# Patient Record
Sex: MALE | Race: WHITE | Employment: FULL TIME | URBAN - METROPOLITAN AREA
[De-identification: names, ages, dates, MRNs, and addresses within clinical notes are randomized per-mention and may not be internally consistent; named-entity substitution may affect disease eponyms.]

---

## 2017-04-25 NOTE — PROGRESS NOTES
"  SUBJECTIVE:                                                    Zackery Rogers is a 56 year old male who presents to clinic today for the following health issues:    HPI    Acute Illness   Acute illness concerns: Flu follow up  Onset: 4 weeks    Fever: no    Chills/Sweats: YES    Headache (location?): no    Sinus Pressure:YES- teeth pain    Conjunctivitis:  no    Ear Pain: no    Rhinorrhea: no    Congestion: YES    Sore Throat: no     Cough: YES-productive of clear sputum, with shortness of breath    Wheeze: no    Decreased Appetite: YES    Nausea: no    Vomiting: no    Diarrhea:  no    Dysuria/Freq.: no    Fatigue/Achiness: YES    Sick/Strep Exposure: YES- Pt had the flu     Therapies Tried and outcome: None    Joint pain  Phlegm in upper lungs  Cough  Sinus problems chronic  Muscle aches,   Working and so tired and aches in joints and muscles for 2 months  Not taking a statin  Night sweats  Swelling in legs  HDL always  [place]    Problem list and histories reviewed & adjusted, as indicated.  Additional history: as documented    Labs reviewed in EPIC    ROS:  Constitutional, HEENT, cardiovascular, pulmonary, gi and gu systems are negative, except as otherwise noted.    OBJECTIVE:                                                    /88 (BP Location: Left arm, Patient Position: Chair, Cuff Size: Adult Large)  Pulse 96  Temp 98.1  F (36.7  C) (Temporal)  Resp 16  Ht 5' 10.32\" (1.786 m)  Wt 229 lb (103.9 kg)  SpO2 97%  BMI 32.56 kg/m2  Body mass index is 32.56 kg/(m^2).  GENERAL: healthy, alert and no distress  EYES: Eyes grossly normal to inspection, PERRL and conjunctivae and sclerae normal  HENT: ear canals and TM's normal, nose and mouth without ulcers or lesions  NECK: no adenopathy, no asymmetry, masses, or scars and thyroid normal to palpation  RESP: lungs clear to auscultation - no rales, rhonchi or wheezes  CV: regular rate and rhythm, normal S1 S2, no S3 or S4, no murmur, click or rub, " no peripheral edema and peripheral pulses strong  MS: no gross musculoskeletal defects noted, no edema  SKIN: no suspicious lesions or rashes  NEURO: Normal strength and tone, mentation intact and speech normal  PSYCH: mentation appears normal, affect normal/bright    Diagnostic Test Results:  Results for orders placed or performed in visit on 04/26/17   Lipid panel reflex to direct LDL   Result Value Ref Range    Cholesterol 134 <200 mg/dL    Triglycerides 83 <150 mg/dL    HDL Cholesterol 29 (L) >39 mg/dL    LDL Cholesterol Calculated 88 <100 mg/dL    Non HDL Cholesterol 105 <130 mg/dL   Lyme Disease Cristina with reflex to WB Serum   Result Value Ref Range    Lyme Disease Antibodies Serum 0.06 0.00 - 0.89   CBC with platelets and differential   Result Value Ref Range    WBC 7.8 4.0 - 11.0 10e9/L    RBC Count 4.82 4.4 - 5.9 10e12/L    Hemoglobin 14.3 13.3 - 17.7 g/dL    Hematocrit 43.2 40.0 - 53.0 %    MCV 90 78 - 100 fl    MCH 29.7 26.5 - 33.0 pg    MCHC 33.1 31.5 - 36.5 g/dL    RDW 14.0 10.0 - 15.0 %    Platelet Count 159 150 - 450 10e9/L    Diff Method Automated Method     % Neutrophils 68.2 %    % Lymphocytes 16.3 %    % Monocytes 11.1 %    % Eosinophils 4.0 %    % Basophils 0.4 %    Absolute Neutrophil 5.3 1.6 - 8.3 10e9/L    Absolute Lymphocytes 1.3 0.8 - 5.3 10e9/L    Absolute Monocytes 0.9 0.0 - 1.3 10e9/L    Absolute Eosinophils 0.3 0.0 - 0.7 10e9/L    Absolute Basophils 0.0 0.0 - 0.2 10e9/L   Comprehensive metabolic panel (BMP + Alb, Alk Phos, ALT, AST, Total. Bili, TP)   Result Value Ref Range    Sodium 144 133 - 144 mmol/L    Potassium 4.1 3.4 - 5.3 mmol/L    Chloride 110 (H) 94 - 109 mmol/L    Carbon Dioxide 26 20 - 32 mmol/L    Anion Gap 8 3 - 14 mmol/L    Glucose 117 (H) 70 - 99 mg/dL    Urea Nitrogen 12 7 - 30 mg/dL    Creatinine 0.71 0.66 - 1.25 mg/dL    GFR Estimate >90  Non  GFR Calc   >60 mL/min/1.7m2    GFR Estimate If Black >90   GFR Calc   >60 mL/min/1.7m2     Calcium 8.9 8.5 - 10.1 mg/dL    Bilirubin Total 0.6 0.2 - 1.3 mg/dL    Albumin 3.2 (L) 3.4 - 5.0 g/dL    Protein Total 6.4 (L) 6.8 - 8.8 g/dL    Alkaline Phosphatase 114 40 - 150 U/L    ALT 22 0 - 70 U/L    AST 13 0 - 45 U/L   TSH with free T4 reflex   Result Value Ref Range    TSH 1.58 0.40 - 4.00 mU/L   CRP, inflammation   Result Value Ref Range    CRP Inflammation 68.2 (H) 0.0 - 8.0 mg/L        ASSESSMENT/PLAN:                                                      1. Acute bronchitis with symptoms > 10 days    - azithromycin (ZITHROMAX) 250 MG tablet; Two tablets first day, then one tablet daily for four days.  Dispense: 6 tablet; Refill: 0    3. Polymyalgia (H)/4. Polyarthralgia    - Lyme Disease Cristina with reflex to WB Serum  - CBC with platelets and differential  - Comprehensive metabolic panel (BMP + Alb, Alk Phos, ALT, AST, Total. Bili, TP)  - TSH with free T4 reflex  - CRP, inflammation    5. Fatigue, unspecified type    - Lyme Disease Cristina with reflex to WB Serum  - CBC with platelets and differential  - Comprehensive metabolic panel (BMP + Alb, Alk Phos, ALT, AST, Total. Bili, TP)  - TSH with free T4 reflex  - CRP, inflammation    6. Chest tightness  - XR Chest 2 Views; Future    7. Cough  - XR Chest 2 Views; Future    8. Encounter for screening for cardiovascular disorders  - Lipid panel reflex to direct LDL    1. Acute bronchitis with symptoms > 10 days  Cough has lasted over 4 weeks.  Treat for bacterial bronchitis with azithromycin.  Follow up in 10-14 days if symptoms persist or worsen.  - azithromycin (ZITHROMAX) 250 MG tablet; Two tablets first day, then one tablet daily for four days.  Dispense: 6 tablet; Refill: 0    2. DISH (diffuse idiopathic skeletal hyperostosis)  - ORTHO  REFERRAL    3. Polymyalgia (H)/4. Pain in joint, multiple sitesPatient having pain in multiple joints and muscles.    - Lyme Disease Cristina with reflex to WB Serum  - CBC with platelets and differential  - Comprehensive  metabolic panel (BMP + Alb, Alk Phos, ALT, AST, Total. Bili, TP)  - TSH with free T4 reflex  - CRP, inflammation  - RHEUMATOLOGY REFERRAL  - ORTHO  REFERRAL    5. Fatigue, unspecified type  Patient is more fatigued, is coming home after work and will fall asleep easily which is a significant change for him.  [place] out in the woods, r/o [place]ym[place].  Will also get blood work to check hemoglobin, kidney/liver function, thyroid and an inflammatory marker.  - Lyme Disease Cristina with reflex to WB Serum  - CBC with platelets and differential  - Comprehensive metabolic panel (BMP + Alb, Alk Phos, ALT, AST, Total. Bili, TP)  - TSH with free T4 reflex  - CRP, inflammation  - RHEUMATOLOGY REFERRAL    6. Chest tightness  - XR Chest 2 Views; Future    7. Cough  - XR Chest 2 Views; Future    8. Encounter for screening for cardiovascular disorders  - Lipid panel reflex to direct LDL    9. Elevated C-reactive protein (CRP)  - RHEUMATOLOGY REFERRAL    DINH [facility]

## 2017-04-26 ENCOUNTER — OFFICE VISIT (OUTPATIENT)
Dept: FAMILY MEDICINE | Facility: OTHER | Age: 57
End: 2017-04-26
Payer: COMMERCIAL

## 2017-04-26 ENCOUNTER — RADIANT APPOINTMENT (OUTPATIENT)
Dept: GENERAL RADIOLOGY | Facility: OTHER | Age: 57
End: 2017-04-26
Attending: STUDENT IN AN ORGANIZED HEALTH CARE EDUCATION/TRAINING PROGRAM
Payer: COMMERCIAL

## 2017-04-26 VITALS
HEIGHT: 70 IN | HEART RATE: 96 BPM | SYSTOLIC BLOOD PRESSURE: 120 MMHG | WEIGHT: 229 LBS | OXYGEN SATURATION: 97 % | RESPIRATION RATE: 16 BRPM | BODY MASS INDEX: 32.78 KG/M2 | TEMPERATURE: 98.1 F | DIASTOLIC BLOOD PRESSURE: 88 MMHG

## 2017-04-26 DIAGNOSIS — R79.82 ELEVATED C-REACTIVE PROTEIN (CRP): ICD-10-CM

## 2017-04-26 DIAGNOSIS — M35.3 POLYMYALGIA (H): ICD-10-CM

## 2017-04-26 DIAGNOSIS — R05.9 COUGH: ICD-10-CM

## 2017-04-26 DIAGNOSIS — R07.89 CHEST TIGHTNESS: ICD-10-CM

## 2017-04-26 DIAGNOSIS — J20.9 ACUTE BRONCHITIS WITH SYMPTOMS > 10 DAYS: Primary | ICD-10-CM

## 2017-04-26 DIAGNOSIS — M48.10 DISH (DIFFUSE IDIOPATHIC SKELETAL HYPEROSTOSIS): ICD-10-CM

## 2017-04-26 DIAGNOSIS — R53.83 FATIGUE, UNSPECIFIED TYPE: ICD-10-CM

## 2017-04-26 DIAGNOSIS — Z13.6 ENCOUNTER FOR SCREENING FOR CARDIOVASCULAR DISORDERS: ICD-10-CM

## 2017-04-26 DIAGNOSIS — M25.50 PAIN IN JOINT, MULTIPLE SITES: ICD-10-CM

## 2017-04-26 LAB
ALBUMIN SERPL-MCNC: 3.2 G/DL (ref 3.4–5)
ALP SERPL-CCNC: 114 U/L (ref 40–150)
ALT SERPL W P-5'-P-CCNC: 22 U/L (ref 0–70)
ANION GAP SERPL CALCULATED.3IONS-SCNC: 8 MMOL/L (ref 3–14)
AST SERPL W P-5'-P-CCNC: 13 U/L (ref 0–45)
BASOPHILS # BLD AUTO: 0 10E9/L (ref 0–0.2)
BASOPHILS NFR BLD AUTO: 0.4 %
BILIRUB SERPL-MCNC: 0.6 MG/DL (ref 0.2–1.3)
BUN SERPL-MCNC: 12 MG/DL (ref 7–30)
CALCIUM SERPL-MCNC: 8.9 MG/DL (ref 8.5–10.1)
CHLORIDE SERPL-SCNC: 110 MMOL/L (ref 94–109)
CHOLEST SERPL-MCNC: 134 MG/DL
CO2 SERPL-SCNC: 26 MMOL/L (ref 20–32)
CREAT SERPL-MCNC: 0.71 MG/DL (ref 0.66–1.25)
CRP SERPL-MCNC: 68.2 MG/L (ref 0–8)
DIFFERENTIAL METHOD BLD: NORMAL
EOSINOPHIL # BLD AUTO: 0.3 10E9/L (ref 0–0.7)
EOSINOPHIL NFR BLD AUTO: 4 %
ERYTHROCYTE [DISTWIDTH] IN BLOOD BY AUTOMATED COUNT: 14 % (ref 10–15)
GFR SERPL CREATININE-BSD FRML MDRD: ABNORMAL ML/MIN/1.7M2
GLUCOSE SERPL-MCNC: 117 MG/DL (ref 70–99)
HCT VFR BLD AUTO: 43.2 % (ref 40–53)
HDLC SERPL-MCNC: 29 MG/DL
HGB BLD-MCNC: 14.3 G/DL (ref 13.3–17.7)
LDLC SERPL CALC-MCNC: 88 MG/DL
LYMPHOCYTES # BLD AUTO: 1.3 10E9/L (ref 0.8–5.3)
LYMPHOCYTES NFR BLD AUTO: 16.3 %
MCH RBC QN AUTO: 29.7 PG (ref 26.5–33)
MCHC RBC AUTO-ENTMCNC: 33.1 G/DL (ref 31.5–36.5)
MCV RBC AUTO: 90 FL (ref 78–100)
MONOCYTES # BLD AUTO: 0.9 10E9/L (ref 0–1.3)
MONOCYTES NFR BLD AUTO: 11.1 %
NEUTROPHILS # BLD AUTO: 5.3 10E9/L (ref 1.6–8.3)
NEUTROPHILS NFR BLD AUTO: 68.2 %
NONHDLC SERPL-MCNC: 105 MG/DL
PLATELET # BLD AUTO: 159 10E9/L (ref 150–450)
POTASSIUM SERPL-SCNC: 4.1 MMOL/L (ref 3.4–5.3)
PROT SERPL-MCNC: 6.4 G/DL (ref 6.8–8.8)
RBC # BLD AUTO: 4.82 10E12/L (ref 4.4–5.9)
SODIUM SERPL-SCNC: 144 MMOL/L (ref 133–144)
TRIGL SERPL-MCNC: 83 MG/DL
TSH SERPL DL<=0.005 MIU/L-ACNC: 1.58 MU/L (ref 0.4–4)
WBC # BLD AUTO: 7.8 10E9/L (ref 4–11)

## 2017-04-26 PROCEDURE — 84443 ASSAY THYROID STIM HORMONE: CPT | Performed by: STUDENT IN AN ORGANIZED HEALTH CARE EDUCATION/TRAINING PROGRAM

## 2017-04-26 PROCEDURE — 71020 XR CHEST 2 VW: CPT

## 2017-04-26 PROCEDURE — 85025 COMPLETE CBC W/AUTO DIFF WBC: CPT | Performed by: STUDENT IN AN ORGANIZED HEALTH CARE EDUCATION/TRAINING PROGRAM

## 2017-04-26 PROCEDURE — 86618 LYME DISEASE ANTIBODY: CPT | Performed by: STUDENT IN AN ORGANIZED HEALTH CARE EDUCATION/TRAINING PROGRAM

## 2017-04-26 PROCEDURE — 99214 OFFICE O/P EST MOD 30 MIN: CPT | Performed by: STUDENT IN AN ORGANIZED HEALTH CARE EDUCATION/TRAINING PROGRAM

## 2017-04-26 PROCEDURE — 86140 C-REACTIVE PROTEIN: CPT | Performed by: STUDENT IN AN ORGANIZED HEALTH CARE EDUCATION/TRAINING PROGRAM

## 2017-04-26 PROCEDURE — 80053 COMPREHEN METABOLIC PANEL: CPT | Performed by: STUDENT IN AN ORGANIZED HEALTH CARE EDUCATION/TRAINING PROGRAM

## 2017-04-26 PROCEDURE — 80061 LIPID PANEL: CPT | Performed by: STUDENT IN AN ORGANIZED HEALTH CARE EDUCATION/TRAINING PROGRAM

## 2017-04-26 PROCEDURE — 36415 COLL VENOUS BLD VENIPUNCTURE: CPT | Performed by: STUDENT IN AN ORGANIZED HEALTH CARE EDUCATION/TRAINING PROGRAM

## 2017-04-26 RX ORDER — LISINOPRIL 40 MG/1
40 TABLET ORAL
COMMUNITY
Start: 2017-04-03 | End: 2018-09-04 | Stop reason: ALTCHOICE

## 2017-04-26 RX ORDER — MINOCYCLINE HYDROCHLORIDE 100 MG/1
100 TABLET ORAL
Status: ON HOLD | COMMUNITY
Start: 2016-10-07 | End: 2017-06-05

## 2017-04-26 ASSESSMENT — PAIN SCALES - GENERAL: PAINLEVEL: NO PAIN (0)

## 2017-04-26 NOTE — MR AVS SNAPSHOT
"              After Visit Summary   4/26/2017    Zackery Rogers    MRN: [id number]           Patient Information     Date Of Birth          1960        Visit Information        Provider Department      4/26/2017 8:40 AM Kalina Poe APRN CNP [facility]        Today's Diagnoses     Fatigue, unspecified type    -  1    Screen for colon cancer        Need for hepatitis C screening test        Polymyalgia (H)        Encounter for screening for cardiovascular disorders        Chest tightness        Cough           Follow-ups after your visit        Who to contact     If you have questions or need follow up information about today's clinic visit or your schedule please contact [facility] directly at [phone].  Normal or non-critical lab and imaging results will be communicated to you by [employer], letter or phone within 4 business days after the clinic has received the results. If you do not hear from us within 7 days, please contact the clinic through MyChart or phone. If you have a critical or abnormal lab result, we will notify you by phone as soon as possible.  Submit refill requests through [employer] or call your pharmacy and they will forward the refill request to us. Please allow 3 business days for your refill to be completed.          Additional Information About Your Visit        MyChart Information     [employer] lets you send messages to your doctor, view your test results, renew your prescriptions, schedule appointments and more. To sign up, go to [email or web address] . Click on \"Log in\" on the left side of the screen, which will take you to the Welcome page. Then click on \"Sign up Now\" on the right side of the page.     You will be asked to enter the access code listed below, as well as some personal information. Please follow the directions to create your username and password.     Your access code is: [email or web address]  Expires: 7/25/2017  9:44 AM     Your " "access code will  in 90 days. If you need help or a new code, please call your [place] clinic or [phone].        Care EveryWhere ID     This is your Care EveryWhere ID. This could be used by other organizations to access your [place] medical records  [id number]        Your Vitals Were     Pulse Temperature Respirations Height Pulse Oximetry BMI (Body Mass Index)    96 98.1  F (36.7  C) (Temporal) 16 5' 10.32\" (1.786 m) 97% 32.56 kg/m2       Blood Pressure from Last 3 Encounters:   17 120/88   16 134/78    Weight from Last 3 Encounters:   17 229 lb (103.9 kg)   16 236 lb (107 kg)              We Performed the Following     CBC with platelets and differential     Comprehensive metabolic panel (BMP + Alb, Alk Phos, ALT, AST, Total. Bili, TP)     CRP, inflammation     Lipid panel reflex to direct LDL     Lyme Disease Cristina with reflex to WB Serum     TSH with free T4 reflex        Primary Care Provider Office Phone # Fax #    Kalina Carmencita Poe, [facility] [phone]       [facility] [address]  [facility] [address]        Thank you!     Thank you for choosing [facility]  for your care. Our goal is always to provide you with excellent care. Hearing back from our patients is one way we can continue to improve our services. Please take a few minutes to complete the written survey that you may receive in the mail after your visit with us. Thank you!             Your Updated Medication List - Protect others around you: Learn how to safely use, store and throw away your medicines at www.disposemymeds.org.          This list is accurate as of: 17  9:44 AM.  Always use your most recent med list.                   Brand Name Dispense Instructions for use    lisinopril 40 MG tablet    PRINIVIL/ZESTRIL     Take 40 mg by mouth       minocycline 100 MG tablet    DYNACIN     Take 100 mg by mouth         "

## 2017-04-26 NOTE — NURSING NOTE
"Chief Complaint   Patient presents with     Flu     Follow up     Panel Management     MyChart, Colon, hep C, lipid       Initial /88 (BP Location: Left arm, Patient Position: Chair, Cuff Size: Adult Large)  Pulse 96  Temp 98.1  F (36.7  C) (Temporal)  Resp 16  Ht 5' 10.32\" (1.786 m)  Wt 229 lb (103.9 kg)  SpO2 97%  BMI 32.56 kg/m2 Estimated body mass index is 32.56 kg/(m^2) as calculated from the following:    Height as of this encounter: 5' 10.32\" (1.786 m).    Weight as of this encounter: 229 lb (103.9 kg).  Medication Reconciliation: complete   Nelida Hamilton CMA      "

## 2017-04-27 ENCOUNTER — TELEPHONE (OUTPATIENT)
Dept: FAMILY MEDICINE | Facility: OTHER | Age: 57
End: 2017-04-27

## 2017-04-27 LAB — B BURGDOR IGG+IGM SER QL: 0.06 (ref 0–0.89)

## 2017-04-29 RX ORDER — AZITHROMYCIN 250 MG/1
TABLET, FILM COATED ORAL
Qty: 6 TABLET | Refills: 0 | Status: SHIPPED | OUTPATIENT
Start: 2017-04-29 | End: 2017-05-10

## 2017-05-09 ENCOUNTER — TELEPHONE (OUTPATIENT)
Dept: FAMILY MEDICINE | Facility: OTHER | Age: 57
End: 2017-05-09

## 2017-05-09 NOTE — TELEPHONE ENCOUNTER
Reason for call:  Symptom  Reason for call:  Patient reporting a symptom    Symptom or request: achy body, cold, hot sweats, upset stomach, no traveling    Duration (how long have symptoms been present): 2 months    Have you been treated for this before? Yes    Additional comments: should he be seen again for this or what are his options    Phone Number patient can be reached at:  Home number on file [phone] (home)    Best Time:  any    Can we leave a detailed message on this number:  YES    Call taken on 5/9/2017 at 8:03 AM by Saranya Lowe

## 2017-05-09 NOTE — TELEPHONE ENCOUNTER
"Per LOV notes, patient should follow up in clinic if symptoms persist or worsen.  He states he continues to have symptoms, but they are \"different every day\".   Nothing is new and his sinuses are actually clear.   He continues to cough.   Recheck was scheduled for tomorrow morning - he declines an appointment today as he is already at work.     Kennedi Tavarez, RN, BSN    "

## 2017-05-09 NOTE — PROGRESS NOTES
"  SUBJECTIVE:                                                    Zackery Rogers is a 56 year old male who presents to clinic today for the following health issues:      HPI    Acute Illness   Acute illness concerns: Cough, body aches  Onset: 2 months    Fever: no    Chills/Sweats: YES, comes home from work and has to curl up in a blanket until he starts to sweat.  At night is consistently having night sweats.    Headache (location?): no    Sinus Pressure:no    Conjunctivitis:  no    Ear Pain: no    Rhinorrhea: no    Congestion: YES    Sore Throat: no     Cough: YES-productive of clear sputum, with shortness of breath    Wheeze: no    Decreased Appetite: no    Nausea: no    Vomiting: no    Diarrhea:  no    Dysuria/Freq.: no    Fatigue/Achiness: YES    Sick/Strep Exposure: no     Therapies Tried and outcome: azithromycin - does not seem to help, pt states another medication that is in the same family as minocycline, possibly doxycyline, does not remember.      Concern - Ankle swelling     Onset: \"Half way through since cough\"    Description:   Ankles swelling, has tingling feeling and feels pressure. Feels good in the morning, but builds up throughout the day    Intensity: None    Progression of Symptoms:  worsening    Accompanying Signs & Symptoms:  Tingling, pressure, tingling has kind of gone away, more of a pressure type feeling, worse as the day goes on  Neck aches       Previous history of similar problem:   None    Precipitating factors:   Worsened by: N/A    Alleviating factors:  Improved by: N/A       Therapies Tried and outcome: 25    Feeling like needs to wrap up after gets home from work and then will get really hot    No bowel or bladder changes  Shortness of breath - tiny bit with coughing spells but never at rest  No skin lesions  No swelling of lymph nodes - not that he has noticed  Loss of appetite at the beginning but getting better      Problem list and histories reviewed & adjusted, as " "indicated.  Additional history: as documented    Labs reviewed in EPIC    ROS:  Constitutional, HEENT, cardiovascular, pulmonary, gi and gu systems are negative, except as otherwise noted.    OBJECTIVE:                                                    /78 (BP Location: Left arm, Patient Position: Chair, Cuff Size: Adult Large)  Pulse 93  Temp 98.2  F (36.8  C) (Temporal)  Resp 16  Ht 5' 10.32\" (1.786 m)  Wt 233 lb 9.6 oz (106 kg)  SpO2 95%  BMI 33.22 kg/m2  Body mass index is 33.22 kg/(m^2).  GENERAL: healthy, alert and no distress  EYES: Eyes grossly normal to inspection, PERRL and conjunctivae and sclerae normal  HENT: ear canals and TM's normal, nose and mouth without ulcers or lesions  NECK: no adenopathy, no asymmetry, masses, or scars and thyroid normal to palpation  RESP: lungs clear to auscultation - no rales, rhonchi or wheezes  CV: regular rate and rhythm, normal S1 S2, no S3 or S4, no murmur, click or rub, +1 pitting edema in BLEs and peripheral pulses strong  ABDOMEN: palpated a mass in LUQ that is tender to palpation, approximately 10 x 7 cm, otherwise soft, nontender, no hepatomegaly, no masses and bowel sounds normal  MS: no gross musculoskeletal defects noted, no edema  SKIN: no suspicious lesions or rashes  NEURO: Normal strength and tone, mentation intact and speech normal  PSYCH: mentation appears normal, affect normal/bright  LYMPH: possible right inguinal adenopathy, thickening/mass palpated, no cervical, supraclavicular, axillary, adenopathy    Diagnostic Test Results:  Results for orders placed or performed in visit on 05/10/17 (from the past 24 hour(s))   ESR: Erythrocyte sedimentation rate   Result Value Ref Range    Sed Rate 25 (H) 0 - 20 mm/h        ASSESSMENT/PLAN:                                                      1. Swelling of both ankles  - ESR: Erythrocyte sedimentation rate  - Rheumatoid factor    2. Abdominal mass, left upper quadrant  - CT Abdomen Pelvis w " Contrast; Future    3. Night sweats  - *UA reflex to Microscopic and Culture ([place] [facility] (except [place] and [place])  - CT Abdomen Pelvis w Contrast; Future    4. Chills  - *UA reflex to Microscopic and Culture ([place] [facility] (except [place] and [place])  - CT Abdomen Pelvis w Contrast; Future    5. Lymphadenopathy, inguinal  Right groin mass consistent with lymphadenopathy. CT pending.  - CT Abdomen Pelvis w Contrast; Future    6. Need for hepatitis C screening test  - Hepatitis C Screen Reflex to HCV RNA Quant and Genotype    Greater than 50% of 30 minute visit were spent on counseling or coordination of care regarding lower extremity swelling, abdominal mass, night sweats, chills, lymphadenopathy.     [facility]

## 2017-05-10 ENCOUNTER — OFFICE VISIT (OUTPATIENT)
Dept: FAMILY MEDICINE | Facility: OTHER | Age: 57
End: 2017-05-10
Payer: COMMERCIAL

## 2017-05-10 VITALS
RESPIRATION RATE: 16 BRPM | HEART RATE: 93 BPM | SYSTOLIC BLOOD PRESSURE: 132 MMHG | TEMPERATURE: 98.2 F | BODY MASS INDEX: 33.44 KG/M2 | OXYGEN SATURATION: 95 % | HEIGHT: 70 IN | WEIGHT: 233.6 LBS | DIASTOLIC BLOOD PRESSURE: 78 MMHG

## 2017-05-10 DIAGNOSIS — M25.472 SWELLING OF BOTH ANKLES: Primary | ICD-10-CM

## 2017-05-10 DIAGNOSIS — Z11.59 NEED FOR HEPATITIS C SCREENING TEST: ICD-10-CM

## 2017-05-10 DIAGNOSIS — R59.0 LYMPHADENOPATHY, INGUINAL: ICD-10-CM

## 2017-05-10 DIAGNOSIS — R68.83 CHILLS: ICD-10-CM

## 2017-05-10 DIAGNOSIS — M25.471 SWELLING OF BOTH ANKLES: Primary | ICD-10-CM

## 2017-05-10 DIAGNOSIS — R61 NIGHT SWEATS: ICD-10-CM

## 2017-05-10 DIAGNOSIS — R19.02 ABDOMINAL MASS, LEFT UPPER QUADRANT: ICD-10-CM

## 2017-05-10 LAB
ALBUMIN UR-MCNC: NEGATIVE MG/DL
APPEARANCE UR: CLEAR
BILIRUB UR QL STRIP: NEGATIVE
COLOR UR AUTO: YELLOW
ERYTHROCYTE [SEDIMENTATION RATE] IN BLOOD BY WESTERGREN METHOD: 25 MM/H (ref 0–20)
GLUCOSE UR STRIP-MCNC: NEGATIVE MG/DL
HGB UR QL STRIP: NEGATIVE
KETONES UR STRIP-MCNC: NEGATIVE MG/DL
LEUKOCYTE ESTERASE UR QL STRIP: NEGATIVE
NITRATE UR QL: NEGATIVE
PH UR STRIP: 5.5 PH (ref 5–7)
SP GR UR STRIP: 1.02 (ref 1–1.03)
URN SPEC COLLECT METH UR: NORMAL
UROBILINOGEN UR STRIP-ACNC: 1 EU/DL (ref 0.2–1)

## 2017-05-10 PROCEDURE — 36415 COLL VENOUS BLD VENIPUNCTURE: CPT | Performed by: STUDENT IN AN ORGANIZED HEALTH CARE EDUCATION/TRAINING PROGRAM

## 2017-05-10 PROCEDURE — 85652 RBC SED RATE AUTOMATED: CPT | Performed by: STUDENT IN AN ORGANIZED HEALTH CARE EDUCATION/TRAINING PROGRAM

## 2017-05-10 PROCEDURE — 99214 OFFICE O/P EST MOD 30 MIN: CPT | Performed by: STUDENT IN AN ORGANIZED HEALTH CARE EDUCATION/TRAINING PROGRAM

## 2017-05-10 PROCEDURE — 86431 RHEUMATOID FACTOR QUANT: CPT | Performed by: STUDENT IN AN ORGANIZED HEALTH CARE EDUCATION/TRAINING PROGRAM

## 2017-05-10 PROCEDURE — 81003 URINALYSIS AUTO W/O SCOPE: CPT | Performed by: STUDENT IN AN ORGANIZED HEALTH CARE EDUCATION/TRAINING PROGRAM

## 2017-05-10 PROCEDURE — 86803 HEPATITIS C AB TEST: CPT | Performed by: STUDENT IN AN ORGANIZED HEALTH CARE EDUCATION/TRAINING PROGRAM

## 2017-05-10 ASSESSMENT — PAIN SCALES - GENERAL: PAINLEVEL: NO PAIN (0)

## 2017-05-10 NOTE — MR AVS SNAPSHOT
After Visit Summary   5/10/2017    Zackery Rogers    MRN: [id number]           Patient Information     Date Of Birth          1960        Visit Information        Provider Department      5/10/2017 8:20 AM Kalina Poe APRN [facility]        Today's Diagnoses     Abdominal mass, left upper quadrant    -  1    Screen for colon cancer        Need for hepatitis C screening test        Night sweats        Chills        Lymphadenopathy, inguinal        Swelling of both ankles          Care Instructions    CT scan abdomen and pelvis on 5/11/17 at 8 am.    Blood work and urine test.    Kalina Poe NP-C  [phone]        Follow-ups after your visit        Your next 10 appointments already scheduled     May 11, 2017  8:00 AM CDT   CT ABDOMEN PELVIS W CONTRAST with PHCT1   [facility] CT Scan ([facility])    [address] [place] [id number] [phone]           Please bring any scans or X-rays taken at other hospitals, if similar tests were done. Also bring a list of your medicines, including vitamins, minerals and over-the-counter drugs. It is safest to leave personal items at home.  Be sure to tell your doctor:   If you have any allergies.   If there s any chance you are pregnant.   If you are breastfeeding.   If you have any special needs.  You may have contrast for this exam. To prepare:   Do not eat or drink for 2 hours before your exam. If you need to take medicine, you may take it with small sips of water. (We may ask you to take liquid medicine as well.)   The day before your exam, drink extra fluids at least six 8-ounce glasses (unless your doctor tells you to restrict your fluids).  Patients over 70 or patients with diabetes or kidney problems:   If you haven t had a blood test (creatinine test) within the last 30 days, go to your clinic or Diagnostic Imaging Department for this test.  If you have diabetes:   If  your kidney function is normal, continue taking your metformin (Avandamet, Glucophage, Glucovance, Metaglip) on the day of your exam.   If your kidney function is abnormal, wait 48 hours before restarting this medicine.  You will have oral contrast for this exam:   You will drink the contrast at home. Get this from your clinic or Diagnostic Imaging Department. Please follow the directions given.  Please wear loose clothing, such as a sweat suit or jogging clothes. Avoid snaps, zippers and other metal. We may ask you to undress and put on a hospital gown.  If you have any questions, please call the Imaging Department where you will have your exam.              Future tests that were ordered for you today     Open Future Orders        Priority Expected Expires Ordered    CT Abdomen Pelvis w Contrast Routine  5/10/2018 5/10/2017            Who to contact     If you have questions or need follow up information about today's clinic visit or your schedule please contact [facility] directly at [phone].  Normal or non-critical lab and imaging results will be communicated to you by [employer], letter or phone within 4 business days after the clinic has received the results. If you do not hear from us within 7 days, please contact the clinic through [employer] or phone. If you have a critical or abnormal lab result, we will notify you by phone as soon as possible.  Submit refill requests through [employer] or call your pharmacy and they will forward the refill request to us. Please allow 3 business days for your refill to be completed.          Additional Information About Your Visit        [employer] Information     [employer] gives you secure access to your electronic health record. If you see a primary care provider, you can also send messages to your care team and make appointments. If you have questions, please call your primary care clinic.  If you do not have a primary care provider, please call [phone] and  "they will assist you.        Care EveryWhere ID     This is your Care EveryWhere ID. This could be used by other organizations to access your [facility] medical records  [id number]        Your Vitals Were     Pulse Temperature Respirations Height Pulse Oximetry BMI (Body Mass Index)    93 98.2  F (36.8  C) (Temporal) 16 5' 10.32\" (1.786 m) 95% 33.22 kg/m2       Blood Pressure from Last 3 Encounters:   05/10/17 132/78   04/26/17 120/88   01/13/16 134/78    Weight from Last 3 Encounters:   05/10/17 233 lb 9.6 oz (106 kg)   04/26/17 229 lb (103.9 kg)   01/13/16 236 lb (107 kg)              We Performed the Following     *UA reflex to Microscopic and Culture ([place] [facility] (except [place] and [place])     ESR: Erythrocyte sedimentation rate     Hepatitis C Screen Reflex to HCV RNA Quant and Genotype     Rheumatoid factor        Primary Care Provider Office Phone # Fax #    DINH Sar[facility] [phone]       [facility] [address]  [facility] [address]        Thank you!     Thank you for choosing [facility]  for your care. Our goal is always to provide you with excellent care. Hearing back from our patients is one way we can continue to improve our services. Please take a few minutes to complete the written survey that you may receive in the mail after your visit with us. Thank you!             Your Updated Medication List - Protect others around you: Learn how to safely use, store and throw away your medicines at www.disposemymeds.org.          This list is accurate as of: 5/10/17  9:07 AM.  Always use your most recent med list.                   Brand Name Dispense Instructions for use    lisinopril 40 MG tablet    PRINIVIL/ZESTRIL     Take 40 mg by mouth       minocycline 100 MG tablet    DYNACIN     Take 100 mg by mouth         "

## 2017-05-10 NOTE — PATIENT INSTRUCTIONS
CT scan abdomen and pelvis on 5/11/17 at 8 am.    Blood work and urine test.    Kalina Poe, NP-C  [phone]

## 2017-05-10 NOTE — NURSING NOTE
"Chief Complaint   Patient presents with     Cough     Panel Management     Hep C, colon       Initial /78 (BP Location: Left arm, Patient Position: Chair, Cuff Size: Adult Large)  Pulse 93  Temp 98.2  F (36.8  C) (Temporal)  Resp 16  Ht 5' 10.32\" (1.786 m)  Wt 233 lb 9.6 oz (106 kg)  SpO2 95%  BMI 33.22 kg/m2 Estimated body mass index is 33.22 kg/(m^2) as calculated from the following:    Height as of this encounter: 5' 10.32\" (1.786 m).    Weight as of this encounter: 233 lb 9.6 oz (106 kg).  Medication Reconciliation: complete   Nelida Hamilton CMA      "

## 2017-05-11 ENCOUNTER — HOSPITAL ENCOUNTER (OUTPATIENT)
Dept: CT IMAGING | Facility: CLINIC | Age: 57
Discharge: HOME | End: 2017-05-11
Attending: STUDENT IN AN ORGANIZED HEALTH CARE EDUCATION/TRAINING PROGRAM | Admitting: STUDENT IN AN ORGANIZED HEALTH CARE EDUCATION/TRAINING PROGRAM
Payer: COMMERCIAL

## 2017-05-11 DIAGNOSIS — R19.02 ABDOMINAL MASS, LEFT UPPER QUADRANT: ICD-10-CM

## 2017-05-11 DIAGNOSIS — R68.83 CHILLS: ICD-10-CM

## 2017-05-11 DIAGNOSIS — R59.0 LYMPHADENOPATHY, INGUINAL: ICD-10-CM

## 2017-05-11 DIAGNOSIS — R61 NIGHT SWEATS: ICD-10-CM

## 2017-05-11 LAB
HCV AB SERPL QL IA: NORMAL
RHEUMATOID FACT SER NEPH-ACNC: <20 IU/ML (ref 0–20)

## 2017-05-11 PROCEDURE — 25000125 ZZHC RX 250: Performed by: STUDENT IN AN ORGANIZED HEALTH CARE EDUCATION/TRAINING PROGRAM

## 2017-05-11 PROCEDURE — 74177 CT ABD & PELVIS W/CONTRAST: CPT

## 2017-05-11 PROCEDURE — 25500064 ZZH RX 255 OP 636: Performed by: STUDENT IN AN ORGANIZED HEALTH CARE EDUCATION/TRAINING PROGRAM

## 2017-05-11 RX ORDER — IOPAMIDOL 755 MG/ML
500 INJECTION, SOLUTION INTRAVASCULAR ONCE
Status: COMPLETED | OUTPATIENT
Start: 2017-05-11 | End: 2017-05-11

## 2017-05-11 RX ADMIN — IOPAMIDOL 100 ML: 755 INJECTION, SOLUTION INTRAVENOUS at 08:08

## 2017-05-11 RX ADMIN — SODIUM CHLORIDE 60 ML: 9 INJECTION, SOLUTION INTRAVENOUS at 08:08

## 2017-05-12 ENCOUNTER — TELEPHONE (OUTPATIENT)
Dept: INTERVENTIONAL RADIOLOGY/VASCULAR | Facility: CLINIC | Age: 57
End: 2017-05-12

## 2017-05-12 ENCOUNTER — TELEPHONE (OUTPATIENT)
Dept: GASTROENTEROLOGY | Facility: CLINIC | Age: 57
End: 2017-05-12

## 2017-05-12 NOTE — TELEPHONE ENCOUNTER
Contacted by interventional radiology re pt that they were asked to see to consider biopsy of palpable pancreatic lesion.  Review of CT shows large cystic lesion in region of pancreatic body highly suggestive of walled-off necrosis rather than neoplasm. 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 Review of [employer] shows CT in 3/2105 showing pancreatitis and no lesion in this region.    Called and discussed with referring clinician, Kalina Poe from [facility]. Stated pt stable and having vague symptoms (sweats etc). Was incidental finding on abdominal exam.    Please initiate referral to next available EUS physician for outpt clinic visit.    Will be critically important to get records uploaded in [employer] (currently cannot load without active visit) AND any outside abdominal CT or MRI from the last 5 years loaded in the system prior to the clinic visit.    Unclear if pt aware of possible clinic visit, however referring clinic contacted.    NARENDRA Ruelas MD  Associate Professor of Medicine  Division of Gastroenterology, Hepatology and Nutrition  [facility]

## 2017-05-12 NOTE — TELEPHONE ENCOUNTER
Gustavo was referred to Interventional Radiology for possible biopsy of pancreatic cyst.  The patient has a h/o pancreatitis in 2015.  I reviewed the films with Dr. Lewis from IR and he said the patient should be referred to GI.  I spoke with Dr. Ruelas and he would get the patient in to see someone in GI.  I left the pt a voice mail that his appt for consult was canceled, because there would be no biopsy.  He should also expect a phone call from GI dept to set up an appt   Dr. Ruelas was going to touch base with the patient's PCP.    Leigh Mckeon MS< APRN, CNS

## 2017-05-15 ENCOUNTER — CARE COORDINATION (OUTPATIENT)
Dept: GASTROENTEROLOGY | Facility: CLINIC | Age: 57
End: 2017-05-15

## 2017-05-15 ENCOUNTER — TELEPHONE (OUTPATIENT)
Dept: FAMILY MEDICINE | Facility: OTHER | Age: 57
End: 2017-05-15

## 2017-05-15 ENCOUNTER — TELEPHONE (OUTPATIENT)
Dept: GASTROENTEROLOGY | Facility: CLINIC | Age: 57
End: 2017-05-15

## 2017-05-15 DIAGNOSIS — Z87.19 HISTORY OF PANCREATITIS: ICD-10-CM

## 2017-05-15 DIAGNOSIS — R59.0 RETROPERITONEAL LYMPHADENOPATHY: ICD-10-CM

## 2017-05-15 DIAGNOSIS — K86.89 PANCREATIC MASS: Primary | ICD-10-CM

## 2017-05-15 DIAGNOSIS — K85.91 NECROTIZING PANCREATITIS: Primary | ICD-10-CM

## 2017-05-15 NOTE — TELEPHONE ENCOUNTER
"Spoke to pt, pt is upset that he drove to the [facility] and appointment was cancelled.     Message from IR on 5/12 \"Gustavo was referred to Interventional Radiology for possible biopsy of pancreatic cyst. The patient has a h/o pancreatitis in 2015. I reviewed the films with Dr. Lewis from IR and he said the patient should be referred to GI. I spoke with Dr. Ruelas and he would get the patient in to see someone in GI.  I left the pt a voice mail that his appt for consult was canceled, because there would be no biopsy. He should also expect a phone call from GI dept to set up an appt   Dr. Ruelas was going to touch base with the patient's PCP.\"     Pt would like to talk with EM; states he never received a voicemail or a phone call. Routing to EM for review. Please advise.   "

## 2017-05-15 NOTE — TELEPHONE ENCOUNTER
I have a page out to Dr. Ruelas to discuss further reasoning on GI consult.  Talked to Dr. Shaw Ruelas and he is getting the 2015 CT scan images from [facility] to review.  He feels the mass may be walled off necrotic tissue.  He stated his staff will be calling patient in the next 48 hours to set up an appointment with him.  Called and discussed this with patient.      Called and talked to patient. Received call from Dr. Ruelas with GI and he stated he would call patient to explain that appointment would be cancelled and this did not happen.      Kalina Poe, MAIK-C

## 2017-05-15 NOTE — PROGRESS NOTES
Message received to schedule Zackery in clinic Thursday May 18th and then block procedure time for Friday May 19th.     Called Zackery, he is scheduled in clinic with Dr. Navarro for May 18th at 8:30am.   He is aware we will schedule him for PAC and possible procedure Friday.     Orders placed and sent to scheduling.     Annmarie LAO RN Coordinator  Dr. Oscar, Dr. Hinton & Dr. Lim  Pancreas~Biliary  [phone] #[phone]

## 2017-05-15 NOTE — TELEPHONE ENCOUNTER
Reason for Call:  Other appointment    Detailed comments: Gustavo and wife got the [facility] for his appointment this morning about his mass on pancrease and when they got there they told him his appointment was canceled.  They are very worried about this and don't want to waste any more time.  Please call ASAP    Phone Number Patient can be reached at: Cell number on file:    Telephone Information:   Mobile [phone]       Best Time: ASAP    Can we leave a detailed message on this number? YES    Call taken on 5/15/2017 at 7:47 AM by Rose Santana

## 2017-05-17 ENCOUNTER — TELEPHONE (OUTPATIENT)
Dept: GASTROENTEROLOGY | Facility: CLINIC | Age: 57
End: 2017-05-17

## 2017-05-18 ENCOUNTER — OFFICE VISIT (OUTPATIENT)
Dept: GASTROENTEROLOGY | Facility: CLINIC | Age: 57
End: 2017-05-18

## 2017-05-18 ENCOUNTER — ALLIED HEALTH/NURSE VISIT (OUTPATIENT)
Dept: SURGERY | Facility: CLINIC | Age: 57
End: 2017-05-18

## 2017-05-18 ENCOUNTER — ANESTHESIA EVENT (OUTPATIENT)
Dept: SURGERY | Facility: CLINIC | Age: 57
End: 2017-05-18
Payer: COMMERCIAL

## 2017-05-18 ENCOUNTER — OFFICE VISIT (OUTPATIENT)
Dept: SURGERY | Facility: CLINIC | Age: 57
End: 2017-05-18

## 2017-05-18 VITALS
OXYGEN SATURATION: 96 % | RESPIRATION RATE: 18 BRPM | HEART RATE: 86 BPM | WEIGHT: 225.12 LBS | SYSTOLIC BLOOD PRESSURE: 123 MMHG | BODY MASS INDEX: 32.23 KG/M2 | DIASTOLIC BLOOD PRESSURE: 88 MMHG | TEMPERATURE: 98.8 F | HEIGHT: 70 IN

## 2017-05-18 VITALS
WEIGHT: 225.8 LBS | OXYGEN SATURATION: 96 % | HEIGHT: 70 IN | SYSTOLIC BLOOD PRESSURE: 123 MMHG | BODY MASS INDEX: 32.33 KG/M2 | DIASTOLIC BLOOD PRESSURE: 88 MMHG | HEART RATE: 86 BPM | TEMPERATURE: 98.2 F

## 2017-05-18 DIAGNOSIS — Z87.19 HISTORY OF PANCREATITIS: ICD-10-CM

## 2017-05-18 DIAGNOSIS — K85.90 PANCREATITIS: ICD-10-CM

## 2017-05-18 DIAGNOSIS — K85.01 IDIOPATHIC ACUTE PANCREATITIS WITH UNINFECTED NECROSIS: Primary | ICD-10-CM

## 2017-05-18 DIAGNOSIS — Z01.818 PREOP EXAMINATION: Primary | ICD-10-CM

## 2017-05-18 DIAGNOSIS — K86.89 PANCREATIC MASS: ICD-10-CM

## 2017-05-18 DIAGNOSIS — R59.0 RETROPERITONEAL LYMPHADENOPATHY: ICD-10-CM

## 2017-05-18 LAB
ALBUMIN SERPL-MCNC: 3.1 G/DL (ref 3.4–5)
ALP SERPL-CCNC: 94 U/L (ref 40–150)
ALT SERPL W P-5'-P-CCNC: 23 U/L (ref 0–70)
AMYLASE SERPL-CCNC: 23 U/L (ref 30–110)
ANION GAP SERPL CALCULATED.3IONS-SCNC: 8 MMOL/L (ref 3–14)
AST SERPL W P-5'-P-CCNC: 17 U/L (ref 0–45)
BASOPHILS # BLD AUTO: 0 10E9/L (ref 0–0.2)
BASOPHILS NFR BLD AUTO: 0.4 %
BILIRUB SERPL-MCNC: 0.7 MG/DL (ref 0.2–1.3)
BUN SERPL-MCNC: 16 MG/DL (ref 7–30)
CALCIUM SERPL-MCNC: 9 MG/DL (ref 8.5–10.1)
CHLORIDE SERPL-SCNC: 106 MMOL/L (ref 94–109)
CO2 SERPL-SCNC: 28 MMOL/L (ref 20–32)
CREAT SERPL-MCNC: 1.34 MG/DL (ref 0.66–1.25)
DIFFERENTIAL METHOD BLD: NORMAL
EOSINOPHIL # BLD AUTO: 0.2 10E9/L (ref 0–0.7)
EOSINOPHIL NFR BLD AUTO: 2 %
ERYTHROCYTE [DISTWIDTH] IN BLOOD BY AUTOMATED COUNT: 13.9 % (ref 10–15)
GFR SERPL CREATININE-BSD FRML MDRD: 55 ML/MIN/1.7M2
GLUCOSE SERPL-MCNC: 107 MG/DL (ref 70–99)
HCT VFR BLD AUTO: 42.6 % (ref 40–53)
HGB BLD-MCNC: 13.6 G/DL (ref 13.3–17.7)
IMM GRANULOCYTES # BLD: 0 10E9/L (ref 0–0.4)
IMM GRANULOCYTES NFR BLD: 0.4 %
INR PPP: 1.21 (ref 0.86–1.14)
LIPASE SERPL-CCNC: 111 U/L (ref 73–393)
LYMPHOCYTES # BLD AUTO: 1.2 10E9/L (ref 0.8–5.3)
LYMPHOCYTES NFR BLD AUTO: 11.2 %
MCH RBC QN AUTO: 28.8 PG (ref 26.5–33)
MCHC RBC AUTO-ENTMCNC: 31.9 G/DL (ref 31.5–36.5)
MCV RBC AUTO: 90 FL (ref 78–100)
MONOCYTES # BLD AUTO: 1.1 10E9/L (ref 0–1.3)
MONOCYTES NFR BLD AUTO: 10.5 %
NEUTROPHILS # BLD AUTO: 7.9 10E9/L (ref 1.6–8.3)
NEUTROPHILS NFR BLD AUTO: 75.5 %
NRBC # BLD AUTO: 0 10*3/UL
NRBC BLD AUTO-RTO: 0 /100
PLATELET # BLD AUTO: 300 10E9/L (ref 150–450)
POTASSIUM SERPL-SCNC: 4.5 MMOL/L (ref 3.4–5.3)
PROT SERPL-MCNC: 6.9 G/DL (ref 6.8–8.8)
RBC # BLD AUTO: 4.73 10E12/L (ref 4.4–5.9)
SODIUM SERPL-SCNC: 142 MMOL/L (ref 133–144)
WBC # BLD AUTO: 10.4 10E9/L (ref 4–11)

## 2017-05-18 ASSESSMENT — LIFESTYLE VARIABLES: TOBACCO_USE: 0

## 2017-05-18 ASSESSMENT — PAIN SCALES - GENERAL: PAINLEVEL: MILD PAIN (2)

## 2017-05-18 NOTE — H&P
Pre-Operative H & P     CC:  Preoperative exam to assess for increased cardiopulmonary risk while undergoing surgery and anesthesia.    Date of Encounter: 5/18/2017  Primary Care Physician:  Kalina Poe  Zackery Rogers is a 56 year old male who presents for pre-operative H & P in preparation for an EUS and ERCP with Dr. Navarro on 5/19/17 at [facility].       Zackery Rogers is a 56 year old male hypertension, DISH, polymyalgia, rosacea, and obesity that has recently been diagnosed with a pancreatic mass.  The patient went to his primary care provider for 2 separate visits (4/26/17 and 5/10/2017) with multiple generalized complaints including joint pain, fatigue, BLE edema, abdominal discomfort, poor appetite, etc.  Multiple evaluative tests were done for his complaints and a pancreatic mass was found with an abdominal CT scan.  He was subsequently referred here to the GI service for evaluation.  Labs and imaging were reviewed by the service and it is thought that the mass may be necrotic.  He met with Dr. Navarro today for consultation and the above listed procedure has been recommended for further evaluation.      History is obtained from the patient and his wife.     Past Medical History  Past Medical History:   Diagnosis Date     DISH (diffuse idiopathic skeletal hyperostosis)      History of acute pancreatitis 2015     Hypertension      Obesity      Pancreatic mass      Rosacea        Past Surgical History  Past Surgical History:   Procedure Laterality Date     NO HISTORY OF SURGERY         Hx of Blood transfusions/reactions: none    Hx of abnormal bleeding or anti-platelet use: none    Steroid use in the last year: none    Personal or FH with difficulty with Anesthesia:  none    Prior to Admission Medications  Current Outpatient Prescriptions   Medication Sig Dispense Refill     lisinopril (PRINIVIL/ZESTRIL) 40 MG tablet Take 40  mg by mouth       minocycline (DYNACIN) 100 MG tablet Take 100 mg by mouth         Allergies  No Known Allergies    Social History  Social History     Social History     Marital status:      Spouse name: Kelsey Rogers     Number of children: 2     Years of education: N/A     Occupational History     auto body repair      Social History Main Topics     Smoking status: Never Smoker     Smokeless tobacco: Not on file     Alcohol use Yes      Comment: OCC     Drug use: No     Sexual activity: Yes     Partners: Female     Other Topics Concern     Not on file     Social History Narrative       Family History  Family History   Problem Relation Age of Onset     Alzheimer Disease Mother      CEREBROVASCULAR DISEASE Father      Family History Negative Brother      Family History Negative Sister      Family History Negative Brother      Family History Negative Brother                  ROS/MED HX    The complete review of systems is negative other than noted in the HPI or here.   ENT/Pulmonary:     (+)KAREL risk factors hypertension, other ENT- recurrent sinus congestion, , . .   (-) tobacco use   Neurologic:  - neg neurologic ROS     Cardiovascular: Comment: Mild BLE edema since abdominal symptoms started a couple months ago.  Edema gradually worsens throughout day. primary care provider aware and evaluated.  (+) hypertension-range: unknown, ---. : . . . :. . No previous cardiac testing      (-) VALDEZ   METS/Exercise Tolerance:  4 - Raking leaves, gardening   Hematologic:  - neg hematologic  ROS       Musculoskeletal:   (+) , , other musculoskeletal- DISH, multiple joint pain      GI/Hepatic:     (+) Other GI/Hepatic pancreatic mass      Renal/Genitourinary:  - ROS Renal section negative       Endo:     (+) Obesity, .      Psychiatric:  - neg psychiatric ROS       Infectious Disease:  - neg infectious disease ROS       Malignancy:      - no malignancy   Other: Comment: rosacea   (+) no H/O Chronic Pain,    "                  Temp: 98.8  F (37.1  C) Temp src: Oral BP: 123/88 Pulse: 86   Resp: 18 SpO2: 96 %         225 lbs 1.92 oz  5' 10\"   Body mass index is 32.3 kg/(m^2).       Physical Exam  Constitutional: Awake, alert, cooperative, no apparent distress, and appears stated age. obese  Eyes: Pupils equal, round and reactive to light, extra ocular muscles intact, sclera clear, conjunctiva normal.  HENT: Normocephalic, oral pharynx with moist mucus membranes.  Dentition - multiple chipped teeth. No goiter appreciated.  Small right anterior palpable neck mass, appears lipoma-like.  Respiratory: Clear to auscultation bilaterally, no crackles or wheezing.  Cardiovascular: Regular rate and rhythm, normal S1 and S2, and no murmur noted.  Carotids +2, no bruits. Trace non-pitting BLE edema. Palpable pulses to radial  DP and PT arteries.   GI: Normal bowel sounds, soft, non-distended, non-tender, no masses palpated, no hepatosplenomegaly.    Lymph/Hematologic: No cervical lymphadenopathy and no supraclavicular lymphadenopathy.  Genitourinary:  deferred  Skin: Warm and dry.  No rashes at anticipated surgical site.   Musculoskeletal: Full ROM of neck. There is no redness, warmth, or swelling of the exposed joints. Gross motor strength is normal.    Neurologic: Awake, alert, oriented to name, place and time. Cranial nerves II-XII are grossly intact. Gait is normal.   Neuropsychiatric: Calm, cooperative. Normal affect.     Labs: (personally reviewed)    Component      Latest Ref Rng & Units 5/18/2017   WBC      4.0 - 11.0 10e9/L 10.4   RBC Count      4.4 - 5.9 10e12/L 4.73   Hemoglobin      13.3 - 17.7 g/dL 13.6   Hematocrit      40.0 - 53.0 % 42.6   MCV      78 - 100 fl 90   MCH      26.5 - 33.0 pg 28.8   MCHC      31.5 - 36.5 g/dL 31.9   RDW      10.0 - 15.0 % 13.9   Platelet Count      150 - 450 10e9/L 300   Diff Method       Automated Method   % Neutrophils      % 75.5   % Lymphocytes      % 11.2   % Monocytes      % 10.5   % " Eosinophils      % 2.0   % Basophils      % 0.4   % Immature Granulocytes      % 0.4   Nucleated RBCs      0 /100 0   Absolute Neutrophil      1.6 - 8.3 10e9/L 7.9   Absolute Lymphocytes      0.8 - 5.3 10e9/L 1.2   Absolute Monocytes      0.0 - 1.3 10e9/L 1.1   Absolute Eosinophils      0.0 - 0.7 10e9/L 0.2   Absolute Basophils      0.0 - 0.2 10e9/L 0.0   Abs Immature Granulocytes      0 - 0.4 10e9/L 0.0   Absolute Nucleated RBC       0.0   Sodium      133 - 144 mmol/L 142   Potassium      3.4 - 5.3 mmol/L 4.5   Chloride      94 - 109 mmol/L 106   Carbon Dioxide      20 - 32 mmol/L 28   Anion Gap      3 - 14 mmol/L 8   Glucose      70 - 99 mg/dL 107 (H)   Urea Nitrogen      7 - 30 mg/dL 16   Creatinine      0.66 - 1.25 mg/dL 1.34 (H)   GFR Estimate      >60 mL/min/1.7m2 55 (L)   GFR Estimate If Black      >60 mL/min/1.7m2 66   Calcium      8.5 - 10.1 mg/dL 9.0   Bilirubin Total      0.2 - 1.3 mg/dL 0.7   Albumin      3.4 - 5.0 g/dL 3.1 (L)   Protein Total      6.8 - 8.8 g/dL 6.9   Alkaline Phosphatase      40 - 150 U/L 94   ALT      0 - 70 U/L 23   AST      0 - 45 U/L 17   Lipase      73 - 393 U/L 111   Amylase      30 - 110 U/L 23 (L)   INR      0.86 - 1.14 1.21 (H)     EKG: 3/30/2015  NSR      Outside records reviewed from: Care Everywhere      ASSESSMENT and PLAN  Zackery Rogers is a 56 year old male scheduled for an EUS and ERCP on 5/19/2017 by Dr. Navarro in evaluation of a pancreatic mass that is thought to possibly be necrotic.  PAC referral for risk assessment and optimization for anesthesia with comorbid conditions of: hypertension, DISH, polymyalgia, rosacea, and obesity.      Pre-operative considerations:  1.  Cardiac:  Functional status- METS 4.  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  He doesn't note any exertional dyspnea with activity.  Low risk surgery with 0.4% risk of major adverse cardiac event.  No further  cardiac evaluation needed per 2014 ACC/AHA guidelines for non-cardiac surgery.  2.  Pulm:  Airway feasible.  KAREL risk: intermediate.  He is obese with a BMI >30.    3.  GI:  Risk of PONV score = 1.  If > 2, anti-emetic intervention recommended.  4. Musculoskeletal:  Patient has DISH and multiple joint pain; consider cautious positioning.      VTE risk:  0.5%    Patient is optimized and is acceptable candidate for the proposed procedure.  No further diagnostic evaluation is needed.     Patient discussed with Dr. Hale.               Ness Davenport DNP, RN, APRN  Preoperative Assessment Center  [facility]  [facility]  Phone: [phone]  Fax: [phone]

## 2017-05-18 NOTE — ANESTHESIA PREPROCEDURE EVALUATION
Anesthesia Evaluation     . Pt has not had prior anesthetic            ROS/MED HX    ENT/Pulmonary:     (+)KAREL risk factors hypertension, other ENT- recurrent sinus congestion, , . .   (-) tobacco use   Neurologic:  - neg neurologic ROS     Cardiovascular: Comment: Mild BLE edema since abdominal symptoms started a couple months ago.  Edema gradually worsens throughout day. primary care provider aware and evaluated.    (+) hypertension-range: unknown, ---. : . . . :. . Previous cardiac testing date:results:date: results:ECG reviewed date:3/30/2015 results:NSR date: results:         (-) VALDEZ   METS/Exercise Tolerance:  4 - Raking leaves, gardening   Hematologic:  - neg hematologic  ROS       Musculoskeletal:   (+) , , other musculoskeletal- DISH, multiple joint pain      GI/Hepatic:     (+) Other GI/Hepatic pancreatic mass      Renal/Genitourinary:  - ROS Renal section negative       Endo:     (+) Obesity, .      Psychiatric:  - neg psychiatric ROS       Infectious Disease:  - neg infectious disease ROS       Malignancy:      - no malignancy   Other: Comment: rosacea   (+) no H/O Chronic Pain,                   Physical Exam  Normal systems: cardiovascular and pulmonary    Airway   Mallampati: II  TM distance: >3 FB  Neck ROM: full    Dental   (+) chipped  Comment: Multiple chipped teeth    Cardiovascular   Rhythm and rate: regular and normal      Pulmonary    breath sounds clear to auscultation               PAC Discussion and Assessment    ASA Classification: 2  Case is suitable for: [place]  Anesthetic techniques and relevant risks discussed: GA  Invasive monitoring and risk discussed: No  Types:   Possibility and Risk of blood transfusion discussed: No  NPO instructions given:   Additional anesthetic preparation and risks discussed:   Needs early admission to pre-op area:   Other:     PAC Resident/NP Anesthesia Assessment:  Zackery Rogers is a 56 year old male scheduled for an EUS and ERCP on 5/19/2017 by   Melanie in evaluation of a pancreatic mass that is thought to possibly be necrotic.  PAC referral for risk assessment and optimization for anesthesia with comorbid conditions of: hypertension, DISH, polymyalgia, rosacea, and obesity.      Pre-operative considerations:  1.  Cardiac:  Functional status- METS 4.  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  He doesn't note any exertional dyspnea with activity.  Low risk surgery with 0.4% risk of major adverse cardiac event.  No further cardiac evaluation needed per 2014 ACC/AHA guidelines for non-cardiac surgery.  2.  Pulm:  Airway feasible.  KAREL risk: intermediate.  He is obese with a BMI >30.    3.  GI:  Risk of PONV score = 1.  If > 2, anti-emetic intervention recommended.  4. Musculoskeletal:  Patient has DISH and multiple joint pain; consider cautious positioning.      VTE risk:  0.5%    Patient is optimized and is acceptable candidate for the proposed procedure.  No further diagnostic evaluation is needed.     Patient discussed with Dr. Hale.     For further details of assessment, testing, and physical exam please see H and P completed on same date.          Ness Davenport DNP, RN, APRN      Reviewed and Signed by PAC Mid-Level Provider/Resident  Mid-Level Provider/Resident: Ness Davenport DNP, RN, APRN  Date: 5/18/2017  Time: 1428    Attending Anesthesiologist Anesthesia Assessment:  56 year old for EUS, ERCP. Patient/case discussed with AILYN. No need to see patient. Patient is appropriate for the planned procedure without further work-up or medical management.      Reviewed and Signed by PAC Anesthesiologist  Anesthesiologist: alonzo  Date: 5/18/2017  Time:   Pass/Fail: Pass  Disposition:     PAC Pharmacist Assessment:        Pharmacist:   Date:   Time:      Anesthesia Plan      History & Physical Review  History and physical reviewed and following examination; no interval  change.    ASA Status:  2 .    NPO Status:  > 8 hours    Plan for General and ETT with Intravenous induction. Maintenance will be Balanced.    PONV prophylaxis:  Ondansetron (or other 5HT-3) and Other (See comment)       Postoperative Care  Postoperative pain management:  IV analgesics and Multi-modal analgesia.      Consents  Anesthetic plan, risks, benefits and alternatives discussed with:  Patient..                          .

## 2017-05-18 NOTE — PROGRESS NOTES
REQUESTING PHYSICIAN:  DINH Mendez, CNP.      REASON FOR CONSULTATION:  Necrotizing pancreatitis.      CHIEF COMPLAINT:  Loss of weight and failure to thrive, night sweats.      HISTORY OF PRESENT ILLNESS:  This is a very pleasant 56-year-old gentleman with past medical history significant for hypertension, colitis and a previous episode of idiopathic acute pancreatitis in 2015, who is being evaluated for a new peripancreatic collection.  The patient reports that he was admitted for nearly 2 weeks at [facility] from 03/30/2015.  We do not have the records or the details of the events during hospitalization, but the patient states he was not in ICU and did not have any end-organ damage.  He recovered after 2 weeks and has been doing okay until the past 2 months.  For the past 2 months he has been having abdominal discomfort and fullness, which is worse after eating, and he has been having intermittent night sweats, most recently yesterday, with no fevers or chills.  He reports a 10-pound weight and a progressively poor appetite.  He denies nausea, vomiting, denies diarrhea or constipation.  Denies dysphagia and odynophagia.  Denies passage of dark-colored urine or jai-colored stools.  He attributed this to the flu and, therefore, he went to his primary care doctor for this flu-like symptoms and he underwent a CT scan on 03/11, which showed a predominantly cystic mass within the pancreatic body concerning for neoplasm, along with hypodense lesions in the liver, which most likely represents hemangioma.  He was sent for sampling; however, when we reviewed the CT scan, given his past history, this appeared more like walled off necrotic collection, which could have been sequelae of his prior pancreatitis attack.  There seemed to be a little bit of blood within collection.  The patient reports that he had been taking ibuprofen injudiciously for the past 2 months for this abdominal discomfort.      PAST  MEDICAL HISTORY:  Hypertension, rosacea, colitis, fracture.      PAST SURGICAL HISTORY:  None.      MEDICATIONS:  See Epic.      ALLERGIES:  No known drug allergies.      SOCIAL HISTORY:  No history of smoking.  History of drinking in the past, quit 2-5 years back.  Never a heavy drinker according to wife, but has been arrested for driving under the influence as a teenager.  No history of IV drug abuse.      FAMILY HISTORY:  No history of pancreatitis in the family or pancreatic cancer in the family.      REVIEW OF SYSTEMS:  A complete 10-point review of systems was performed and noted to be negative except as above.      PHYSICAL EXAMINATION:   GENERAL:  He is not in acute distress.   HEAD AND NECK:  No pallor, no icterus.   ABDOMEN:  Soft, obese.   CENTRAL NERVOUS SYSTEM:  Alert, awake, oriented to time, place and person.   EXTREMITIES:  No pedal edema.        IMAGING:  CT scan dated 05/11/2017 shows heterogeneously hypoechoic oval mass measuring 13.1 x 9.7 x 9.3 cm within the pancreatic body.      ASSESSMENT AND PLAN:  This is a 56-year-old gentleman with history of hypertension, rosacea, colitis, episode of idiopathic pancreatitis leading to 2 weeks of hospitalization and now with new walled off peripancreatic necrotic collection, which is symptomatic and may be infected.  Our recommendations are as follows:    1.  The patient has been scheduled to undergo an endoscopic transluminal drainage.  We went over this procedure in detail.  We discussed the need for general anesthesia and this first procedure to be performed under fluoroscopy.  We also discussed that this is performed under fluoroscopy  using an endoscopic ultrasound guidance with Doppler to exclude intervening vessels.  We will perform cystogastrostomy and place lumen apposing Axios stent between the stomach and the necrotic collection. We went over the complications including the risks of anesthesia, bleeding, perforation and pancreatitis, infection.   The patient was told that he would get broad spectrum IV antibiotic during the procedure and also for 7 days to protect him from infection. We went over post procedure events including possible stent occlusion common in 1-2 weeks resulting in infection-fevers, chills and night sweats which will prompt an immediate endoscopic necrosectomy.  We went over the natural course of the disease including need for 2 or 3 more sessions of endoscopic necrosectomy.  Patient understands the risks and is willing to undergo this procedure.     2.  He has a preop appointment at the [facility] later this afternoon for clearance for scheduled OR procedure under general anesthesia.   3.  We will check comprehensive metabolic panel, CBC and INR.   4.  We will schedule a colonoscopy once all this is resolved as requested by the patient.      A total of 45 minutes were spent with more than 50% of the time in counseling and formulating a management plan.

## 2017-05-18 NOTE — MR AVS SNAPSHOT
After Visit Summary   2017    Zackery Rogers    MRN: [id number]           Patient Information     Date Of Birth          1960        Visit Information        Provider Department      2017 1:30 PM Rn, [facility] Preoperative Assessment Center        Care Instructions    Preparing for Your Surgery      Name:  Zackery Rogers   MRN:  [id number]   :  1960   Today's Date:  2017     Arriving for surgery:  Surgery date:  17  Surgery time:  11:30 am  Arrival time:  9:30 am  Please come to:       [facility] [address]  [address] [place]  [address]    -   parking is available in front of the hospital from 5:15 am to 8:00 pm    -  Stop at the Information Desk in the lobby    -   Inform the information person that you are here for surgery. An escort to 3c will be provided. If you would not like an escort, please proceed to 3C on the 3rd floor. [phone]     What can I eat or drink?  -  You may have solid food or milk products until 8 hours prior to your surgery. Stop food, milk and cream at midnight tonight, 17.  -  You may have water, apple juice or 7up/Sprite until 2 hours prior to your surgery.  Stop water, apple juice and 7 up at 9:30 am  17.  Which medicines can I take?         Do not take any aspirin or advil/ibuprofen today or tomorrow.  Do not take lisinopril day of surgery    -  Please take these medications the day of surgery:  17       minocycline      How do I prepare myself?  -  Take two showers: one the night before surgery; and one the morning of surgery.         Use Scrubcare or Hibiclens to wash from neck down.  You may use your own shampoo and conditioner. No other hair products.   -  Do NOT use lotion, powder, deodorant, or antiperspirant the day of your surgery.  -  Do NOT wear any makeup, fingernail polish or jewelry.  --Do not bring your own medications to the hospital, except for  inhalers and eye drops.  -  Bring your ID and insurance card.    Questions or Concerns:  If you have questions or concerns, please call the  Preoperative Assessment Center, Monday-Friday 7AM-7PM:  [phone]            AFTER YOUR SURGERY  Breathing exercises   Breathing exercises help you recover faster. Take deep breaths and let the air out slowly. This will:     Help you wake up after surgery.    Help prevent complications like pneumonia.  Preventing complications will help you go home sooner.   We may give you a breathing device (incentive spirometer) to encourage you to breathe deeply.   Nausea and vomiting   You may feel sick to your stomach after surgery; if so, let your nurse know.    Pain control:  After surgery, you may have pain. Our goal is to help you manage your pain. Pain medicine will help you feel comfortable enough to do activities that will help you heal.  These activities may include breathing exercises, walking and physical therapy.   To help your health care team treat your pain we will ask: 1) If you have pain  2) where it is located 3) describe your pain in your words  Methods of pain control include medications given by mouth, vein or by nerve block for some surgeries.  We may give you a pain control pump that will:  1) Deliver the medicine through a tube placed in your vein  2) Control the amount of medicine you receive  3) Allow you to push a button to deliver a dose of pain medicine  Sequential Compression Device (SCD) or Pneumo Boots:  You may need to wear SCD S on your legs or feet. These are wraps connected to a machine that pumps in air and releases it. The repeated pumping helps prevent blood clots from forming.                   Follow-ups after your visit        Your next 10 appointments already scheduled     May 18, 2017  3:00 PM CDT   (Arrive by 2:45 PM)   PAC Anesthesia Consult with  Pac Anesthesiologist   [facility] Preoperative Assessment Center ([facility] Clinics and Surgery  [facility])    [address]  4th Floor  [facility] [id number] [phone]            May 19, 2017   Procedure with Guru Diana Navarro MD   [facility], [place], Same Day Surgery (--)    [address]  [place] [address] [phone]            Aug 24, 2017  8:00 AM CDT   (Arrive by 7:45 AM)   Return Visit with Guru Diana Navarro MD   [facility] Pancreas and Biliary ([facility])    [address]  4th Floor  [facility] [id number] [phone]              Who to contact     Please call your clinic at [phone] to:    Ask questions about your health    Make or cancel appointments    Discuss your medicines    Learn about your test results    Speak to your doctor   If you have compliments or concerns about an experience at your clinic, or if you wish to file a complaint, please contact [facility] Patient Relations at [phone] or email us at [email or web address]         Additional Information About Your Visit        Gioia SystemsharNetSpend Information     V-Keyt gives you secure access to your electronic health record. If you see a primary care provider, you can also send messages to your care team and make appointments. If you have questions, please call your primary care clinic.  If you do not have a primary care provider, please call [phone] and they will assist you.      Duable Chinese is an electronic gateway that provides easy, online access to your medical records. With Duable Chinese, you can request a clinic appointment, read your test results, renew a prescription or communicate with your care team.     To access your existing account, please contact your [facility] or call [phone] for assistance.        Care EveryWhere ID     This is your Care EveryWhere ID. This could be used by other organizations to access your [place] medical records  [id number]         Blood Pressure  from Last 3 Encounters:   05/18/17 123/88   05/18/17 123/88   05/10/17 132/78    Weight from Last 3 Encounters:   05/18/17 102.1 kg (225 lb 1.9 oz)   05/18/17 102.4 kg (225 lb 12.8 oz)   05/10/17 106 kg (233 lb 9.6 oz)              Today, you had the following     No orders found for display       Primary Care Provider Office Phone # Fax #    Kalina DINH Mo [phone]       [facility] [address]  [facility] [address]        Thank you!     Thank you for choosing [facility] PREOPERATIVE ASSESSMENT CENTER  for your care. Our goal is always to provide you with excellent care. Hearing back from our patients is one way we can continue to improve our services. Please take a few minutes to complete the written survey that you may receive in the mail after your visit with us. Thank you!             Your Updated Medication List - Protect others around you: Learn how to safely use, store and throw away your medicines at www.disposemymeds.org.          This list is accurate as of: 5/18/17  2:15 PM.  Always use your most recent med list.                   Brand Name Dispense Instructions for use    lisinopril 40 MG tablet    PRINIVIL/ZESTRIL     Take 40 mg by mouth       minocycline 100 MG tablet    DYNACIN     Take 100 mg by mouth

## 2017-05-18 NOTE — LETTER
5/18/2017       RE: Zackery Rogers  [address] [facility] [address]     Dear Colleague,    Thank you for referring your patient, Zackery Rogers, to the [facility] PANCREAS AND BILIARY at [facility]. Please see a copy of my visit note below.    REQUESTING PHYSICIAN:  DINH Mendez, CNP.      REASON FOR CONSULTATION:  Necrotizing pancreatitis.      CHIEF COMPLAINT:  Loss of weight and failure to thrive, night sweats.      HISTORY OF PRESENT ILLNESS:  This is a very pleasant 56-year-old gentleman with past medical history significant for hypertension, colitis and a previous episode of idiopathic acute pancreatitis in 2015, who is being evaluated for a new peripancreatic collection.  The patient reports that he was admitted for nearly 2 weeks at [facility] from 03/30/2015.  We do not have the records or the details of the events during hospitalization, but the patient states he was not in ICU and did not have any end-organ damage.  He recovered after 2 weeks and has been doing okay until the past 2 months.  For the past 2 months he has been having abdominal discomfort and fullness, which is worse after eating, and he has been having intermittent night sweats, most recently yesterday, with no fevers or chills.  He reports a 10-pound weight and a progressively poor appetite.  He denies nausea, vomiting, denies diarrhea or constipation.  Denies dysphagia and odynophagia.  Denies passage of dark-colored urine or jai-colored stools.  He attributed this to the flu and, therefore, he went to his primary care doctor for this flu-like symptoms and he underwent a CT scan on 03/11, which showed a predominantly cystic mass within the pancreatic body concerning for neoplasm, along with hypodense lesions in the liver, which most likely represents hemangioma.  He was sent for sampling; however, when we reviewed the CT scan, given his past history, this appeared more like  walled off necrotic collection, which could have been sequelae of his prior pancreatitis attack.  There seemed to be a little bit of blood within collection.  The patient reports that he had been taking ibuprofen injudiciously for the past 2 months for this abdominal discomfort.      PAST MEDICAL HISTORY:  Hypertension, rosacea, colitis, fracture.      PAST SURGICAL HISTORY:  None.      MEDICATIONS:  See Epic.      ALLERGIES:  No known drug allergies.      SOCIAL HISTORY:  No history of smoking.  History of drinking in the past, quit 2-5 years back.  Never a heavy drinker according to wife, but has been arrested for driving under the influence as a teenager.  No history of IV drug abuse.      FAMILY HISTORY:  No history of pancreatitis in the family or pancreatic cancer in the family.      REVIEW OF SYSTEMS:  A complete 10-point review of systems was performed and noted to be negative except as above.      PHYSICAL EXAMINATION:   GENERAL:  He is not in acute distress.   HEAD AND NECK:  No pallor, no icterus.   ABDOMEN:  Soft, obese.   CENTRAL NERVOUS SYSTEM:  Alert, awake, oriented to time, place and person.   EXTREMITIES:  No pedal edema.        IMAGING:  CT scan dated 05/11/2017 shows heterogeneously hypoechoic oval mass measuring 13.1 x 9.7 x 9.3 cm within the pancreatic body.      ASSESSMENT AND PLAN:  This is a 56-year-old gentleman with history of hypertension, rosacea, colitis, episode of idiopathic pancreatitis leading to 2 weeks of hospitalization and now with new walled off peripancreatic necrotic collection, which is symptomatic and may be infected.  Our recommendations are as follows:    1.  The patient has been scheduled to undergo an endoscopic transluminal drainage.  We went over this procedure in detail.  We discussed the need for general anesthesia and this first procedure to be performed under fluoroscopy.  We also discussed that this is performed under fluoroscopy  using an endoscopic ultrasound  guidance with Doppler to exclude intervening vessels.  We will perform cystogastrostomy and place lumen apposing Axios stent between the stomach and the necrotic collection. We went over the complications including the risks of anesthesia, bleeding, perforation and pancreatitis, infection.  The patient was told that he would get broad spectrum IV antibiotic during the procedure and also for 7 days to protect him from infection. We went over post procedure events including possible stent occlusion common in 1-2 weeks resulting in infection-fevers, chills and night sweats which will prompt an immediate endoscopic necrosectomy.  We went over the natural course of the disease including need for 2 or 3 more sessions of endoscopic necrosectomy.  Patient understands the risks and is willing to undergo this procedure.     2.  He has a preop appointment at the PAC Clinic later this afternoon for clearance for scheduled OR procedure under general anesthesia.   3.  We will check comprehensive metabolic panel, CBC and INR.   4.  We will schedule a colonoscopy once all this is resolved as requested by the patient.      A total of 45 minutes were spent with more than 50% of the time in counseling and formulating a management plan.       Guru Melanie MD

## 2017-05-18 NOTE — PATIENT INSTRUCTIONS
1. Labs- first floor    2. PAC appointment for pre-op today    3. Procedure tomorrow with Dr. Navarro      Follow up: 3 months with Dr. Navarro    Please call with any questions or concerns regarding your clinic visit today.    It is a pleasure being involved in your health care.    Contacts post-consultation depending on your need:    Schedule Clinic Appointments       [phone]       M-F 7:30 - 5 pm    Annmarie LAO RN Coordinator              [phone] #4   M-F 8:00 - 4:30pm    Procedure Scheduling                   [phone]    My Chart is available 24 hours a day and is a secure way to access your records and communicate with your care team.  I strongly recommend signing up if you haven't already done so, if you are comfortable with computers.  If you would like to inquire about this or are having problems with My Chart access, you may call [phone] or go online at [email or web address].  Please allow at least 24 hours for a response and extra time on weekends and Holidays.

## 2017-05-18 NOTE — MR AVS SNAPSHOT
After Visit Summary   5/18/2017    Zackery Rogers    MRN: [id number]           Patient Information     Date Of Birth          1960        Visit Information        Provider Department      5/18/2017 8:30 AM Guru Diana Navarro MD [facility] Pancreas and Biliary        Today's Diagnoses     Pancreatitis    -  1      Care Instructions    1. Labs- first floor    2. PAC appointment for pre-op today    3. Procedure tomorrow with Dr. Navarro      Follow up: 3 months with Dr. Navarro    Please call with any questions or concerns regarding your clinic visit today.    It is a pleasure being involved in your health care.    Contacts post-consultation depending on your need:    Schedule Clinic Appointments       [phone]       M-F 7:30 - 5 pm    Annmarie LAO RN Coordinator              [phone] #4   M-F 8:00 - 4:30pm    Procedure Scheduling                   [phone]    My Chart is available 24 hours a day and is a secure way to access your records and communicate with your care team.  I strongly recommend signing up if you haven't already done so, if you are comfortable with computers.  If you would like to inquire about this or are having problems with My Chart access, you may call [phone] or go online at [email or web address].  Please allow at least 24 hours for a response and extra time on weekends and Holidays.            Follow-ups after your visit        Your next 10 appointments already scheduled     May 18, 2017  1:30 PM CDT   (Arrive by 1:15 PM)   PAC RN ASSESSMENT with Kristen Pac Rn   [facility] Preoperative Assessment [place] ([facility])    [address] [place] [address]  4th Floor  [facility] [address]   [phone]            May 18, 2017  2:00 PM CDT   (Arrive by 1:45 PM)   PAC EVALUATION with Kristen Pac Fritz 8   [facility] Preoperative Assessment [place] ([facility])    [address] [place] [address]  [facility] [id number] [phone]            May 18, 2017  3:00 PM CDT   (Arrive by 2:45 PM)   PAC Anesthesia Consult with  Pac Anesthesiologist   [facility] Preoperative Assessment Center ([facility])    [address]  4th Floor  [facility] [id number] [phone]            May 19, 2017   Procedure with Guru Diana Navarro MD   [facility], [place], Same Day Surgery (--)    [address] [facility] [address] [phone]              Future tests that were ordered for you today     Open Future Orders        Priority Expected Expires Ordered    Amylase Routine 5/18/2017 5/18/2018 5/18/2017    CBC with platelets differential Routine 5/18/2017 5/18/2018 5/18/2017    INR Routine 5/18/2017 5/18/2018 5/18/2017    Comprehensive metabolic panel Routine 5/18/2017 5/18/2018 5/18/2017    Lipase Routine 5/18/2017 5/18/2018 5/18/2017            Who to contact     Please call your clinic at [phone] to:    Ask questions about your health    Make or cancel appointments    Discuss your medicines    Learn about your test results    Speak to your doctor   If you have compliments or concerns about an experience at your clinic, or if you wish to file a complaint, please contact [facility] Patient Relations at [phone] or email us at [email or web address]         Additional Information About Your Visit        RetroSense The[employer]Bloompop Information     EntreMed gives you secure access to your electronic health record. If you see a primary care provider, you can also send messages to your care team and make appointments. If you have questions, please call your primary care clinic.  If you do not have a primary care provider, please call [phone] and they will assist you.      EntreMed is an electronic gateway that provides easy, online access to your medical records. With EntreMed, you can request a clinic appointment, read your test results,  "renew a prescription or communicate with your care team.     To access your existing account, please contact your [facility] or call [phone] for assistance.        Care EveryWhere ID     This is your Care EveryWhere ID. This could be used by other organizations to access your [place] medical records  [id number]        Your Vitals Were     Pulse Temperature Height Pulse Oximetry BMI (Body Mass Index)       86 98.2  F (36.8  C) 1.778 m (5' 10\") 96% 32.4 kg/m2        Blood Pressure from Last 3 Encounters:   05/18/17 123/88   05/10/17 132/78   04/26/17 120/88    Weight from Last 3 Encounters:   05/18/17 102.4 kg (225 lb 12.8 oz)   05/10/17 106 kg (233 lb 9.6 oz)   04/26/17 103.9 kg (229 lb)               Primary Care Provider Office Phone # Fax #    Kalina DINH Mo [facility] [phone]       [facility] [address]  [facility] [address]        Thank you!     Thank you for choosing [facility] PANCREAS AND BILIARY  for your care. Our goal is always to provide you with excellent care. Hearing back from our patients is one way we can continue to improve our services. Please take a few minutes to complete the written survey that you may receive in the mail after your visit with us. Thank you!             Your Updated Medication List - Protect others around you: Learn how to safely use, store and throw away your medicines at www.disposemymeds.org.          This list is accurate as of: 5/18/17  9:16 AM.  Always use your most recent med list.                   Brand Name Dispense Instructions for use    lisinopril 40 MG tablet    PRINIVIL/ZESTRIL     Take 40 mg by mouth       minocycline 100 MG tablet    DYNACIN     Take 100 mg by mouth         "

## 2017-05-18 NOTE — PATIENT INSTRUCTIONS
Preparing for Your Surgery      Name:  Zackery Rogers   MRN:  [id number]   :  1960   Today's Date:  2017     Arriving for surgery:  Surgery date:  17  Surgery time:  11:30 am  Arrival time:  9:30 am  Please come to:       [facility] [address]  [address] [place]  [address]    -   parking is available in front of the hospital from 5:15 am to 8:00 pm    -  Stop at the Information Desk in the lobby    -   Inform the information person that you are here for surgery. An escort to 3c will be provided. If you would not like an escort, please proceed to 3C on the 3rd floor. [phone]     What can I eat or drink?  -  You may have solid food or milk products until 8 hours prior to your surgery. Stop food, milk and cream at midnight tonight, 17.  -  You may have water, apple juice or 7up/Sprite until 6 hours prior to your surgery.  Stop water, apple juice and 7 up at 5:30 am  17.  Which medicines can I take?         Do not take any aspirin or advil/ibuprofen today or tomorrow.  Do not take lisinopril day of surgery    -  Please take these medications the day of surgery:  17       minocycline      How do I prepare myself?  -  Take two showers: one the night before surgery; and one the morning of surgery.         Use Scrubcare or Hibiclens to wash from neck down.  You may use your own shampoo and conditioner. No other hair products.   -  Do NOT use lotion, powder, deodorant, or antiperspirant the day of your surgery.  -  Do NOT wear any makeup, fingernail polish or jewelry.  --Do not bring your own medications to the hospital, except for inhalers and eye drops.  -  Bring your ID and insurance card.    Questions or Concerns:  If you have questions or concerns, please call the  Preoperative Assessment Center, Monday-Friday 7AM-7PM:  [phone]            AFTER YOUR SURGERY  Breathing exercises   Breathing exercises help you recover faster.  Take deep breaths and let the air out slowly. This will:     Help you wake up after surgery.    Help prevent complications like pneumonia.  Preventing complications will help you go home sooner.   We may give you a breathing device (incentive spirometer) to encourage you to breathe deeply.   Nausea and vomiting   You may feel sick to your stomach after surgery; if so, let your nurse know.    Pain control:  After surgery, you may have pain. Our goal is to help you manage your pain. Pain medicine will help you feel comfortable enough to do activities that will help you heal.  These activities may include breathing exercises, walking and physical therapy.   To help your health care team treat your pain we will ask: 1) If you have pain  2) where it is located 3) describe your pain in your words  Methods of pain control include medications given by mouth, vein or by nerve block for some surgeries.  We may give you a pain control pump that will:  1) Deliver the medicine through a tube placed in your vein  2) Control the amount of medicine you receive  3) Allow you to push a button to deliver a dose of pain medicine  Sequential Compression Device (SCD) or Pneumo Boots:  You may need to wear SCD S on your legs or feet. These are wraps connected to a machine that pumps in air and releases it. The repeated pumping helps prevent blood clots from forming.

## 2017-05-19 ENCOUNTER — ANESTHESIA (OUTPATIENT)
Dept: SURGERY | Facility: CLINIC | Age: 57
End: 2017-05-19
Payer: COMMERCIAL

## 2017-05-19 ENCOUNTER — APPOINTMENT (OUTPATIENT)
Dept: GENERAL RADIOLOGY | Facility: CLINIC | Age: 57
End: 2017-05-19
Attending: INTERNAL MEDICINE
Payer: COMMERCIAL

## 2017-05-19 ENCOUNTER — HOSPITAL ENCOUNTER (OUTPATIENT)
Facility: CLINIC | Age: 57
Discharge: HOME | End: 2017-05-19
Attending: INTERNAL MEDICINE | Admitting: INTERNAL MEDICINE
Payer: COMMERCIAL

## 2017-05-19 ENCOUNTER — SURGERY (OUTPATIENT)
Age: 57
End: 2017-05-19

## 2017-05-19 VITALS
SYSTOLIC BLOOD PRESSURE: 130 MMHG | BODY MASS INDEX: 31.39 KG/M2 | WEIGHT: 224.21 LBS | DIASTOLIC BLOOD PRESSURE: 85 MMHG | TEMPERATURE: 98.4 F | RESPIRATION RATE: 16 BRPM | OXYGEN SATURATION: 97 % | HEIGHT: 71 IN

## 2017-05-19 DIAGNOSIS — K85.91 PANCREATITIS, NECROTIZING: Primary | ICD-10-CM

## 2017-05-19 LAB
ALBUMIN SERPL-MCNC: 2.7 G/DL (ref 3.4–5)
ALP SERPL-CCNC: 83 U/L (ref 40–150)
ALT SERPL W P-5'-P-CCNC: 16 U/L (ref 0–70)
AMYLASE SERPL-CCNC: 21 U/L (ref 30–110)
ANION GAP SERPL CALCULATED.3IONS-SCNC: 8 MMOL/L (ref 3–14)
AST SERPL W P-5'-P-CCNC: 11 U/L (ref 0–45)
BILIRUB SERPL-MCNC: 0.8 MG/DL (ref 0.2–1.3)
BUN SERPL-MCNC: 12 MG/DL (ref 7–30)
CALCIUM SERPL-MCNC: 8.4 MG/DL (ref 8.5–10.1)
CHLORIDE SERPL-SCNC: 104 MMOL/L (ref 94–109)
CO2 SERPL-SCNC: 28 MMOL/L (ref 20–32)
CREAT SERPL-MCNC: 0.93 MG/DL (ref 0.66–1.25)
ERYTHROCYTE [DISTWIDTH] IN BLOOD BY AUTOMATED COUNT: 14.2 % (ref 10–15)
GFR SERPL CREATININE-BSD FRML MDRD: 83 ML/MIN/1.7M2
GLUCOSE SERPL-MCNC: 101 MG/DL (ref 70–99)
HCT VFR BLD AUTO: 39.3 % (ref 40–53)
HGB BLD-MCNC: 12.7 G/DL (ref 13.3–17.7)
INR PPP: 1.23 (ref 0.86–1.14)
LIPASE SERPL-CCNC: 94 U/L (ref 73–393)
MCH RBC QN AUTO: 28.2 PG (ref 26.5–33)
MCHC RBC AUTO-ENTMCNC: 32.3 G/DL (ref 31.5–36.5)
MCV RBC AUTO: 87 FL (ref 78–100)
PLATELET # BLD AUTO: 247 10E9/L (ref 150–450)
POTASSIUM SERPL-SCNC: 3.8 MMOL/L (ref 3.4–5.3)
PROT SERPL-MCNC: 6.2 G/DL (ref 6.8–8.8)
RBC # BLD AUTO: 4.5 10E12/L (ref 4.4–5.9)
SODIUM SERPL-SCNC: 139 MMOL/L (ref 133–144)
UPPER EUS: NORMAL
WBC # BLD AUTO: 7.4 10E9/L (ref 4–11)

## 2017-05-19 PROCEDURE — 36000053 ZZH SURGERY LEVEL 2 EA 15 ADDTL MIN - UMMC: Performed by: INTERNAL MEDICINE

## 2017-05-19 PROCEDURE — 25000128 H RX IP 250 OP 636: Performed by: NURSE ANESTHETIST, CERTIFIED REGISTERED

## 2017-05-19 PROCEDURE — 36415 COLL VENOUS BLD VENIPUNCTURE: CPT | Performed by: INTERNAL MEDICINE

## 2017-05-19 PROCEDURE — 40000170 ZZH STATISTIC PRE-PROCEDURE ASSESSMENT II: Performed by: INTERNAL MEDICINE

## 2017-05-19 PROCEDURE — 85027 COMPLETE CBC AUTOMATED: CPT | Performed by: INTERNAL MEDICINE

## 2017-05-19 PROCEDURE — 40000279 XR SURGERY CARM FLUORO GREATER THAN 5 MIN W STILLS: Mod: TC

## 2017-05-19 PROCEDURE — 25000125 ZZHC RX 250: Performed by: INTERNAL MEDICINE

## 2017-05-19 PROCEDURE — 37000009 ZZH ANESTHESIA TECHNICAL FEE, EACH ADDTL 15 MIN: Performed by: INTERNAL MEDICINE

## 2017-05-19 PROCEDURE — 25000566 ZZH SEVOFLURANE, EA 15 MIN: Performed by: INTERNAL MEDICINE

## 2017-05-19 PROCEDURE — 71000014 ZZH RECOVERY PHASE 1 LEVEL 2 FIRST HR: Performed by: INTERNAL MEDICINE

## 2017-05-19 PROCEDURE — 83690 ASSAY OF LIPASE: CPT | Performed by: INTERNAL MEDICINE

## 2017-05-19 PROCEDURE — C9399 UNCLASSIFIED DRUGS OR BIOLOG: HCPCS | Performed by: NURSE ANESTHETIST, CERTIFIED REGISTERED

## 2017-05-19 PROCEDURE — 71000027 ZZH RECOVERY PHASE 2 EACH 15 MINS: Performed by: INTERNAL MEDICINE

## 2017-05-19 PROCEDURE — C1725 CATH, TRANSLUMIN NON-LASER: HCPCS | Performed by: INTERNAL MEDICINE

## 2017-05-19 PROCEDURE — C1769 GUIDE WIRE: HCPCS | Performed by: INTERNAL MEDICINE

## 2017-05-19 PROCEDURE — 25000125 ZZHC RX 250: Performed by: NURSE ANESTHETIST, CERTIFIED REGISTERED

## 2017-05-19 PROCEDURE — 82150 ASSAY OF AMYLASE: CPT | Performed by: INTERNAL MEDICINE

## 2017-05-19 PROCEDURE — 27210794 ZZH OR GENERAL SUPPLY STERILE: Performed by: INTERNAL MEDICINE

## 2017-05-19 PROCEDURE — C1876 STENT, NON-COA/NON-COV W/DEL: HCPCS | Performed by: INTERNAL MEDICINE

## 2017-05-19 PROCEDURE — 80053 COMPREHEN METABOLIC PANEL: CPT | Performed by: INTERNAL MEDICINE

## 2017-05-19 PROCEDURE — 37000008 ZZH ANESTHESIA TECHNICAL FEE, 1ST 30 MIN: Performed by: INTERNAL MEDICINE

## 2017-05-19 PROCEDURE — 85610 PROTHROMBIN TIME: CPT | Performed by: INTERNAL MEDICINE

## 2017-05-19 PROCEDURE — 36000051 ZZH SURGERY LEVEL 2 1ST 30 MIN - UMMC: Performed by: INTERNAL MEDICINE

## 2017-05-19 DEVICE — STENT AXIOS W/DELIVERY SYSTEM 15X10MM AXS-15-10
Type: IMPLANTABLE DEVICE | Status: NON-FUNCTIONAL
Removed: 2017-06-05

## 2017-05-19 DEVICE — STENT SOLUS BILIARY 10FRX03CM DBL PIGTAIL W/INTRO G25670
Type: IMPLANTABLE DEVICE | Status: NON-FUNCTIONAL
Removed: 2017-06-05

## 2017-05-19 RX ORDER — NALOXONE HYDROCHLORIDE 0.4 MG/ML
.1-.4 INJECTION, SOLUTION INTRAMUSCULAR; INTRAVENOUS; SUBCUTANEOUS
Status: DISCONTINUED | OUTPATIENT
Start: 2017-05-19 | End: 2017-05-19 | Stop reason: HOSPADM

## 2017-05-19 RX ORDER — SODIUM CHLORIDE, SODIUM LACTATE, POTASSIUM CHLORIDE, CALCIUM CHLORIDE 600; 310; 30; 20 MG/100ML; MG/100ML; MG/100ML; MG/100ML
INJECTION, SOLUTION INTRAVENOUS CONTINUOUS PRN
Status: DISCONTINUED | OUTPATIENT
Start: 2017-05-19 | End: 2017-05-19

## 2017-05-19 RX ORDER — ONDANSETRON 4 MG/1
4 TABLET, ORALLY DISINTEGRATING ORAL EVERY 30 MIN PRN
Status: DISCONTINUED | OUTPATIENT
Start: 2017-05-19 | End: 2017-05-19 | Stop reason: HOSPADM

## 2017-05-19 RX ORDER — INDOMETHACIN 50 MG/1
100 SUPPOSITORY RECTAL
Status: DISCONTINUED | OUTPATIENT
Start: 2017-05-19 | End: 2017-05-19 | Stop reason: HOSPADM

## 2017-05-19 RX ORDER — LIDOCAINE 40 MG/G
CREAM TOPICAL
Status: DISCONTINUED | OUTPATIENT
Start: 2017-05-19 | End: 2017-05-19 | Stop reason: HOSPADM

## 2017-05-19 RX ORDER — LEVOFLOXACIN 5 MG/ML
INJECTION, SOLUTION INTRAVENOUS PRN
Status: DISCONTINUED | OUTPATIENT
Start: 2017-05-19 | End: 2017-05-19

## 2017-05-19 RX ORDER — ONDANSETRON 2 MG/ML
4 INJECTION INTRAMUSCULAR; INTRAVENOUS EVERY 30 MIN PRN
Status: DISCONTINUED | OUTPATIENT
Start: 2017-05-19 | End: 2017-05-19 | Stop reason: HOSPADM

## 2017-05-19 RX ORDER — SODIUM CHLORIDE, SODIUM LACTATE, POTASSIUM CHLORIDE, CALCIUM CHLORIDE 600; 310; 30; 20 MG/100ML; MG/100ML; MG/100ML; MG/100ML
INJECTION, SOLUTION INTRAVENOUS CONTINUOUS
Status: DISCONTINUED | OUTPATIENT
Start: 2017-05-19 | End: 2017-05-19 | Stop reason: HOSPADM

## 2017-05-19 RX ORDER — FLUMAZENIL 0.1 MG/ML
0.2 INJECTION, SOLUTION INTRAVENOUS
Status: DISCONTINUED | OUTPATIENT
Start: 2017-05-19 | End: 2017-05-19 | Stop reason: HOSPADM

## 2017-05-19 RX ORDER — ONDANSETRON 2 MG/ML
INJECTION INTRAMUSCULAR; INTRAVENOUS PRN
Status: DISCONTINUED | OUTPATIENT
Start: 2017-05-19 | End: 2017-05-19

## 2017-05-19 RX ORDER — FENTANYL CITRATE 50 UG/ML
25-50 INJECTION, SOLUTION INTRAMUSCULAR; INTRAVENOUS
Status: DISCONTINUED | OUTPATIENT
Start: 2017-05-19 | End: 2017-05-19 | Stop reason: HOSPADM

## 2017-05-19 RX ORDER — LIDOCAINE HYDROCHLORIDE 20 MG/ML
INJECTION, SOLUTION INFILTRATION; PERINEURAL PRN
Status: DISCONTINUED | OUTPATIENT
Start: 2017-05-19 | End: 2017-05-19

## 2017-05-19 RX ORDER — HYDROMORPHONE HYDROCHLORIDE 1 MG/ML
.3-.5 INJECTION, SOLUTION INTRAMUSCULAR; INTRAVENOUS; SUBCUTANEOUS EVERY 10 MIN PRN
Status: DISCONTINUED | OUTPATIENT
Start: 2017-05-19 | End: 2017-05-19 | Stop reason: HOSPADM

## 2017-05-19 RX ORDER — LEVOFLOXACIN 500 MG/1
500 TABLET, FILM COATED ORAL DAILY
Qty: 7 TABLET | Refills: 0 | Status: SHIPPED | OUTPATIENT
Start: 2017-05-19 | End: 2018-09-04

## 2017-05-19 RX ORDER — FENTANYL CITRATE 50 UG/ML
INJECTION, SOLUTION INTRAMUSCULAR; INTRAVENOUS PRN
Status: DISCONTINUED | OUTPATIENT
Start: 2017-05-19 | End: 2017-05-19

## 2017-05-19 RX ORDER — MEPERIDINE HYDROCHLORIDE 25 MG/ML
12.5 INJECTION INTRAMUSCULAR; INTRAVENOUS; SUBCUTANEOUS
Status: DISCONTINUED | OUTPATIENT
Start: 2017-05-19 | End: 2017-05-19 | Stop reason: HOSPADM

## 2017-05-19 RX ORDER — PROPOFOL 10 MG/ML
INJECTION, EMULSION INTRAVENOUS PRN
Status: DISCONTINUED | OUTPATIENT
Start: 2017-05-19 | End: 2017-05-19

## 2017-05-19 RX ADMIN — LIDOCAINE HYDROCHLORIDE 80 MG: 20 INJECTION, SOLUTION INFILTRATION; PERINEURAL at 10:41

## 2017-05-19 RX ADMIN — SUGAMMADEX 200 MG: 100 INJECTION, SOLUTION INTRAVENOUS at 11:29

## 2017-05-19 RX ADMIN — PROPOFOL 150 MG: 10 INJECTION, EMULSION INTRAVENOUS at 10:41

## 2017-05-19 RX ADMIN — FENTANYL CITRATE 100 MCG: 50 INJECTION, SOLUTION INTRAMUSCULAR; INTRAVENOUS at 10:41

## 2017-05-19 RX ADMIN — SODIUM CHLORIDE, POTASSIUM CHLORIDE, SODIUM LACTATE AND CALCIUM CHLORIDE: 600; 310; 30; 20 INJECTION, SOLUTION INTRAVENOUS at 10:32

## 2017-05-19 RX ADMIN — FENTANYL CITRATE 50 MCG: 50 INJECTION, SOLUTION INTRAMUSCULAR; INTRAVENOUS at 11:35

## 2017-05-19 RX ADMIN — LEVOFLOXACIN 500 MG: 5 INJECTION, SOLUTION INTRAVENOUS at 10:57

## 2017-05-19 RX ADMIN — ROCURONIUM BROMIDE 10 MG: 10 INJECTION INTRAVENOUS at 10:46

## 2017-05-19 RX ADMIN — PROPOFOL 50 MG: 10 INJECTION, EMULSION INTRAVENOUS at 10:46

## 2017-05-19 RX ADMIN — MIDAZOLAM HYDROCHLORIDE 2 MG: 1 INJECTION, SOLUTION INTRAMUSCULAR; INTRAVENOUS at 10:32

## 2017-05-19 RX ADMIN — ONDANSETRON 4 MG: 2 INJECTION INTRAMUSCULAR; INTRAVENOUS at 11:30

## 2017-05-19 RX ADMIN — SIMETHICONE 2 ML: 63.3; 3.7 SOLUTION/ DROPS ORAL at 11:17

## 2017-05-19 RX ADMIN — FENTANYL CITRATE 50 MCG: 50 INJECTION, SOLUTION INTRAMUSCULAR; INTRAVENOUS at 10:55

## 2017-05-19 RX ADMIN — ROCURONIUM BROMIDE 40 MG: 10 INJECTION INTRAVENOUS at 10:41

## 2017-05-19 NOTE — ANESTHESIA POSTPROCEDURE EVALUATION
Patient: Zackery Rogers    Procedure(s):  Upper Endoscopic Ultrasound with with cyst gastrostomy, hot axios stent - Wound Class: II-Clean Contaminated    Diagnosis:Necrotizing Pancreatits   Diagnosis Additional Information: No value filed.    Anesthesia Type:  General, ETT    Note:  Anesthesia Post Evaluation    Patient location during evaluation: PACU  Patient participation: Able to fully participate in evaluation  Level of consciousness: awake and alert  Pain management: adequate  Airway patency: patent  Cardiovascular status: acceptable  Respiratory status: acceptable  Hydration status: acceptable  PONV: none     Anesthetic complications: None          Last vitals:  Vitals:    05/19/17 0934   BP: (!) 132/91   Resp: 16   Temp: 36.8  C (98.2  F)   SpO2: 96%         Electronically Signed By: Danyelle Mcguire MD  May 19, 2017  12:08 PM

## 2017-05-19 NOTE — BRIEF OP NOTE
[facility], [place]  Gastroenterology Brief Operative Note    Pre-operative diagnosis: Necrotzing pancreatitis   Post-operative diagnosis Same   Procedure:    Surgeon: Dr. Guru Melanie MD   Assistants(s):    Anesthesia: General endotracheal anesthesia   Estimated blood loss: None    Total IV fluids: (See anesthesia record)   Blood transfusion: No transfusion was given during surgery   Total urine output: (See anesthesia record)   Drains: None           Findings: EUS guided cystgastrostomy was performed  A 15 mm by 10 mm Axios stent was placed and 10 Fr by 3 cm double pigtailed stent.   Complications: None.   Condition: Stable   Comments:      Recommendations:         See dictated procedure report for full details (found in chart review under 'Procedures')    - Levaquin 500 mg once daily for 7 days  - CT scan in 10 days  - Further intervention depending on symptoms and response to today's therapy     Guru Melanie MD  [phone]

## 2017-05-19 NOTE — IP AVS SNAPSHOT
Post Anesthesia Care Unit [address] [address]    Phone:  [phone]                                       After Visit Summary   5/19/2017    Zackery Rogers    MRN: [id number]           After Visit Summary Signature Page     I have received my discharge instructions, and my questions have been answered. I have discussed any challenges I see with this plan with the nurse or doctor.    ..........................................................................................................................................  Patient/Patient Representative Signature      ..........................................................................................................................................  Patient Representative Print Name and Relationship to Patient    ..................................................               ................................................  Date                                            Time    ..........................................................................................................................................  Reviewed by Signature/Title    ...................................................              ..............................................  Date                                                            Time

## 2017-05-19 NOTE — ANESTHESIA CARE TRANSFER NOTE
Patient: Zackery Rogers    Procedure(s):  Upper Endoscopic Ultrasound with with cyst gastrostomy, hot axios stent - Wound Class: II-Clean Contaminated    Diagnosis: Necrotizing Pancreatits   Diagnosis Additional Information: No value filed.    Anesthesia Type:   General, ETT     Note:  Airway :Face Mask  Patient transferred to:PACU  Comments: VSS. Ventilating well.      Vitals: (Last set prior to Anesthesia Care Transfer)    CRNA VITALS  5/19/2017 1118 - 5/19/2017 1206      5/19/2017             Pulse: 106    SpO2: 97 %    Resp Rate (observed): 15                Electronically Signed By: DINH Puentes CRNA  May 19, 2017  12:06 PM

## 2017-05-19 NOTE — IP AVS SNAPSHOT
MRN:[id number]                      After Visit Summary   5/19/2017    Zackery Rogers    MRN: [id number]           Thank you!     Thank you for choosing [facility] for your care. Our goal is always to provide you with excellent care. Hearing back from our patients is one way we can continue to improve our services. Please take a few minutes to complete the written survey that you may receive in the mail after you visit with us. Thank you!        Patient Information     Date Of Birth          1960        About your hospital stay     You were admitted on:  May 19, 2017 You last received care in the:  Post Anesthesia Care Unit [facility]    You were discharged on:  May 19, 2017       Who to Call     For medical emergencies, please call 911.  For non-urgent questions about your medical care, please call your primary care provider or clinic, [phone]  For questions related to your surgery, please call your surgery clinic        Attending Provider     Provider Specialty    Guru Diana Navarro MD Gastroenterology       Primary Care Provider Office Phone # Fax #    Kalina DINH Mo [facility] [phone]       [address]  [facility] [address]        After Care Instructions     Discharge Instructions       Resume pre procedure diet            Discharge Instructions       Restart home medications.                  Your next 10 appointments already scheduled     Aug 24, 2017  8:00 AM CDT   (Arrive by 7:45 AM)   Return Visit with Guru Diana Navarro MD   [facility] ([email or web address])    [address] [facility]  [address] [facility] [address] [phone]              Further instructions from your care team       [facility]  Same-Day Surgery   Adult Discharge Orders & Instructions     For 24 hours after surgery    1. Get  plenty of rest.  A responsible adult must stay with you for at least 24 hours after you leave the hospital.   2. Do not drive or use heavy equipment.  If you have weakness or tingling, don't drive or use heavy equipment until this feeling goes away.  3. Do not drink alcohol.  4. Avoid strenuous or risky activities.  Ask for help when climbing stairs.   5. You may feel lightheaded.  IF so, sit for a few minutes before standing.  Have someone help you get up.   6. If you have nausea (feel sick to your stomach): Drink only clear liquids such as apple juice, ginger ale, broth or 7-Up.  Rest may also help.  Be sure to drink enough fluids.  Move to a regular diet as you feel able.  7. You may have a slight fever. Call the doctor if your fever is over 100 F (37.7 C) (taken under the tongue) or lasts longer than 24 hours.  8. You may have a dry mouth, a sore throat, muscle aches or trouble sleeping.  These should go away after 24 hours.  9. Do not make important or legal decisions.   Call your doctor for any of the followin.  Signs of infection (fever, growing tenderness at the surgery site, a large amount of drainage or bleeding, severe pain, foul-smelling drainage, redness, swelling).    2. It has been over 8 to 10 hours since surgery and you are still not able to urinate (pass water).    3.  Headache for over 24 hours.      To contact a doctor, call Dr. Guru Navarro @ [phone] or:    x   [phone] and ask for the resident on call for   Gastroenterology (answered 24 hours a day)  x   Emergency Department:    [facility]: [phone]      Dr. Guru Navarro's instructions:    Levaquin 500 mg once daily for 7 days  - CT scan in 10 days  - Further intervention depending on symptoms and response to today's therapy                Pending Results     No orders found from 2017 to 2017.            Admission Information     Date & Time Provider Department Dept. Phone    2017  "Guru Diana Navarro MD Post Anesthesia Care Unit [facility] [phone]      Your Vitals Were     Blood Pressure Temperature Respirations Height Weight Pulse Oximetry    146/94 98  F (36.7  C) 16 1.803 m (5' 11\") 101.7 kg (224 lb 3.3 oz) 97%    BMI (Body Mass Index)                   31.27 kg/m2           [employer] Information     [employer] gives you secure access to your electronic health record. If you see a primary care provider, you can also send messages to your care team and make appointments. If you have questions, please call your primary care clinic.  If you do not have a primary care provider, please call [phone] and they will assist you.        Care EveryWhere ID     This is your Care EveryWhere ID. This could be used by other organizations to access your [facility] medical records  [id number]           Review of your medicines      UNREVIEWED medicines. Ask your doctor about these medicines        Dose / Directions    lisinopril 40 MG tablet   Commonly known as:  PRINIVIL/ZESTRIL        Dose:  40 mg   Take 40 mg by mouth   Refills:  0       minocycline 100 MG tablet   Commonly known as:  DYNACIN        Dose:  100 mg   Take 100 mg by mouth   Refills:  0         START taking        Dose / Directions    levofloxacin 500 MG tablet   Commonly known as:  LEVAQUIN   Used for:  Pancreatitis, necrotizing        Dose:  500 mg   Take 1 tablet (500 mg) by mouth daily   Quantity:  7 tablet   Refills:  0            Where to get your medicines      These medications were sent to [facility] [address] [address]     Phone:  [phone]     levofloxacin 500 MG tablet                Protect others around you: Learn how to safely use, store and throw away your medicines at www.disposemymeds.org.             Medication List: This is a list of all your medications and when to take them. Check marks below indicate your daily " home schedule. Keep this list as a reference.      Medications           Morning Afternoon Evening Bedtime As Needed    levofloxacin 500 MG tablet   Commonly known as:  LEVAQUIN   Take 1 tablet (500 mg) by mouth daily                                lisinopril 40 MG tablet   Commonly known as:  PRINIVIL/ZESTRIL   Take 40 mg by mouth                                minocycline 100 MG tablet   Commonly known as:  DYNACIN   Take 100 mg by mouth

## 2017-05-22 ENCOUNTER — CARE COORDINATION (OUTPATIENT)
Dept: GASTROENTEROLOGY | Facility: CLINIC | Age: 57
End: 2017-05-22

## 2017-05-22 DIAGNOSIS — K85.91 NECROTIZING PANCREATITIS: Primary | ICD-10-CM

## 2017-05-22 NOTE — PROGRESS NOTES
Post EUS (5/19/2017) with Dr. Navarro: Follow-up    Post procedure recommendations: Please get an CT scan IV contrast to evaluate residual necrosis on 5/29 and repeat necrosectomy with me on 6/5     Patient states he is feeling a little groggy but no pain. Denies N/V/Fever and abdominal pain. Advised he will need to get CT scan done on May 30th since the 29th is a holiday. He plans to get done in [place] as it is closer to him. Advised we will look or images and have them reviewed. He is amenable to repeat procedure as well June 5th. Will expect a call from scheduling with date and time.     Orders placed: CT and repeat necrosectomy.     Contact information verified for future questions/concerns.    Annmarie LAO RN Coordinator  Dr. Oscar, Dr. Hinton & Dr. Lim  Pancreas~Biliary  [phone]

## 2017-05-23 ENCOUNTER — TELEPHONE (OUTPATIENT)
Dept: GASTROENTEROLOGY | Facility: CLINIC | Age: 57
End: 2017-05-23

## 2017-05-23 NOTE — TELEPHONE ENCOUNTER
Gustavo informed that he is scheduled on 6/5 at 7:45 AM with an arrival time of 5:45 AM.  I reminded Gustavo to arrange for a  and someone to monitor him for 24 hours post procedure.     SR 05/23/2017  336p

## 2017-05-24 ENCOUNTER — CARE COORDINATION (OUTPATIENT)
Dept: GASTROENTEROLOGY | Facility: CLINIC | Age: 57
End: 2017-05-24

## 2017-05-24 DIAGNOSIS — K85.91 NECROTIZING PANCREATITIS: Primary | ICD-10-CM

## 2017-05-24 NOTE — PROGRESS NOTES
Post EUS (5/19/2017) with Dr. Navarro : Follow-up    Post procedure recommendations: Based on CT will recommend endoscopic necrosectomy in 2 weeks - Patient and wife warned off symptoms suggestive of stent occlusion including fevers, chills and night sweats which will warrant immediate admission here at [facility]     Left message for Gustavo to call with questions/concerns and to expect a call for his repeat ERCP.     Orders placed: ERCP for further necrosectomy.    Contact information verified for future questions/concerns.    Annmarie LAO RN Coordinator  Dr. Oscar, Dr. Hinton & Dr. Lim  Pancreas~Biliary  [phone]

## 2017-05-26 DIAGNOSIS — K85.91 NECROTIZING PANCREATITIS: Primary | ICD-10-CM

## 2017-05-30 ENCOUNTER — HOSPITAL ENCOUNTER (OUTPATIENT)
Dept: CT IMAGING | Facility: CLINIC | Age: 57
Discharge: HOME | End: 2017-05-30
Attending: STUDENT IN AN ORGANIZED HEALTH CARE EDUCATION/TRAINING PROGRAM | Admitting: STUDENT IN AN ORGANIZED HEALTH CARE EDUCATION/TRAINING PROGRAM
Payer: COMMERCIAL

## 2017-05-30 DIAGNOSIS — K85.91 NECROTIZING PANCREATITIS: ICD-10-CM

## 2017-05-30 PROCEDURE — 25000125 ZZHC RX 250: Performed by: RADIOLOGY

## 2017-05-30 PROCEDURE — 74178 CT ABD&PLV WO CNTR FLWD CNTR: CPT

## 2017-05-30 PROCEDURE — 25000128 H RX IP 250 OP 636: Performed by: RADIOLOGY

## 2017-05-30 RX ORDER — IOPAMIDOL 755 MG/ML
100 INJECTION, SOLUTION INTRAVASCULAR ONCE
Status: COMPLETED | OUTPATIENT
Start: 2017-05-30 | End: 2017-05-30

## 2017-05-30 RX ADMIN — SODIUM CHLORIDE 60 ML: 9 INJECTION, SOLUTION INTRAVENOUS at 07:43

## 2017-05-30 RX ADMIN — IOPAMIDOL 100 ML: 755 INJECTION, SOLUTION INTRAVENOUS at 07:44

## 2017-06-02 ENCOUNTER — ANESTHESIA EVENT (OUTPATIENT)
Dept: SURGERY | Facility: CLINIC | Age: 57
End: 2017-06-02
Payer: COMMERCIAL

## 2017-06-05 ENCOUNTER — ANESTHESIA (OUTPATIENT)
Dept: SURGERY | Facility: CLINIC | Age: 57
End: 2017-06-05
Payer: COMMERCIAL

## 2017-06-05 ENCOUNTER — APPOINTMENT (OUTPATIENT)
Dept: GENERAL RADIOLOGY | Facility: CLINIC | Age: 57
End: 2017-06-05
Attending: INTERNAL MEDICINE
Payer: COMMERCIAL

## 2017-06-05 ENCOUNTER — HOSPITAL ENCOUNTER (OUTPATIENT)
Facility: CLINIC | Age: 57
Discharge: HOME | End: 2017-06-05
Attending: INTERNAL MEDICINE | Admitting: INTERNAL MEDICINE
Payer: COMMERCIAL

## 2017-06-05 VITALS
RESPIRATION RATE: 16 BRPM | SYSTOLIC BLOOD PRESSURE: 129 MMHG | TEMPERATURE: 97.8 F | OXYGEN SATURATION: 96 % | DIASTOLIC BLOOD PRESSURE: 91 MMHG | HEIGHT: 70 IN | BODY MASS INDEX: 30.33 KG/M2 | WEIGHT: 211.86 LBS | HEART RATE: 97 BPM

## 2017-06-05 DIAGNOSIS — K85.91 NECROTIZING PANCREATITIS: Primary | ICD-10-CM

## 2017-06-05 LAB
AMYLASE SERPL-CCNC: 14 U/L (ref 30–110)
GRAM STN SPEC: ABNORMAL
INR PPP: 1.33 (ref 0.86–1.14)
LIPASE SERPL-CCNC: 82 U/L (ref 73–393)
MICRO REPORT STATUS: ABNORMAL
SPECIMEN SOURCE: ABNORMAL
UPPER GI ENDOSCOPY: NORMAL

## 2017-06-05 PROCEDURE — C1725 CATH, TRANSLUMIN NON-LASER: HCPCS | Performed by: INTERNAL MEDICINE

## 2017-06-05 PROCEDURE — 25000128 H RX IP 250 OP 636: Performed by: ANESTHESIOLOGY

## 2017-06-05 PROCEDURE — C1876 STENT, NON-COA/NON-COV W/DEL: HCPCS | Performed by: INTERNAL MEDICINE

## 2017-06-05 PROCEDURE — 25000125 ZZHC RX 250: Performed by: NURSE ANESTHETIST, CERTIFIED REGISTERED

## 2017-06-05 PROCEDURE — 87075 CULTR BACTERIA EXCEPT BLOOD: CPT | Performed by: INTERNAL MEDICINE

## 2017-06-05 PROCEDURE — 87070 CULTURE OTHR SPECIMN AEROBIC: CPT | Performed by: INTERNAL MEDICINE

## 2017-06-05 PROCEDURE — C9399 UNCLASSIFIED DRUGS OR BIOLOG: HCPCS | Performed by: NURSE ANESTHETIST, CERTIFIED REGISTERED

## 2017-06-05 PROCEDURE — 36000061 ZZH SURGERY LEVEL 3 W FLUORO 1ST 30 MIN - UMMC: Performed by: INTERNAL MEDICINE

## 2017-06-05 PROCEDURE — 85610 PROTHROMBIN TIME: CPT | Performed by: INTERNAL MEDICINE

## 2017-06-05 PROCEDURE — 87076 CULTURE ANAEROBE IDENT EACH: CPT | Performed by: INTERNAL MEDICINE

## 2017-06-05 PROCEDURE — 25000125 ZZHC RX 250: Performed by: INTERNAL MEDICINE

## 2017-06-05 PROCEDURE — 27210794 ZZH OR GENERAL SUPPLY STERILE: Performed by: INTERNAL MEDICINE

## 2017-06-05 PROCEDURE — 71000014 ZZH RECOVERY PHASE 1 LEVEL 2 FIRST HR: Performed by: INTERNAL MEDICINE

## 2017-06-05 PROCEDURE — 40000170 ZZH STATISTIC PRE-PROCEDURE ASSESSMENT II: Performed by: INTERNAL MEDICINE

## 2017-06-05 PROCEDURE — 87106 FUNGI IDENTIFICATION YEAST: CPT | Performed by: INTERNAL MEDICINE

## 2017-06-05 PROCEDURE — 82150 ASSAY OF AMYLASE: CPT | Performed by: INTERNAL MEDICINE

## 2017-06-05 PROCEDURE — 25000128 H RX IP 250 OP 636: Performed by: NURSE ANESTHETIST, CERTIFIED REGISTERED

## 2017-06-05 PROCEDURE — 71000027 ZZH RECOVERY PHASE 2 EACH 15 MINS: Performed by: INTERNAL MEDICINE

## 2017-06-05 PROCEDURE — 37000008 ZZH ANESTHESIA TECHNICAL FEE, 1ST 30 MIN: Performed by: INTERNAL MEDICINE

## 2017-06-05 PROCEDURE — 87186 SC STD MICRODIL/AGAR DIL: CPT | Performed by: INTERNAL MEDICINE

## 2017-06-05 PROCEDURE — P9041 ALBUMIN (HUMAN),5%, 50ML: HCPCS | Performed by: NURSE ANESTHETIST, CERTIFIED REGISTERED

## 2017-06-05 PROCEDURE — C1769 GUIDE WIRE: HCPCS | Performed by: INTERNAL MEDICINE

## 2017-06-05 PROCEDURE — 87185 SC STD ENZYME DETCJ PER NZM: CPT | Performed by: INTERNAL MEDICINE

## 2017-06-05 PROCEDURE — 36000059 ZZH SURGERY LEVEL 3 EA 15 ADDTL MIN UMMC: Performed by: INTERNAL MEDICINE

## 2017-06-05 PROCEDURE — 87102 FUNGUS ISOLATION CULTURE: CPT | Performed by: INTERNAL MEDICINE

## 2017-06-05 PROCEDURE — 40000277 XR SURGERY CARM FLUORO LESS THAN 5 MIN W STILLS: Mod: TC

## 2017-06-05 PROCEDURE — 25500064 ZZH RX 255 OP 636: Performed by: INTERNAL MEDICINE

## 2017-06-05 PROCEDURE — 36415 COLL VENOUS BLD VENIPUNCTURE: CPT | Performed by: INTERNAL MEDICINE

## 2017-06-05 PROCEDURE — 25000566 ZZH SEVOFLURANE, EA 15 MIN: Performed by: INTERNAL MEDICINE

## 2017-06-05 PROCEDURE — 87077 CULTURE AEROBIC IDENTIFY: CPT | Performed by: INTERNAL MEDICINE

## 2017-06-05 PROCEDURE — 37000009 ZZH ANESTHESIA TECHNICAL FEE, EACH ADDTL 15 MIN: Performed by: INTERNAL MEDICINE

## 2017-06-05 PROCEDURE — 83690 ASSAY OF LIPASE: CPT | Performed by: INTERNAL MEDICINE

## 2017-06-05 PROCEDURE — S0028 INJECTION, FAMOTIDINE, 20 MG: HCPCS | Performed by: INTERNAL MEDICINE

## 2017-06-05 PROCEDURE — 87205 SMEAR GRAM STAIN: CPT | Performed by: INTERNAL MEDICINE

## 2017-06-05 DEVICE — STENT SOLUS BILIARY 10FRX03CM DBL PIGTAIL W/INTRO G25670: Type: IMPLANTABLE DEVICE | Status: FUNCTIONAL

## 2017-06-05 RX ORDER — FLUMAZENIL 0.1 MG/ML
0.2 INJECTION, SOLUTION INTRAVENOUS
Status: DISCONTINUED | OUTPATIENT
Start: 2017-06-05 | End: 2017-06-05 | Stop reason: HOSPADM

## 2017-06-05 RX ORDER — ONDANSETRON 2 MG/ML
INJECTION INTRAMUSCULAR; INTRAVENOUS PRN
Status: DISCONTINUED | OUTPATIENT
Start: 2017-06-05 | End: 2017-06-05

## 2017-06-05 RX ORDER — FENTANYL CITRATE 50 UG/ML
25-50 INJECTION, SOLUTION INTRAMUSCULAR; INTRAVENOUS EVERY 5 MIN PRN
Status: DISCONTINUED | OUTPATIENT
Start: 2017-06-05 | End: 2017-06-05 | Stop reason: HOSPADM

## 2017-06-05 RX ORDER — ESMOLOL HYDROCHLORIDE 10 MG/ML
INJECTION INTRAVENOUS PRN
Status: DISCONTINUED | OUTPATIENT
Start: 2017-06-05 | End: 2017-06-05

## 2017-06-05 RX ORDER — ALBUMIN, HUMAN INJ 5% 5 %
SOLUTION INTRAVENOUS CONTINUOUS PRN
Status: DISCONTINUED | OUTPATIENT
Start: 2017-06-05 | End: 2017-06-05

## 2017-06-05 RX ORDER — NALOXONE HYDROCHLORIDE 0.4 MG/ML
.1-.4 INJECTION, SOLUTION INTRAMUSCULAR; INTRAVENOUS; SUBCUTANEOUS
Status: DISCONTINUED | OUTPATIENT
Start: 2017-06-05 | End: 2017-06-05 | Stop reason: HOSPADM

## 2017-06-05 RX ORDER — LEVOFLOXACIN 500 MG/1
500 TABLET, FILM COATED ORAL DAILY
Qty: 7 TABLET | Refills: 0 | Status: SHIPPED | OUTPATIENT
Start: 2017-06-05 | End: 2018-09-04

## 2017-06-05 RX ORDER — IOPAMIDOL 510 MG/ML
INJECTION, SOLUTION INTRAVASCULAR PRN
Status: DISCONTINUED | OUTPATIENT
Start: 2017-06-05 | End: 2017-06-05 | Stop reason: HOSPADM

## 2017-06-05 RX ORDER — SODIUM CHLORIDE, SODIUM LACTATE, POTASSIUM CHLORIDE, CALCIUM CHLORIDE 600; 310; 30; 20 MG/100ML; MG/100ML; MG/100ML; MG/100ML
INJECTION, SOLUTION INTRAVENOUS CONTINUOUS
Status: DISCONTINUED | OUTPATIENT
Start: 2017-06-05 | End: 2017-06-05 | Stop reason: HOSPADM

## 2017-06-05 RX ORDER — METOPROLOL TARTRATE 1 MG/ML
INJECTION, SOLUTION INTRAVENOUS PRN
Status: DISCONTINUED | OUTPATIENT
Start: 2017-06-05 | End: 2017-06-05

## 2017-06-05 RX ORDER — ONDANSETRON 2 MG/ML
4 INJECTION INTRAMUSCULAR; INTRAVENOUS ONCE
Status: COMPLETED | OUTPATIENT
Start: 2017-06-05 | End: 2017-06-05

## 2017-06-05 RX ORDER — INDOMETHACIN 50 MG/1
100 SUPPOSITORY RECTAL
Status: DISCONTINUED | OUTPATIENT
Start: 2017-06-05 | End: 2017-06-05 | Stop reason: HOSPADM

## 2017-06-05 RX ORDER — ONDANSETRON 2 MG/ML
4 INJECTION INTRAMUSCULAR; INTRAVENOUS EVERY 30 MIN PRN
Status: DISCONTINUED | OUTPATIENT
Start: 2017-06-05 | End: 2017-06-05 | Stop reason: HOSPADM

## 2017-06-05 RX ORDER — LIDOCAINE 40 MG/G
CREAM TOPICAL
Status: DISCONTINUED | OUTPATIENT
Start: 2017-06-05 | End: 2017-06-05 | Stop reason: HOSPADM

## 2017-06-05 RX ORDER — PROPOFOL 10 MG/ML
INJECTION, EMULSION INTRAVENOUS PRN
Status: DISCONTINUED | OUTPATIENT
Start: 2017-06-05 | End: 2017-06-05

## 2017-06-05 RX ORDER — LIDOCAINE HYDROCHLORIDE 20 MG/ML
INJECTION, SOLUTION INFILTRATION; PERINEURAL PRN
Status: DISCONTINUED | OUTPATIENT
Start: 2017-06-05 | End: 2017-06-05

## 2017-06-05 RX ORDER — SODIUM CHLORIDE, SODIUM LACTATE, POTASSIUM CHLORIDE, CALCIUM CHLORIDE 600; 310; 30; 20 MG/100ML; MG/100ML; MG/100ML; MG/100ML
INJECTION, SOLUTION INTRAVENOUS CONTINUOUS PRN
Status: DISCONTINUED | OUTPATIENT
Start: 2017-06-05 | End: 2017-06-05

## 2017-06-05 RX ORDER — FENTANYL CITRATE 50 UG/ML
INJECTION, SOLUTION INTRAMUSCULAR; INTRAVENOUS PRN
Status: DISCONTINUED | OUTPATIENT
Start: 2017-06-05 | End: 2017-06-05

## 2017-06-05 RX ORDER — DEXAMETHASONE SODIUM PHOSPHATE 4 MG/ML
INJECTION, SOLUTION INTRA-ARTICULAR; INTRALESIONAL; INTRAMUSCULAR; INTRAVENOUS; SOFT TISSUE PRN
Status: DISCONTINUED | OUTPATIENT
Start: 2017-06-05 | End: 2017-06-05

## 2017-06-05 RX ORDER — ONDANSETRON 4 MG/1
4 TABLET, ORALLY DISINTEGRATING ORAL EVERY 30 MIN PRN
Status: DISCONTINUED | OUTPATIENT
Start: 2017-06-05 | End: 2017-06-05 | Stop reason: HOSPADM

## 2017-06-05 RX ORDER — HYDROMORPHONE HYDROCHLORIDE 1 MG/ML
.3-.5 INJECTION, SOLUTION INTRAMUSCULAR; INTRAVENOUS; SUBCUTANEOUS EVERY 5 MIN PRN
Status: DISCONTINUED | OUTPATIENT
Start: 2017-06-05 | End: 2017-06-05 | Stop reason: HOSPADM

## 2017-06-05 RX ORDER — LEVOFLOXACIN 5 MG/ML
INJECTION, SOLUTION INTRAVENOUS PRN
Status: DISCONTINUED | OUTPATIENT
Start: 2017-06-05 | End: 2017-06-05

## 2017-06-05 RX ADMIN — SODIUM CHLORIDE, POTASSIUM CHLORIDE, SODIUM LACTATE AND CALCIUM CHLORIDE: 600; 310; 30; 20 INJECTION, SOLUTION INTRAVENOUS at 06:40

## 2017-06-05 RX ADMIN — PHENYLEPHRINE HYDROCHLORIDE 100 MCG: 10 INJECTION, SOLUTION INTRAMUSCULAR; INTRAVENOUS; SUBCUTANEOUS at 08:29

## 2017-06-05 RX ADMIN — PHENYLEPHRINE HYDROCHLORIDE 100 MCG: 10 INJECTION, SOLUTION INTRAMUSCULAR; INTRAVENOUS; SUBCUTANEOUS at 08:35

## 2017-06-05 RX ADMIN — ONDANSETRON 4 MG: 2 INJECTION INTRAMUSCULAR; INTRAVENOUS at 08:21

## 2017-06-05 RX ADMIN — DEXAMETHASONE SODIUM PHOSPHATE 4 MG: 4 INJECTION, SOLUTION INTRA-ARTICULAR; INTRALESIONAL; INTRAMUSCULAR; INTRAVENOUS; SOFT TISSUE at 08:15

## 2017-06-05 RX ADMIN — PHENYLEPHRINE HYDROCHLORIDE 100 MCG: 10 INJECTION, SOLUTION INTRAMUSCULAR; INTRAVENOUS; SUBCUTANEOUS at 08:09

## 2017-06-05 RX ADMIN — ESMOLOL HYDROCHLORIDE 20 MG: 10 INJECTION, SOLUTION INTRAVENOUS at 08:47

## 2017-06-05 RX ADMIN — PHENYLEPHRINE HYDROCHLORIDE 100 MCG: 10 INJECTION, SOLUTION INTRAMUSCULAR; INTRAVENOUS; SUBCUTANEOUS at 08:50

## 2017-06-05 RX ADMIN — LIDOCAINE HYDROCHLORIDE 100 MG: 20 INJECTION, SOLUTION INFILTRATION; PERINEURAL at 07:55

## 2017-06-05 RX ADMIN — FENTANYL CITRATE 50 MCG: 50 INJECTION, SOLUTION INTRAMUSCULAR; INTRAVENOUS at 09:50

## 2017-06-05 RX ADMIN — METOPROLOL TARTRATE 1 MG: 1 INJECTION, SOLUTION INTRAVENOUS at 08:33

## 2017-06-05 RX ADMIN — FENTANYL CITRATE 100 MCG: 50 INJECTION, SOLUTION INTRAMUSCULAR; INTRAVENOUS at 07:55

## 2017-06-05 RX ADMIN — METOPROLOL TARTRATE 1 MG: 1 INJECTION, SOLUTION INTRAVENOUS at 08:30

## 2017-06-05 RX ADMIN — ONDANSETRON 4 MG: 2 INJECTION INTRAMUSCULAR; INTRAVENOUS at 07:14

## 2017-06-05 RX ADMIN — SUGAMMADEX 200 MG: 100 INJECTION, SOLUTION INTRAVENOUS at 09:37

## 2017-06-05 RX ADMIN — FAMOTIDINE 20 MG: 20 INJECTION, SOLUTION INTRAVENOUS at 07:17

## 2017-06-05 RX ADMIN — ESMOLOL HYDROCHLORIDE 10 MG: 10 INJECTION, SOLUTION INTRAVENOUS at 08:26

## 2017-06-05 RX ADMIN — PHENYLEPHRINE HYDROCHLORIDE 100 MCG: 10 INJECTION, SOLUTION INTRAMUSCULAR; INTRAVENOUS; SUBCUTANEOUS at 08:37

## 2017-06-05 RX ADMIN — ROCURONIUM BROMIDE 40 MG: 10 INJECTION INTRAVENOUS at 08:00

## 2017-06-05 RX ADMIN — FENTANYL CITRATE 50 MCG: 50 INJECTION, SOLUTION INTRAMUSCULAR; INTRAVENOUS at 08:25

## 2017-06-05 RX ADMIN — ALBUMIN (HUMAN): 12.5 SOLUTION INTRAVENOUS at 08:45

## 2017-06-05 RX ADMIN — PHENYLEPHRINE HYDROCHLORIDE 100 MCG: 10 INJECTION, SOLUTION INTRAMUSCULAR; INTRAVENOUS; SUBCUTANEOUS at 08:32

## 2017-06-05 RX ADMIN — ESMOLOL HYDROCHLORIDE 10 MG: 10 INJECTION, SOLUTION INTRAVENOUS at 08:29

## 2017-06-05 RX ADMIN — FENTANYL CITRATE 50 MCG: 50 INJECTION, SOLUTION INTRAMUSCULAR; INTRAVENOUS at 09:37

## 2017-06-05 RX ADMIN — PHENYLEPHRINE HYDROCHLORIDE 100 MCG: 10 INJECTION, SOLUTION INTRAMUSCULAR; INTRAVENOUS; SUBCUTANEOUS at 08:41

## 2017-06-05 RX ADMIN — PHENYLEPHRINE HYDROCHLORIDE 100 MCG: 10 INJECTION, SOLUTION INTRAMUSCULAR; INTRAVENOUS; SUBCUTANEOUS at 08:56

## 2017-06-05 RX ADMIN — SODIUM CHLORIDE, POTASSIUM CHLORIDE, SODIUM LACTATE AND CALCIUM CHLORIDE: 600; 310; 30; 20 INJECTION, SOLUTION INTRAVENOUS at 08:00

## 2017-06-05 RX ADMIN — PROPOFOL 150 MG: 10 INJECTION, EMULSION INTRAVENOUS at 07:55

## 2017-06-05 RX ADMIN — PHENYLEPHRINE HYDROCHLORIDE 100 MCG: 10 INJECTION, SOLUTION INTRAMUSCULAR; INTRAVENOUS; SUBCUTANEOUS at 08:43

## 2017-06-05 RX ADMIN — ESMOLOL HYDROCHLORIDE 10 MG: 10 INJECTION, SOLUTION INTRAVENOUS at 08:27

## 2017-06-05 RX ADMIN — METOPROLOL TARTRATE 1 MG: 1 INJECTION, SOLUTION INTRAVENOUS at 08:58

## 2017-06-05 RX ADMIN — LEVOFLOXACIN 500 MG: 5 INJECTION, SOLUTION INTRAVENOUS at 08:01

## 2017-06-05 RX ADMIN — MIDAZOLAM HYDROCHLORIDE 2 MG: 1 INJECTION, SOLUTION INTRAMUSCULAR; INTRAVENOUS at 07:45

## 2017-06-05 RX ADMIN — PHENYLEPHRINE HYDROCHLORIDE 100 MCG: 10 INJECTION, SOLUTION INTRAMUSCULAR; INTRAVENOUS; SUBCUTANEOUS at 09:00

## 2017-06-05 RX ADMIN — SUCCINYLCHOLINE CHLORIDE 100 MG: 20 INJECTION, SOLUTION INTRAMUSCULAR; INTRAVENOUS at 07:55

## 2017-06-05 ASSESSMENT — LIFESTYLE VARIABLES: TOBACCO_USE: 0

## 2017-06-05 NOTE — BRIEF OP NOTE
[facility] Brief Operative Note    Pre-operative diagnosis: Necrotizing Pancreatitis    Post-operative diagnosis * No post-op diagnosis entered *   Procedure: Procedure(s):  Endoscopic Retrograde Chlangiopancreatogram, Necrosectomy  - Wound Class: II-Clean Contaminated   Surgeon: Guru Melanie MD       Estimated blood loss: None    Specimens: None   Findings:  Axios stent had migrated outwards  Copious amounts of purlent material and endoscopic necrosectomy was performed  Two 10 Fr by 3 cm double pigtailed Solus stents were placed in the necrotic cavity    Recommendations  CT scan in 4 weeks with clinic visit with me  Levaquin 500 mg once daily for 1 week  No further endoscopic interventions anticipated

## 2017-06-05 NOTE — IP AVS SNAPSHOT
MRN:[id number]                      After Visit Summary   6/5/2017    Zackery Rogers    MRN: [id number]           Thank you!     Thank you for choosing [facility] for your care. Our goal is always to provide you with excellent care. Hearing back from our patients is one way we can continue to improve our services. Please take a few minutes to complete the written survey that you may receive in the mail after you visit with us. Thank you!        Patient Information     Date Of Birth          1960        About your hospital stay     You were admitted on:  June 5, 2017 You last received care in the:  Same Day Surgery [facility]    You were discharged on:  June 5, 2017       Who to Call     For medical emergencies, please call 911.  For non-urgent questions about your medical care, please call your primary care provider or clinic, [phone]  For questions related to your surgery, please call your surgery clinic        Attending Provider     Provider Specialty    Guru Diana Navarro MD Gastroenterology       Primary Care Provider Office Phone # Fax #    Kalina DINH Mo [facility] [phone]      After Care Instructions     Discharge Instructions       Resume pre procedure diet            Discharge Instructions       Restart home medications.                  Your next 10 appointments already scheduled     Aug 24, 2017  8:00 AM CDT   (Arrive by 7:45 AM)   Return Visit with Guru Diana Navarro MD   [facility]creas [facility] Biliary ([facility])    [address] [place] [address] [phone]              Further instructions from your care team       [facility]  Same-Day Surgery   Adult Discharge Orders & Instructions     For 24 hours after surgery    1. Get plenty of rest.  A responsible adult must stay with you for at least 24 hours after you leave  the hospital.   2. Do not drive or use heavy equipment.  If you have weakness or tingling, don't drive or use heavy equipment until this feeling goes away.  3. Do not drink alcohol.  4. Avoid strenuous or risky activities.  Ask for help when climbing stairs.   5. You may feel lightheaded.  IF so, sit for a few minutes before standing.  Have someone help you get up.   6. If you have nausea (feel sick to your stomach): Drink only clear liquids such as apple juice, ginger ale, broth or 7-Up.  Rest may also help.  Be sure to drink enough fluids.  Move to a regular diet as you feel able.  7. You may have a slight fever. Call the doctor if your fever is over 100 F (37.7 C) (taken under the tongue) or lasts longer than 24 hours.  8. You may have a dry mouth, a sore throat, muscle aches or trouble sleeping.  These should go away after 24 hours.  9. Do not make important or legal decisions.   Call your doctor for any of the followin.  Signs of infection (fever, growing tenderness at the surgery site, a large amount of drainage or bleeding, severe pain, foul-smelling drainage, redness, swelling).    2. It has been over 8 to 10 hours since surgery and you are still not able to urinate (pass water).    3.  Headache for over 24 hours.      To contact a doctor, call ____Dr. Guru Navarro @ [phone]____________________________________ or:        [phone] and ask for the resident on call for   _____gastroenterology__________ (answered 24 hours a day)      Emergency Department:    [facility]: [phone]       (TTY for hearing impaired: [phone])        Pending Results     Date and Time Order Name Status Description    2017 0839 Gram stain In process     2017 0839 Fungus Culture, non-blood In process     2017 0839 Fluid Culture Aerobic Bacterial In process     2017 0839 Anaerobic bacterial culture In process     2017 0806 XR Surgery VINH Fluoro L/T 5 Min w Stills In process         "     Admission Information     Date & Time Provider Department Dept. Phone    6/5/2017 Melanie Chiragtip Bacon MD Same Day Surgery [facility] [phone]      Your Vitals Were     Blood Pressure Pulse Temperature Respirations Height Weight    130/95 97 97.5  F (36.4  C) (Oral) 16 1.778 m (5' 10\") 96.1 kg (211 lb 13.8 oz)    Pulse Oximetry BMI (Body Mass Index)                95% 30.4 kg/m2          MyChart Information     [employer] gives you secure access to your electronic health record. If you see a primary care provider, you can also send messages to your care team and make appointments. If you have questions, please call your primary care clinic.  If you do not have a primary care provider, please call [phone] and they will assist you.        Care EveryWhere ID     This is your Care EveryWhere ID. This could be used by other organizations to access your [facility] medical records  [id number]           Review of your medicines      CONTINUE these medicines which may have CHANGED, or have new prescriptions. If we are uncertain of the size of tablets/capsules you have at home, strength may be listed as something that might have changed.        Dose / Directions    * levofloxacin 500 MG tablet   Commonly known as:  LEVAQUIN   This may have changed:  Another medication with the same name was added. Make sure you understand how and when to take each.   Used for:  Pancreatitis, necrotizing        Dose:  500 mg   Take 1 tablet (500 mg) by mouth daily   Quantity:  7 tablet   Refills:  0       * levofloxacin 500 MG tablet   Commonly known as:  LEVAQUIN   This may have changed:  You were already taking a medication with the same name, and this prescription was added. Make sure you understand how and when to take each.   Used for:  Necrotizing pancreatitis        Dose:  500 mg   Take 1 tablet (500 mg) by mouth daily   Quantity:  7 tablet   Refills:  0       * Notice:  This list has 2 medication(s) that " are the same as other medications prescribed for you. Read the directions carefully, and ask your doctor or other care provider to review them with you.      CONTINUE these medicines which have NOT CHANGED        Dose / Directions    lisinopril 40 MG tablet   Commonly known as:  PRINIVIL/ZESTRIL        Dose:  40 mg   Take 40 mg by mouth   Refills:  0         STOP taking     minocycline 100 MG tablet   Commonly known as:  DYNACIN                Where to get your medicines      These medications were sent to [facility] - [place], [place] - [address] [facility]  [address] [facility] [address]     Phone:  [phone]     levofloxacin 500 MG tablet                Protect others around you: Learn how to safely use, store and throw away your medicines at www.disposemymeds.org.             Medication List: This is a list of all your medications and when to take them. Check marks below indicate your daily home schedule. Keep this list as a reference.      Medications           Morning Afternoon Evening Bedtime As Needed    * levofloxacin 500 MG tablet   Commonly known as:  LEVAQUIN   Take 1 tablet (500 mg) by mouth daily                                * levofloxacin 500 MG tablet   Commonly known as:  LEVAQUIN   Take 1 tablet (500 mg) by mouth daily                                lisinopril 40 MG tablet   Commonly known as:  PRINIVIL/ZESTRIL   Take 40 mg by mouth                                * Notice:  This list has 2 medication(s) that are the same as other medications prescribed for you. Read the directions carefully, and ask your doctor or other care provider to review them with you.              More Information        Discharge Instructions for ERCP (Endoscopic Retrograde Cholangiopancreatography)  You had a procedure known as an ERCP. Your healthcare provider performed the ERCP to look at your bile or pancreatic ducts, and to locate and treat blockages in the ducts. This procedure is used  to diagnose diseases of the pancreas, bile ducts, and pancreatic duct, liver, and gallbladder. Here s what you need to do following your ERCP.  Home care    Don t take aspirin or any other blood-thinning medicines (anticoagulants) until your provider says it s OK.    Your provider may prescribe an antibiotic, depending on what was done during the ERCP.    You may have a sore throat for 1 to 2 days after the procedure. Use lozenges or gargle with salt water for your sore throat. If you're not better in a few days, call your provider.    Rest, drink fluids, and eat light meals. If you feel bloated or have too much gas, use a heating pad on your belly to help reduce the discomfort. This should help you feel better. But if it doesn't, call your provider.    Don t drink alcohol for 2 days after the procedure.  Follow-up care  Make a follow-up appointment as directed by our staff.     When to seek medical care  Call your provider right away if you have any of the following:    Trouble swallowing or throat pain that gets worse     Chest pain or severe belly or abdominal pain    Fever above 100 F (37.7 C) or chills    Upset stomach (nausea) and vomiting    Black or tarry stools     8871-1193 [employer]. [address] [place] [address], [place] [address]. All rights reserved. This information is not intended as a substitute for professional medical care. Always follow your healthcare professional's instructions.

## 2017-06-05 NOTE — ANESTHESIA PREPROCEDURE EVALUATION
Anesthesia Evaluation     .             ROS/MED HX    ENT/Pulmonary:       Neurologic:       Cardiovascular:     (+) hypertension----. : . . . :. .       METS/Exercise Tolerance:     Hematologic:         Musculoskeletal:         GI/Hepatic:         Renal/Genitourinary:         Endo:     (+) Obesity, .      Psychiatric:         Infectious Disease:         Malignancy:         Other:                                    Anesthesia Plan      History & Physical Review  History and physical reviewed and following examination; no interval change.    ASA Status:  3 .    NPO Status:  > 8 hours    Plan for General and ETT with Intravenous induction. Maintenance will be Balanced.    PONV prophylaxis:  Ondansetron (or other 5HT-3) and Dexamethasone or Solumedrol  Additional equipment: 2nd IV      Postoperative Care  Postoperative pain management:  IV analgesics.      Consents  Anesthetic plan, risks, benefits and alternatives discussed with:  Patient..            Anesthesia Attending Preoperative Note    HPI: Zackery Rogers is a 56 year old male who  has a past medical history of DISH (diffuse idiopathic skeletal hyperostosis); History of acute pancreatitis (2015); Hypertension; Obesity; Pancreatic mass; and Rosacea.  has a past surgical history that includes no history of surgery and Endoscopic ultrasound upper gastrointestinal tract (GI) (N/A, 5/19/2017). with a Necrotizing Pancreatitis  who is scheduled for Procedure(s):  Endoscopic Retrograde Chlangiopancreatogram, Necrosectomy  - Wound Class: II-Clean Contaminated.      PMHx/PSHx/ROS:  Past Medical History:   Diagnosis Date     DISH (diffuse idiopathic skeletal hyperostosis)      History of acute pancreatitis 2015     Hypertension      Obesity      Pancreatic mass      Rosacea        Past Surgical History:   Procedure Laterality Date     ENDOSCOPIC ULTRASOUND UPPER GASTROINTESTINAL TRACT (GI) N/A 5/19/2017    Procedure: ENDOSCOPIC ULTRASOUND, ESOPHAGOSCOPY / UPPER  GASTROINTESTINAL TRACT (GI);  Upper Endoscopic Ultrasound with with cyst gastrostomy, hot axios stent;  Surgeon: Guru Diana Navarro MD;  Location: [facility] OR     NO HISTORY OF SURGERY         Soc Hx:   Social History   Substance Use Topics     Smoking status: Never Smoker     Smokeless tobacco: Not on file     Alcohol use Yes      Comment: OCC         Allergies: No Known Allergies    Meds:   Prescriptions Prior to Admission   Medication Sig Dispense Refill Last Dose     levofloxacin (LEVAQUIN) 500 MG tablet Take 1 tablet (500 mg) by mouth daily 7 tablet 0 6/3/2017     lisinopril (PRINIVIL/ZESTRIL) 40 MG tablet Take 40 mg by mouth   6/3/2017     minocycline (DYNACIN) 100 MG tablet Take 100 mg by mouth   6/3/2017       No current outpatient prescriptions on file.         Labs:  BMP:  Recent Labs   Lab Test  05/19/17   1005   NA  139   POTASSIUM  3.8   CHLORIDE  104   CO2  28   BUN  12   CR  0.93   GLC  101*   NANCY  8.4*     CBC:   Recent Labs   Lab Test  05/19/17   1005   WBC  7.4   RBC  4.50   HGB  12.7*   HCT  39.3*   MCV  87   MCH  28.2   MCHC  32.3   RDW  14.2   PLT  247     Coags:  Recent Labs   Lab Test  05/19/17   1005   INR  1.23*     Vital Signs:  T 99.1  /92  R 18  SpO2 99%    Anesthetic Assessment and Plan:    56 year old male who  has a past medical history of DISH (diffuse idiopathic skeletal hyperostosis); History of acute pancreatitis (2015); Hypertension; Obesity; Pancreatic mass; and Rosacea. to OR today for Procedure(s):  Endoscopic Retrograde Chlangiopancreatogram, Necrosectomy  - Wound Class: II-Clean Contaminated    NPO status adequate. Patient reports feeling nausea and abdominal discomfort likely related to the recently placed stent. He is also tachycardic.  IV fluids started, Pepcid and Zofran ordered preoperatively.    ASA 2    Plan for GA, standard monitors, PONV prophylaxis. Antibiotics per surgery.    Prior to anesthetic I have interviewed and examined the patient,  reviewed the past medical history, laboratory and other results, and discussed the anesthetic plan with the anesthesia and surgery teams.  Anesthetic risks discussed with the patient, he verbalizes understanding and wishes to proceed.  Consent in chart.      Deepa Cain MD  Anesthesiology Attending  06/05/17                 .

## 2017-06-05 NOTE — IP AVS SNAPSHOT
Same Day Surgery [address] [address]    Phone:  [phone]                                       After Visit Summary   6/5/2017    Zackery Rogers    MRN: [id number]           After Visit Summary Signature Page     I have received my discharge instructions, and my questions have been answered. I have discussed any challenges I see with this plan with the nurse or doctor.    ..........................................................................................................................................  Patient/Patient Representative Signature      ..........................................................................................................................................  Patient Representative Print Name and Relationship to Patient    ..................................................               ................................................  Date                                            Time    ..........................................................................................................................................  Reviewed by Signature/Title    ...................................................              ..............................................  Date                                                            Time

## 2017-06-05 NOTE — ANESTHESIA CARE TRANSFER NOTE
Patient: Zackery Rogers    Procedure(s):  Endoscopic Retrograde Chlangiopancreatogram, Necrosectomy  - Wound Class: II-Clean Contaminated    Diagnosis: Necrotizing Pancreatitis   Diagnosis Additional Information: No value filed.    Anesthesia Type:   No value filed.     Note:  Airway :Face Mask  Patient transferred to:PACU  Comments: To PACU, VSS, airway patent, patient alert, RN at bedside.      Vitals: (Last set prior to Anesthesia Care Transfer)    CRNA VITALS  6/5/2017 0917 - 6/5/2017 0954      6/5/2017             Pulse: 112    SpO2: 100 %    Resp Rate (observed): 22    Resp Rate (set): 18                Electronically Signed By: DINH Fiore CRNA  June 5, 2017  9:54 AM

## 2017-06-05 NOTE — ANESTHESIA POSTPROCEDURE EVALUATION
Patient: Zackery Rogers    Procedure(s):  Endoscopic Retrograde Chlangiopancreatogram, Necrosectomy  - Wound Class: II-Clean Contaminated    Diagnosis:Necrotizing Pancreatitis   Diagnosis Additional Information: No value filed.    Anesthesia Type:  No value filed.    Note:  Anesthesia Post Evaluation    Patient location during evaluation: PACU  Patient participation: Able to fully participate in evaluation  Level of consciousness: awake and alert  Pain management: adequate  Airway patency: patent  Cardiovascular status: hemodynamically stable  Respiratory status: acceptable  Hydration status: acceptable  PONV: none     Anesthetic complications: None          Last vitals:  Vitals:    06/05/17 1046 06/05/17 1100 06/05/17 1121   BP: (!) 130/95 (!) 128/91 (!) 129/91   Pulse: 97     Resp: 16 18 16   Temp: 36.4  C (97.5  F)  36.6  C (97.8  F)   SpO2: 95% 95% 96%         Electronically Signed By: Deepa Cain MD  June 5, 2017  11:31 AM

## 2017-06-05 NOTE — OR NURSING
"Pt has report of nausea and overall unwell feeling. Pt was not able to eat much yesterday. HR in 120's. Mild temp of 99.1. Pt states that his sick feeling is primarily localized to the abdomen and feels discomfort where his stent was placed two weeks ago. Pt declining desire for nausea medication at this time. He would prefer to simply have his surgery to help him feel better. Text page sent to Dr. Gonzalez - \"Pt feeling nauseous, HR 120s, temp 99.1. Feeling pain in abdomen where stent was placed two weeks ago.\" Awaiting further assessment by MD.  "

## 2017-06-05 NOTE — ANESTHESIA PREPROCEDURE EVALUATION
Anesthesia Evaluation     . Pt has not had prior anesthetic            ROS/MED HX    ENT/Pulmonary:     (+)KAREL risk factors hypertension, other ENT- recurrent sinus congestion, , . .   (-) tobacco use   Neurologic:  - neg neurologic ROS     Cardiovascular: Comment: Mild BLE edema since abdominal symptoms started a couple months ago.  Edema gradually worsens throughout day. primary care provider aware and evaluated.    (+) hypertension-range: unknown, ---. : . . . :. . Previous cardiac testing date:results:date: results:ECG reviewed date:3/30/2015 results:NSR date: results:         (-) VALDEZ   METS/Exercise Tolerance:  4 - Raking leaves, gardening   Hematologic:  - neg hematologic  ROS       Musculoskeletal:   (+) , , other musculoskeletal- DISH, multiple joint pain      GI/Hepatic:     (+) Other GI/Hepatic pancreatic mass      Renal/Genitourinary:  - ROS Renal section negative       Endo:     (+) Obesity, .      Psychiatric:  - neg psychiatric ROS       Infectious Disease:  - neg infectious disease ROS       Malignancy:      - no malignancy   Other: Comment: rosacea   (+) no H/O Chronic Pain,                   Physical Exam  Normal systems: cardiovascular and pulmonary    Airway   Mallampati: II  TM distance: >3 FB  Neck ROM: full    Dental   (+) chipped  Comment: Multiple chipped teeth    Cardiovascular   Rhythm and rate: regular and normal      Pulmonary    breath sounds clear to auscultation                 PAC Discussion and Assessment    ASA Classification: 2  Case is suitable for: [place]  Anesthetic techniques and relevant risks discussed: GA  Invasive monitoring and risk discussed: No  Types:   Possibility and Risk of blood transfusion discussed: No  NPO instructions given:   Additional anesthetic preparation and risks discussed:   Needs early admission to pre-op area:   Other:     PAC Resident/NP Anesthesia Assessment:  Zackery Rogers is a 56 year old male scheduled for an EUS and ERCP on 5/19/2017 by   Melanie in evaluation of a pancreatic mass that is thought to possibly be necrotic.  PAC referral for risk assessment and optimization for anesthesia with comorbid conditions of: hypertension, DISH, polymyalgia, rosacea, and obesity.      Pre-operative considerations:  1.  Cardiac:  Functional status- METS 4.  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  He doesn't note any exertional dyspnea with activity.  Low risk surgery with 0.4% risk of major adverse cardiac event.  No further cardiac evaluation needed per 2014 ACC/AHA guidelines for non-cardiac surgery.  2.  Pulm:  Airway feasible.  KAREL risk: intermediate.  He is obese with a BMI >30.    3.  GI:  Risk of PONV score = 1.  If > 2, anti-emetic intervention recommended.  4. Musculoskeletal:  Patient has DISH and multiple joint pain; consider cautious positioning.      VTE risk:  0.5%    Patient is optimized and is acceptable candidate for the proposed procedure.  No further diagnostic evaluation is needed.     Patient discussed with Dr. Hale.     For further details of assessment, testing, and physical exam please see H and P completed on same date.          Ness Davenport DNP, RN, APRN      Reviewed and Signed by PAC Mid-Level Provider/Resident  Mid-Level Provider/Resident: Ness Davenport DNP, RN, APRN  Date: 5/18/2017  Time: 1428    Attending Anesthesiologist Anesthesia Assessment:  56 year old for EUS, ERCP. Patient/case discussed with AILYN. No need to see patient. Patient is appropriate for the planned procedure without further work-up or medical management.      Reviewed and Signed by PAC Anesthesiologist  Anesthesiologist: alonzo  Date: 5/18/2017  Time:   Pass/Fail: Pass  Disposition:     PAC Pharmacist Assessment:        Pharmacist:   Date:   Time:      Anesthesia Plan      History & Physical Review  History and physical reviewed and following examination; no interval  change.    ASA Status:  2 .    NPO Status:  > 8 hours    Plan for General and ETT with Intravenous induction. Maintenance will be Balanced.    PONV prophylaxis:  Ondansetron (or other 5HT-3) and Other (See comment)       Postoperative Care  Postoperative pain management:  IV analgesics and Multi-modal analgesia.      Consents  Anesthetic plan, risks, benefits and alternatives discussed with:  Patient..                          .

## 2017-06-05 NOTE — OR NURSING
Pt unable to consume many fluids yesterday d/t nausea. IV started and fluids initiated to help with pt's nausea and decrease HR d/t potential dehydration.

## 2017-06-05 NOTE — DISCHARGE INSTRUCTIONS
[facility]  Same-Day Surgery   Adult Discharge Orders & Instructions     For 24 hours after surgery    1. Get plenty of rest.  A responsible adult must stay with you for at least 24 hours after you leave the hospital.   2. Do not drive or use heavy equipment.  If you have weakness or tingling, don't drive or use heavy equipment until this feeling goes away.  3. Do not drink alcohol.  4. Avoid strenuous or risky activities.  Ask for help when climbing stairs.   5. You may feel lightheaded.  IF so, sit for a few minutes before standing.  Have someone help you get up.   6. If you have nausea (feel sick to your stomach): Drink only clear liquids such as apple juice, ginger ale, broth or 7-Up.  Rest may also help.  Be sure to drink enough fluids.  Move to a regular diet as you feel able.  7. You may have a slight fever. Call the doctor if your fever is over 100 F (37.7 C) (taken under the tongue) or lasts longer than 24 hours.  8. You may have a dry mouth, a sore throat, muscle aches or trouble sleeping.  These should go away after 24 hours.  9. Do not make important or legal decisions.   Call your doctor for any of the followin.  Signs of infection (fever, growing tenderness at the surgery site, a large amount of drainage or bleeding, severe pain, foul-smelling drainage, redness, swelling).    2. It has been over 8 to 10 hours since surgery and you are still not able to urinate (pass water).    3.  Headache for over 24 hours.      To contact a doctor, call ____Dr. Guru Navarro @ [phone]____________________________________ or:        [phone] and ask for the resident on call for   _____gastroenterology__________ (answered 24 hours a day)      Emergency Department:    [facility]: [phone]       (TTY for hearing impaired: [phone])

## 2017-06-07 ENCOUNTER — CARE COORDINATION (OUTPATIENT)
Dept: GASTROENTEROLOGY | Facility: CLINIC | Age: 57
End: 2017-06-07

## 2017-06-07 DIAGNOSIS — K85.91 NECROTIZING PANCREATITIS: Primary | ICD-10-CM

## 2017-06-07 NOTE — PROGRESS NOTES
Post ERCP (6/5/2017) with Dr. Navarro: Follow-up    Post procedure recommendations: Will repeat CT scan with IV contrast after 4 weeks to confirm resolution of necrosis and clinic visit with - No further endoscopic necrosectomy anticipated and we explained that we will leave the two double pigtailed Solus stents indefinitely across the cystgastrostomy tract.     Left message for him to call to confirm message receipt. He is scheduled with Dr. Navarro at 1130 on July 13th and will need CT scan prior, number given to call to schedule CT scan.     Orders placed: CT scan and clinic scheduled.   Clinic scheduled for July 13th at 1130am.     Contact information verified for future questions/concerns.    Annmarie LAO RN Coordinator  Dr. Oscar, Dr. Hinton & Dr. Navarro  [facility]  [phone]

## 2017-06-08 ENCOUNTER — CARE COORDINATION (OUTPATIENT)
Dept: GASTROENTEROLOGY | Facility: CLINIC | Age: 57
End: 2017-06-08

## 2017-06-08 DIAGNOSIS — B49 FUNGAL INFECTION: Primary | ICD-10-CM

## 2017-06-08 RX ORDER — FLUCONAZOLE 200 MG/1
200 TABLET ORAL DAILY
Qty: 10 TABLET | Refills: 0 | Status: SHIPPED | OUTPATIENT
Start: 2017-06-08 | End: 2018-09-04

## 2017-06-08 NOTE — PROGRESS NOTES
Message received to send medication for fungal infection to Gustavo from Dr. Navarro.  Please call pt and see how he is doing. Will need to start fluconazole for fungal infection     Medication ordered and sent to preferred pharmacy.     Message left for patient to call back and discuss his follow-up and the medication that was sent.     Annmarie LAO, RN Coordinator  Dr. Oscar, Dr. Hinton & Dr. Navarro   [facility]  [phone] #4

## 2017-06-10 LAB
BACTERIA SPEC CULT: ABNORMAL
Lab: ABNORMAL
MICRO REPORT STATUS: ABNORMAL
SPECIMEN SOURCE: ABNORMAL

## 2017-06-11 LAB
BACTERIA SPEC CULT: ABNORMAL
MICRO REPORT STATUS: ABNORMAL
MICROORGANISM SPEC CULT: ABNORMAL
MICROORGANISM SPEC CULT: ABNORMAL
SPECIMEN SOURCE: ABNORMAL

## 2017-06-14 ENCOUNTER — CARE COORDINATION (OUTPATIENT)
Dept: GASTROENTEROLOGY | Facility: CLINIC | Age: 57
End: 2017-06-14

## 2017-06-14 NOTE — PROGRESS NOTES
Message left for Gustavo to get CT scan done prior to his clinic appt. Number to schedule CT given. Asked him to call back to confirm receipt of message.    Annmarie LAO, RN Coordinator  Dr. Oscar, Dr. Hinton & Dr. Navarro   [facility]  [phone] #4

## 2017-06-20 ENCOUNTER — CARE COORDINATION (OUTPATIENT)
Dept: GASTROENTEROLOGY | Facility: CLINIC | Age: 57
End: 2017-06-20

## 2017-06-20 NOTE — PROGRESS NOTES
Message left explaining he is scheduled for clinic with Dr. Navarro in July for follow-up. He will need CT scan done at least one day prior to his clinic appointment. Number given for him to schedule.   Contact information left for him.     Annmarie LAO RN Coordinator  Dr. Oscar, Dr. Hinton & Dr. Navarro   [facility]  [phone] #4

## 2017-06-27 ENCOUNTER — TELEPHONE (OUTPATIENT)
Dept: GASTROENTEROLOGY | Facility: CLINIC | Age: 57
End: 2017-06-27

## 2017-06-27 NOTE — TELEPHONE ENCOUNTER
Spoke with Zackery, reminded him of upcoming appointment with Dr. Lim 7/13 at 11:30.  Also has orders in place for CT prior to appointment.  He states [facility] is where he usually goes.  Advised he call and make appointment.  If issues with orders he will call us back.

## 2017-07-03 LAB
FUNGUS SPEC CULT: ABNORMAL
MICRO REPORT STATUS: ABNORMAL
SPECIMEN SOURCE: ABNORMAL

## 2017-07-11 ENCOUNTER — HOSPITAL ENCOUNTER (OUTPATIENT)
Dept: CT IMAGING | Facility: CLINIC | Age: 57
Discharge: HOME | End: 2017-07-11
Admitting: INTERNAL MEDICINE
Payer: COMMERCIAL

## 2017-07-11 PROCEDURE — 25000125 ZZHC RX 250: Performed by: RADIOLOGY

## 2017-07-11 PROCEDURE — 25000128 H RX IP 250 OP 636: Performed by: RADIOLOGY

## 2017-07-11 PROCEDURE — 74177 CT ABD & PELVIS W/CONTRAST: CPT

## 2017-07-11 RX ORDER — IOPAMIDOL 755 MG/ML
100 INJECTION, SOLUTION INTRAVASCULAR ONCE
Status: COMPLETED | OUTPATIENT
Start: 2017-07-11 | End: 2017-07-11

## 2017-07-11 RX ADMIN — IOPAMIDOL 100 ML: 755 INJECTION, SOLUTION INTRAVENOUS at 08:43

## 2017-07-11 RX ADMIN — SODIUM CHLORIDE 56 ML: 9 INJECTION, SOLUTION INTRAVENOUS at 08:42

## 2017-07-13 ENCOUNTER — OFFICE VISIT (OUTPATIENT)
Dept: GASTROENTEROLOGY | Facility: CLINIC | Age: 57
End: 2017-07-13

## 2017-07-13 VITALS
HEART RATE: 99 BPM | OXYGEN SATURATION: 97 % | TEMPERATURE: 98.3 F | BODY MASS INDEX: 31.24 KG/M2 | SYSTOLIC BLOOD PRESSURE: 119 MMHG | WEIGHT: 218.2 LBS | DIASTOLIC BLOOD PRESSURE: 81 MMHG | HEIGHT: 70 IN

## 2017-07-13 DIAGNOSIS — K85.02 IDIOPATHIC ACUTE PANCREATITIS WITH INFECTED NECROSIS: Primary | ICD-10-CM

## 2017-07-13 ASSESSMENT — PAIN SCALES - GENERAL: PAINLEVEL: NO PAIN (0)

## 2017-07-13 NOTE — NURSING NOTE
"Chief Complaint   Patient presents with     [facility] F/U       Vitals:    07/13/17 1130   BP: 119/81   BP Location: Left arm   Patient Position: Chair   Cuff Size: Adult Regular   Pulse: 99   Temp: 98.3  F (36.8  C)   SpO2: 97%   Weight: 218 lb 3.2 oz   Height: 5' 10\"       Body mass index is 31.31 kg/(m^2).                            "

## 2017-07-13 NOTE — MR AVS SNAPSHOT
After Visit Summary   7/13/2017    Zackery Rogers    MRN: [id number]           Patient Information     Date Of Birth          1960        Visit Information        Provider Department      7/13/2017 11:30 AM Guru Diana Navarro MD [facility]        Today's Diagnoses     Idiopathic acute pancreatitis with infected necrosis    -  1       Follow-ups after your visit        Who to contact     Please call your clinic at [phone] to:    Ask questions about your health    Make or cancel appointments    Discuss your medicines    Learn about your test results    Speak to your doctor   If you have compliments or concerns about an experience at your clinic, or if you wish to file a complaint, please contact [facility] Patient Relations at [phone] or email us at [email or web address]         Additional Information About Your Visit        MyChart Information     [employer] gives you secure access to your electronic health record. If you see a primary care provider, you can also send messages to your care team and make appointments. If you have questions, please call your primary care clinic.  If you do not have a primary care provider, please call [phone] and they will assist you.      [employer] is an electronic gateway that provides easy, online access to your medical records. With [employer], you can request a clinic appointment, read your test results, renew a prescription or communicate with your care team.     To access your existing account, please contact your [facility] or call [phone] for assistance.        Care EveryWhere ID     This is your Care EveryWhere ID. This could be used by other organizations to access your [facility] medical records  [id number]        Your Vitals Were     Pulse Temperature Height Pulse Oximetry BMI (Body Mass Index)       99 98.3  F (36.8  C) 1.778 m (5'  "10\") 97% 31.31 kg/m2        Blood Pressure from Last 3 Encounters:   07/13/17 119/81   06/05/17 (!) 129/91   05/19/17 130/85    Weight from Last 3 Encounters:   07/13/17 99 kg (218 lb 3.2 oz)   06/05/17 96.1 kg (211 lb 13.8 oz)   05/19/17 101.7 kg (224 lb 3.3 oz)              Today, you had the following     No orders found for display       Primary Care Provider Office Phone # Fax #    Kalina Lopezean, DINH [phone]       [facility] [address]  [place] [address]        Equal Access to Services     ELLY RIOS : Hadii aad ku hadasho Soearnestine, waaxda luqadaha, qaybta kaalmada adeegyada, waxministerio rbuy . So [facility] [phone].    ATENCIÓN: Si habla español, tiene a sen disposición servicios gratuitos de asistencia lingüística. Ll[facility] [phone].    We comply with applicable federal civil rights laws and [place] laws. We do not discriminate on the basis of race, color, national origin, age, disability sex, sexual orientation or gender identity.            Thank you!     Thank you for choosing [facility]  for your care. Our goal is always to provide you with excellent care. Hearing back from our patients is one way we can continue to improve our services. Please take a few minutes to complete the written survey that you may receive in the mail after your visit with us. Thank you!             Your Updated Medication List - Protect others around you: Learn how to safely use, store and throw away your medicines at www.disposemymeds.org.          This list is accurate as of: 7/13/17 12:46 PM.  Always use your most recent med list.                   Brand Name Dispense Instructions for use Diagnosis    fluconazole 200 MG tablet    DIFLUCAN    10 tablet    Take 1 tablet (200 mg) by mouth daily    Fungal infection       * levofloxacin 500 MG tablet    LEVAQUIN    7 tablet    Take 1 tablet (500 mg) by mouth daily    " Pancreatitis, necrotizing       * levofloxacin 500 MG tablet    LEVAQUIN    7 tablet    Take 1 tablet (500 mg) by mouth daily    Necrotizing pancreatitis       lisinopril 40 MG tablet    PRINIVIL/ZESTRIL     Take 40 mg by mouth        * Notice:  This list has 2 medication(s) that are the same as other medications prescribed for you. Read the directions carefully, and ask your doctor or other care provider to review them with you.

## 2017-07-13 NOTE — PROGRESS NOTES
REQUESTING PHYSICIAN:  DINH Mendez, CNP.       REASON FOR CONSULTATION:  Necrotizing pancreatitis.       CHIEF COMPLAINT:  Loss of weight and failure to thrive, night sweats.       HISTORY OF PRESENT ILLNESS:  This is a very pleasant 56-year-old gentleman with past medical history significant for hypertension, colitis and a previous episode of idiopathic acute pancreatitis in 2015, who was referred for a new peripancreatic collection on 5/18/17.  The patient reports that he was admitted for nearly 2 weeks at [facility] from 03/30/2015.  We do not have the records or the details of the events during hospitalization, but the patient states he was not in ICU and did not have any end-organ damage.  He recovered after 2 weeks and has been doing okay until the past 2 months.  For the over 2 months he has been having abdominal discomfort and fullness, which is worse after eating, and he has been having intermittent night sweats, most recently yesterday, with no fevers or chills.  He reports a 10-pound weight and a progressively poor appetite.   He underwent EUS guided cystgastrostomy with placement of 15 mm Axios stent and interval imaging showed decrease in necrotic collection. He underwent 1 session of endoscopic necrosectomy wherein copious amount of purulent material was drained from his cavity. It was sent for culture and sensitivity which was positive for Candida kefyr  And was treated with fluconazole in addition to levaquin  He is overall asymptomatic except for leg swelling which has been deferred to his PCP  In particular he denies fevers, chills, night sweats. He denies further abdominal discomfort and fullness and is very appreciative of the care given to him     He denies nausea, vomiting, denies diarrhea or constipation.  Denies dysphagia and odynophagia.  Denies passage of dark-colored urine or jai-colored stools.        PAST MEDICAL HISTORY:  Hypertension, rosacea, colitis, fracture.        PAST SURGICAL HISTORY:  None.       MEDICATIONS:  See Epic.       ALLERGIES:  No known drug allergies.       SOCIAL HISTORY:  No history of smoking.  History of drinking in the past, quit 2-5 years back.  Never a heavy drinker according to wife, but has been arrested for driving under the influence as a teenager.  No history of IV drug abuse.       FAMILY HISTORY:  No history of pancreatitis in the family or pancreatic cancer in the family.       REVIEW OF SYSTEMS:  A complete 10-point review of systems was performed and noted to be negative except as above.       PHYSICAL EXAMINATION:   GENERAL:  He is not in acute distress.   HEAD AND NECK:  No pallor, no icterus.   ABDOMEN:  Soft, obese.   CENTRAL NERVOUS SYSTEM:  Alert, awake, oriented to time, place and person.   EXTREMITIES:  No pedal edema.         IMAGING:  CT scan dated 05/11/2017 shows heterogeneously hypoechoic oval mass measuring 13.1 x 9.7 x 9.3 cm within the pancreatic body.       ASSESSMENT AND PLAN:  This is a 56-year-old gentleman with history of hypertension, rosacea, colitis, episode of idiopathic pancreatitis leading to 2 weeks of hospitalization and now with new walled off peripancreatic necrotic collection, which is symptomatic and may be infected. He underwent EUS guided cystgastrostomy with placement o 15 mm Axios stent and underwent one session of endoscopic transluminal necrosectomy. Follow up CT shows near resolution of collection and both 10 Fr by 3 cm Solus stent appear to be well positioned  Currently asymptomatic and patient very pleased with his outcome         Our recommendations are as follows:    1. Discussed with patient that he will probably need his cystgastrostomy stents to left insitu for ever. He has a small residual collection which is expected to resolve with time and no further necrosectomy session is needed. Will repeat a CT scan of abdomen with IV contrast  in 4 months prior to clinic visit  2.  Explained to  patient that he has splenic vein thrombosis with collaterals. This can be attributed to his necrotizing pancreatitis. More than 40% recanalize spontaneously and there is no need for anti-coagulation especially in the absence of portal vein thrombosis  3. Explained to patient that cause for his pancreatitis is still unclear.   4. We will schedule a colonoscopy during next clinic visit.  5. Follow up clinic visit in 4-6 months with me with CT scan       A total of 25 minutes were spent with more than 50% of the time in counseling and formulating a management plan.

## 2017-07-13 NOTE — LETTER
7/13/2017       RE: Zackery Rogers  [address] [place] [facility] [address]     Dear Colleague,    Thank you for referring your patient, Zackery Rogers, to the [facility] at [facility]. Please see a copy of my visit note below.    REQUESTING PHYSICIAN:  Kalina Poe, APRN, CNP.       REASON FOR CONSULTATION:  Necrotizing pancreatitis.       CHIEF COMPLAINT:  Loss of weight and failure to thrive, night sweats.       HISTORY OF PRESENT ILLNESS:  This is a very pleasant 56-year-old gentleman with past medical history significant for hypertension, colitis and a previous episode of idiopathic acute pancreatitis in 2015, who was referred for a new peripancreatic collection on 5/18/17.  The patient reports that he was admitted for nearly 2 weeks at [facility] from 03/30/2015.  We do not have the records or the details of the events during hospitalization, but the patient states he was not in ICU and did not have any end-organ damage.  He recovered after 2 weeks and has been doing okay until the past 2 months.  For the over 2 months he has been having abdominal discomfort and fullness, which is worse after eating, and he has been having intermittent night sweats, most recently yesterday, with no fevers or chills.  He reports a 10-pound weight and a progressively poor appetite.   He underwent EUS guided cystgastrostomy with placement of 15 mm Axios stent and interval imaging showed decrease in necrotic collection. He underwent 1 session of endoscopic necrosectomy wherein copious amount of purulent material was drained from his cavity. It was sent for culture and sensitivity which was positive for Candida kefyr  And was treated with fluconazole in addition to levaquin  He is overall asymptomatic except for leg swelling which has been deferred to his PCP  In particular he denies fevers, chills, night sweats. He denies further abdominal discomfort and  fullness and is very appreciative of the care given to him     He denies nausea, vomiting, denies diarrhea or constipation.  Denies dysphagia and odynophagia.  Denies passage of dark-colored urine or jia-colored stools.        PAST MEDICAL HISTORY:  Hypertension, rosacea, colitis, fracture.       PAST SURGICAL HISTORY:  None.       MEDICATIONS:  See Epic.       ALLERGIES:  No known drug allergies.       SOCIAL HISTORY:  No history of smoking.  History of drinking in the past, quit 2-5 years back.  Never a heavy drinker according to wife, but has been arrested for driving under the influence as a teenager.  No history of IV drug abuse.       FAMILY HISTORY:  No history of pancreatitis in the family or pancreatic cancer in the family.       REVIEW OF SYSTEMS:  A complete 10-point review of systems was performed and noted to be negative except as above.       PHYSICAL EXAMINATION:   GENERAL:  He is not in acute distress.   HEAD AND NECK:  No pallor, no icterus.   ABDOMEN:  Soft, obese.   CENTRAL NERVOUS SYSTEM:  Alert, awake, oriented to time, place and person.   EXTREMITIES:  No pedal edema.         IMAGING:  CT scan dated 05/11/2017 shows heterogeneously hypoechoic oval mass measuring 13.1 x 9.7 x 9.3 cm within the pancreatic body.       ASSESSMENT AND PLAN:  This is a 56-year-old gentleman with history of hypertension, rosacea, colitis, episode of idiopathic pancreatitis leading to 2 weeks of hospitalization and now with new walled off peripancreatic necrotic collection, which is symptomatic and may be infected. He underwent EUS guided cystgastrostomy with placement o 15 mm Axios stent and underwent one session of endoscopic transluminal necrosectomy. Follow up CT shows near resolution of collection and both 10 Fr by 3 cm Solus stent appear to be well positioned  Currently asymptomatic and patient very pleased with his outcome     Our recommendations are as follows:    1. Discussed with patient that he will  probably need his cystgastrostomy stents to left insitu for ever. He has a small residual collection which is expected to resolve with time and no further necrosectomy session is needed. Will repeat a CT scan of abdomen with IV contrast  in 4 months prior to clinic visit  2.  Explained to patient that he has splenic vein thrombosis with collaterals. This can be attributed to his necrotizing pancreatitis. More than 40% recanalize spontaneously and there is no need for anti-coagulation especially in the absence of portal vein thrombosis  3. Explained to patient that cause for his pancreatitis is still unclear.   4. We will schedule a colonoscopy during next clinic visit.  5. Follow up clinic visit in 4-6 months with me with CT scan       A total of 25 minutes were spent with more than 50% of the time in counseling and formulating a management plan.     Again, thank you for allowing me to participate in the care of your patient.      Sincerely,    Guru Melanie MD

## 2017-07-14 ENCOUNTER — CARE COORDINATION (OUTPATIENT)
Dept: GASTROENTEROLOGY | Facility: CLINIC | Age: 57
End: 2017-07-14

## 2017-07-14 DIAGNOSIS — K85.02 IDIOPATHIC ACUTE PANCREATITIS WITH INFECTED NECROSIS: Primary | ICD-10-CM

## 2017-07-20 ENCOUNTER — TELEPHONE (OUTPATIENT)
Dept: FAMILY MEDICINE | Facility: OTHER | Age: 57
End: 2017-07-20

## 2017-07-20 NOTE — TELEPHONE ENCOUNTER
Summary:    Patient is due/failing the following:   COLONOSCOPY    Action needed:   Schedule a colonoscopy or complete a FIT test     Type of outreach:    Phone, left message for patient to call back.     Questions for provider review:    None                                                                                                                                    Angelica Juarez       Chart routed to Care Team .      Panel Management Review      Patient has the following on his problem list: None      Composite cancer screening  Chart review shows that this patient is due/due soon for the following Colonoscopy

## 2017-07-20 NOTE — LETTER
[facility]  [address]   [place] [address]  Phone: [phone]  July 31, 2017      Zackery Rogers  [address] [facility] [address]      Dear Zackery,    We care about your health and have reviewed your health plan including your medical conditions, medications, and lab results.  Based on this review, it is recommended that you follow up regarding the following health topic(s):  -Colon Cancer Screening    We recommend you take the following action(s):  -schedule a COLONOSCOPY to look for colon cancer (due every 10 years or 5 years in higher risk situations.)  Colonoscopies can prevent 90-95% of colon cancer deaths.  Problem lesions can be removed before they ever become cancer.  If you do not wish to do a colonoscopy or cannot afford to do one at this time, there is another option called a Fecal Immunochemical Occult Blood Test (FIT) a take home stool sample kit.  It does not replace the colonoscopy for colorectal cancer screening, but it can detect hidden bleeding in the lower colon.  It does need to be repeated every year and if a positive result is obtained, you would be referred for a colonoscopy.  If you have completed either one of these tests at another facility, please have the records sent to our clinic for our records.     Please call us at the [facility] - [phone] (or use [employer]) to address the above recommendations.     Thank you for trusting [facility] and we appreciate the opportunity to serve you.  We look forward to supporting your healthcare needs in the future.    Healthy Regards,    Your Health Care Team  [facility]

## 2017-07-29 ENCOUNTER — CARE COORDINATION (OUTPATIENT)
Dept: GASTROENTEROLOGY | Facility: CLINIC | Age: 57
End: 2017-07-29

## 2017-09-01 ENCOUNTER — TELEPHONE (OUTPATIENT)
Dept: GASTROENTEROLOGY | Facility: CLINIC | Age: 57
End: 2017-09-01

## 2017-09-01 NOTE — TELEPHONE ENCOUNTER
"Pt calling regarding recent symptoms. States that he has had an EGD in the past with Dr. Earl due to an infection. At the time he had the infection he was experiencing \"body sweats\" and pt states he was told to call if these sweats ever reoccurred. He has been having sweats again for the past couple of days. Additionally, pt states that a fungal infection was found at the time of surgery and he was prescribed a medication for this which he was never able to . He is wondering if the infection has flared again because he didn't take this medication. He would like a call with Dr. Earl's recommendations. Tali KEE LPN    "

## 2017-12-08 ENCOUNTER — HOSPITAL ENCOUNTER (OUTPATIENT)
Dept: CT IMAGING | Facility: CLINIC | Age: 57
Discharge: HOME | End: 2017-12-08
Attending: STUDENT IN AN ORGANIZED HEALTH CARE EDUCATION/TRAINING PROGRAM | Admitting: STUDENT IN AN ORGANIZED HEALTH CARE EDUCATION/TRAINING PROGRAM
Payer: COMMERCIAL

## 2017-12-08 DIAGNOSIS — K85.02 IDIOPATHIC ACUTE PANCREATITIS WITH INFECTED NECROSIS: ICD-10-CM

## 2017-12-08 PROCEDURE — 74177 CT ABD & PELVIS W/CONTRAST: CPT

## 2017-12-08 PROCEDURE — 25000128 H RX IP 250 OP 636: Performed by: RADIOLOGY

## 2017-12-08 PROCEDURE — 25000125 ZZHC RX 250: Performed by: RADIOLOGY

## 2017-12-08 RX ORDER — IOPAMIDOL 755 MG/ML
500 INJECTION, SOLUTION INTRAVASCULAR ONCE
Status: COMPLETED | OUTPATIENT
Start: 2017-12-08 | End: 2017-12-08

## 2017-12-08 RX ADMIN — SODIUM CHLORIDE 60 ML: 9 INJECTION, SOLUTION INTRAVENOUS at 08:17

## 2017-12-08 RX ADMIN — IOPAMIDOL 100 ML: 755 INJECTION, SOLUTION INTRAVENOUS at 08:17

## 2017-12-29 ENCOUNTER — TELEPHONE (OUTPATIENT)
Dept: FAMILY MEDICINE | Facility: OTHER | Age: 57
End: 2017-12-29

## 2017-12-29 NOTE — LETTER
[facility]  [address] [place] [address]  Phone: [phone]  January 26, 2018      Zackery Rogers  [address] [facility] [address]      Dear Zackery,    We care about your health and have reviewed your health plan including your medical conditions, medications, and lab results.  Based on this review, it is recommended that you follow up regarding the following health topic(s):  -Colon Cancer Screening    We recommend you take the following action(s):  -schedule a COLONOSCOPY to look for colon cancer (due every 10 years or 5 years in higher risk situations.)  Colonoscopies can prevent 90-95% of colon cancer deaths.  Problem lesions can be removed before they ever become cancer.  If you do not wish to do a colonoscopy or cannot afford to do one at this time, there is another option called a Fecal Immunochemical Occult Blood Test (FIT) a take home stool sample kit.  It does not replace the colonoscopy for colorectal cancer screening, but it can detect hidden bleeding in the lower colon.  It does need to be repeated every year and if a positive result is obtained, you would be referred for a colonoscopy.  If you have completed either one of these tests at another facility, please have the records sent to our clinic for our records.     Please call us at the [facility] - [phone] (or use [employer]) to address the above recommendations.     Thank you for trusting [facility] and we appreciate the opportunity to serve you.  We look forward to supporting your healthcare needs in the future.    Healthy Regards,    Your Health Care Team  [facility]

## 2018-05-24 ENCOUNTER — TELEPHONE (OUTPATIENT)
Dept: FAMILY MEDICINE | Facility: OTHER | Age: 58
End: 2018-05-24

## 2018-05-24 NOTE — LETTER
[facility]  [address] [place] [address]  Phone: [phone]  May 25, 2018      Zackery Rogers  [address] [facility] [address]      Dear Zackery,    We care about your health and have reviewed your health plan including your medical conditions, medications, and lab results.  Based on this review, it is recommended that you follow up regarding the following health topic(s):  -Colon Cancer Screening    We recommend you take the following action(s):  -schedule a COLONOSCOPY to look for colon cancer (due every 10 years or 5 years in higher risk situations.)  Colonoscopies can prevent 90-95% of colon cancer deaths.  Problem lesions can be removed before they ever become cancer.  If you do not wish to do a colonoscopy or cannot afford to do one at this time, there is another option called a Fecal Immunochemical Occult Blood Test (FIT) a take home stool sample kit.  It does not replace the colonoscopy for colorectal cancer screening, but it can detect hidden bleeding in the lower colon.  It does need to be repeated every year and if a positive result is obtained, you would be referred for a colonoscopy.  If you have completed either one of these tests at another facility, please have the records sent to our clinic for our records.     Please call us at the [facility] - [phone] (or use [employer]) to address the above recommendations.     Thank you for trusting [facility] and we appreciate the opportunity to serve you.  We look forward to supporting your healthcare needs in the future.    Healthy Regards,    Your Health Care Team  [facility]

## 2018-06-11 ENCOUNTER — TELEPHONE (OUTPATIENT)
Dept: GASTROENTEROLOGY | Facility: CLINIC | Age: 58
End: 2018-06-11

## 2018-06-11 NOTE — TELEPHONE ENCOUNTER
[facility] Call Center    Phone Message    May a detailed message be left on voicemail: yes    Reason for Call: Other: Patient's wife states he has been experiencing pressure where is pancreas is and has not been feeling very well. Kelsey is requesting a call to discuss this more in depth.     Action Taken: Message routed to:  [facility]

## 2018-06-11 NOTE — TELEPHONE ENCOUNTER
Called and left message for Zackery to call triage line, number given. Will also call in the am to see how he is doing.     Annmarie LAO RN Coordinator  Dr. Oscar, Dr. Hinton & Dr. Navarro   [facility]  [phone]

## 2018-06-14 ENCOUNTER — CARE COORDINATION (OUTPATIENT)
Dept: GASTROENTEROLOGY | Facility: CLINIC | Age: 58
End: 2018-06-14

## 2018-06-14 NOTE — PROGRESS NOTES
Called Gustavo again and he feels much better since his wife left a message for us. He does not have anymore symptoms and feels good.     He was wondering of about his CT scan that he had done in December and states that he never heard anything about the results.  Advised will send message to Dr. Hale and he can review and we will get back to him with response.   Amenable to plan and verbalized understanding.     Annmarie LAO RN Coordinator  Dr. Oscar, Dr. Hinton & Dr. Navarro   [facility]  [phone]

## 2018-09-04 ENCOUNTER — OFFICE VISIT (OUTPATIENT)
Dept: FAMILY MEDICINE | Facility: OTHER | Age: 58
End: 2018-09-04
Payer: COMMERCIAL

## 2018-09-04 VITALS
DIASTOLIC BLOOD PRESSURE: 96 MMHG | SYSTOLIC BLOOD PRESSURE: 134 MMHG | HEIGHT: 71 IN | BODY MASS INDEX: 31.67 KG/M2 | RESPIRATION RATE: 14 BRPM | HEART RATE: 72 BPM | WEIGHT: 226.2 LBS | TEMPERATURE: 98.2 F

## 2018-09-04 DIAGNOSIS — E66.09 CLASS 1 OBESITY DUE TO EXCESS CALORIES WITH SERIOUS COMORBIDITY AND BODY MASS INDEX (BMI) OF 31.0 TO 31.9 IN ADULT: ICD-10-CM

## 2018-09-04 DIAGNOSIS — Z86.0100 HISTORY OF COLONIC POLYPS: ICD-10-CM

## 2018-09-04 DIAGNOSIS — L71.9 ROSACEA: ICD-10-CM

## 2018-09-04 DIAGNOSIS — I10 BENIGN ESSENTIAL HYPERTENSION: Primary | ICD-10-CM

## 2018-09-04 DIAGNOSIS — E66.811 CLASS 1 OBESITY DUE TO EXCESS CALORIES WITH SERIOUS COMORBIDITY AND BODY MASS INDEX (BMI) OF 31.0 TO 31.9 IN ADULT: ICD-10-CM

## 2018-09-04 DIAGNOSIS — Z12.11 SPECIAL SCREENING FOR MALIGNANT NEOPLASMS, COLON: ICD-10-CM

## 2018-09-04 PROCEDURE — 99214 OFFICE O/P EST MOD 30 MIN: CPT | Performed by: STUDENT IN AN ORGANIZED HEALTH CARE EDUCATION/TRAINING PROGRAM

## 2018-09-04 RX ORDER — LISINOPRIL AND HYDROCHLOROTHIAZIDE 12.5; 2 MG/1; MG/1
2 TABLET ORAL DAILY
Qty: 60 TABLET | Refills: 0 | Status: SHIPPED | OUTPATIENT
Start: 2018-09-04 | End: 2018-11-07 | Stop reason: ALTCHOICE

## 2018-09-04 RX ORDER — MINOCYCLINE HYDROCHLORIDE 100 MG/1
100 CAPSULE ORAL 2 TIMES DAILY
Qty: 180 CAPSULE | Refills: 3 | Status: SHIPPED | OUTPATIENT
Start: 2018-09-04 | End: 2023-08-22

## 2018-09-04 NOTE — MR AVS SNAPSHOT
After Visit Summary   9/4/2018    Zackery Rogers    MRN: [id number]           Patient Information     Date Of Birth          1960        Visit Information        Provider Department      9/4/2018 11:20 AM Kalina Poe APRN CNP [facility]        Today's Diagnoses     Benign essential hypertension    -  1    Rosacea        Special screening for malignant neoplasms, colon          Care Instructions    Start lisinopril-hydrochlorothiazide combination pill for blood pressure two tablets daily.    Colonoscopy - You will receive a call to set up an appointment     Refills of minocycline sent.    DOMINIK Mendez            Follow-ups after your visit        Additional Services     GASTROENTEROLOGY ADULT REF PROCEDURE ONLY [facility] [phone]       Last Lab Result: Creatinine (mg/dL)       Date                     Value                 05/19/2017               0.93             ----------  Body mass index is 31.67 kg/(m^2).     Needed:  No  Language:  English    Patient will be contacted to schedule procedure.     Please be aware that coverage of these services is subject to the terms and limitations of your health insurance plan.  Call member services at your health plan with any benefit or coverage questions.  Any procedures must be performed at a [facility] facility OR coordinated by your clinic's referral office.    Please bring the following with you to your appointment:    (1) Any X-Rays, CTs or MRIs which have been performed.  Contact the facility where they were done to arrange for  prior to your scheduled appointment.    (2) List of current medications   (3) This referral request   (4) Any documents/labs given to you for this referral                  Who to contact     If you have questions or need follow up information about today's clinic visit or your schedule please contact [facility] directly at  "[phone].  Normal or non-critical lab and imaging results will be communicated to you by [employer], letter or phone within 4 business days after the clinic has received the results. If you do not hear from us within 7 days, please contact the clinic through [employer] or phone. If you have a critical or abnormal lab result, we will notify you by phone as soon as possible.  Submit refill requests through [email or web address] or call your pharmacy and they will forward the refill request to us. Please allow 3 business days for your refill to be completed.          Additional Information About Your Visit        Bloom[employer] Information     [email or web address] gives you secure access to your electronic health record. If you see a primary care provider, you can also send messages to your care team and make appointments. If you have questions, please call your primary care clinic.  If you do not have a primary care provider, please call [phone] and they will assist you.        Care EveryWhere ID     This is your Care EveryWhere ID. This could be used by other organizations to access your [place] medical records  [id number]        Your Vitals Were     Pulse Temperature Respirations Height BMI (Body Mass Index)       72 98.2  F (36.8  C) (Temporal) 14 5' 10.87\" (1.8 m) 31.67 kg/m2        Blood Pressure from Last 3 Encounters:   09/04/18 164/88   07/13/17 119/81   06/05/17 (!) 129/91    Weight from Last 3 Encounters:   09/04/18 226 lb 3.2 oz (102.6 kg)   07/13/17 218 lb 3.2 oz (99 kg)   06/05/17 211 lb 13.8 oz (96.1 kg)              We Performed the Following     GASTROENTEROLOGY ADULT REF PROCEDURE ONLY [facility] [phone]          Today's Medication Changes          These changes are accurate as of 9/4/18 12:16 PM.  If you have any questions, ask your nurse or doctor.               Start taking these medicines.        Dose/Directions    lisinopril-hydrochlorothiazide 20-12.5 MG per tablet   Commonly known as:  PRINZIDE/ZESTORETIC "   Used for:  Benign essential hypertension   Started by:  Kalina Poe APRN CNP        Dose:  2 tablet   Take 2 tablets by mouth daily   Quantity:  60 tablet   Refills:  0       minocycline 100 MG capsule   Commonly known as:  MINOCIN/DYNACIN   Used for:  Rosacea   Started by:  Kalina Poe APRN CNP        Dose:  100 mg   Take 1 capsule (100 mg) by mouth 2 times daily   Quantity:  180 capsule   Refills:  3            Where to get your medicines      These medications were [facility] to [id number] [place] [facility] [address] [place] [address] [address]  [address] [place] [address]     Phone:  [phone]     lisinopril-hydrochlorothiazide 20-12.5 MG per tablet    minocycline 100 MG capsule                Primary Care Provider Office Phone # Fax #    DINH Saravia [phone] [address] [facility] [address]        Equal Access to Services     [facility]: Hadii aad ku hadasho Soomaali, waaxda luqadaha, qaybta kaalmada adeegyada, waxay idiin haytiffanyn sofia ruby . So [facility] [phone].    ATENCIÓN: Si habla español, tiene a sen disposición servicios gratuitos de asistencia lingüística. Llame al [phone].    We comply with applicable federal civil rights laws and [place] laws. We do not discriminate on the basis of race, color, national origin, age, disability, sex, sexual orientation, or gender identity.            Thank you!     Thank you for choosing [facility]  for your care. Our goal is always to provide you with excellent care. Hearing back from our patients is one way we can continue to improve our services. Please take a few minutes to complete the written survey that you may receive in the mail after your visit with us. Thank you!             Your Updated Medication List - Protect others around you: Learn how to safely use, store and throw away your medicines at www.disposemymeds.org.          This list is  accurate as of 9/4/18 12:16 PM.  Always use your most recent med list.                   Brand Name Dispense Instructions for use Diagnosis    DOXYCYCLINE HYCLATE PO      Take 100 mg by mouth        fluconazole 200 MG tablet    DIFLUCAN    10 tablet    Take 1 tablet (200 mg) by mouth daily    Fungal infection       * levofloxacin 500 MG tablet    LEVAQUIN    7 tablet    Take 1 tablet (500 mg) by mouth daily    Pancreatitis, necrotizing       * levofloxacin 500 MG tablet    LEVAQUIN    7 tablet    Take 1 tablet (500 mg) by mouth daily    Necrotizing pancreatitis       lisinopril 40 MG tablet    PRINIVIL/ZESTRIL     Take 40 mg by mouth        lisinopril-hydrochlorothiazide 20-12.5 MG per tablet    PRINZIDE/ZESTORETIC    60 tablet    Take 2 tablets by mouth daily    Benign essential hypertension       minocycline 100 MG capsule    MINOCIN/DYNACIN    180 capsule    Take 1 capsule (100 mg) by mouth 2 times daily    Rosacea       * Notice:  This list has 2 medication(s) that are the same as other medications prescribed for you. Read the directions carefully, and ask your doctor or other care provider to review them with you.

## 2018-09-04 NOTE — PROGRESS NOTES
"  SUBJECTIVE:   Zackery Rogers is a 57 year old male who presents to clinic today for the following health issues:    HPI    Hypertension Follow-up      Outpatient blood pressures are not being checked.    Low Salt Diet: not monitoring salt    He is not always good about taking his medication every day. He did take it this morning. He has gained some weight as he has been less active. He knows he needs to eat better and be more active.     Rosacea - takes minocycline 100 mg twice daily year-round. If he stops taking the medication his symptoms flare. He is tolerating the medication well and without side effects.     History of colon polyps - had a colonoscopy many years ago and was told this. He thinks he is due for another colonoscopy.    Problem list and histories reviewed & adjusted, as indicated.  Additional history: as documented    BP Readings from Last 3 Encounters:   09/04/18 (!) 134/96   07/13/17 119/81   06/05/17 (!) 129/91    Wt Readings from Last 3 Encounters:   09/04/18 226 lb 3.2 oz (102.6 kg)   07/13/17 218 lb 3.2 oz (99 kg)   06/05/17 211 lb 13.8 oz (96.1 kg)                  Labs reviewed in EPIC    ROS:  Constitutional, HEENT, cardiovascular, pulmonary, gi and gu systems are negative, except as otherwise noted.    OBJECTIVE:     BP (!) 134/96  Pulse 72  Temp 98.2  F (36.8  C) (Temporal)  Resp 14  Ht 5' 10.87\" (1.8 m)  Wt 226 lb 3.2 oz (102.6 kg)  BMI 31.67 kg/m2  Body mass index is 31.67 kg/(m^2).  GENERAL: healthy, alert and no distress  NECK: no adenopathy, no asymmetry, masses, or scars and thyroid normal to palpation  RESP: lungs clear to auscultation - no rales, rhonchi or wheezes  CV: regular rate and rhythm, normal S1 S2, no S3 or S4, no murmur, click or rub, no peripheral edema  MS: no gross musculoskeletal defects noted, no edema  SKIN: mild pinkish discoloration of nose and surrounding skin  NEURO: Normal strength and tone, mentation intact and speech normal  PSYCH: mentation " appears normal, affect normal/bright    Diagnostic Test Results:  none     ASSESSMENT/PLAN:     1. Benign essential hypertension  Repeated blood pressures x 3 here in clinic and not at goal. Will add hydrochlorothiazide and have patient return to clinic for BP check in 3 weeks with MA or pharmacist. Reviewed possible side effects and to call if any concerns.   - lisinopril-hydrochlorothiazide (PRINZIDE/ZESTORETIC) 20-12.5 MG per tablet; Take 2 tablets by mouth daily  Dispense: 60 tablet; Refill: 0    2. Rosacea  Stable. Refills sent.   - minocycline (MINOCIN/DYNACIN) 100 MG capsule; Take 1 capsule (100 mg) by mouth 2 times daily  Dispense: 180 capsule; Refill: 3    3. Special screening for malignant neoplasms, colon  4. History of colonic polyps  Patient has history of colon polyps so I recommended colonoscopy as best option for colon cancer screening. Patient is agreeable to this.  - GASTROENTEROLOGY ADULT REF PROCEDURE ONLY [facility] [phone]    5. Class 1 obesity due to excess calories with serious comorbidity and body mass index (BMI) of 31.0 to 31.9 in adult  Recommended working on eating healthier and losing weight as this is likely causing increase in blood pressure.    DINH Somers [facility]

## 2018-09-05 ENCOUNTER — TELEPHONE (OUTPATIENT)
Dept: FAMILY MEDICINE | Facility: OTHER | Age: 58
End: 2018-09-05

## 2018-09-05 NOTE — TELEPHONE ENCOUNTER
Left message for patient to return call to schedule colonoscopy or EGD. If Shanna or Jovita are unavailable, please transfer to the surgery center.

## 2018-09-12 ENCOUNTER — TELEPHONE (OUTPATIENT)
Dept: FAMILY MEDICINE | Facility: OTHER | Age: 58
End: 2018-09-12

## 2018-09-12 NOTE — TELEPHONE ENCOUNTER
Date of colonoscopy/EGD: 10/1/18  Surgeon: Dr. Ridley  Prep:Miralax  Packet:Colonoscopy/EGD instructions mailed to patient's home address.   Date: 9/12/2018      Surgery Scheduler

## 2018-09-17 ENCOUNTER — NURSE TRIAGE (OUTPATIENT)
Dept: NURSING | Facility: CLINIC | Age: 58
End: 2018-09-17

## 2018-09-17 ENCOUNTER — TELEPHONE (OUTPATIENT)
Dept: FAMILY MEDICINE | Facility: OTHER | Age: 58
End: 2018-09-17

## 2018-09-17 DIAGNOSIS — I10 BENIGN ESSENTIAL HYPERTENSION: ICD-10-CM

## 2018-09-17 NOTE — TELEPHONE ENCOUNTER
Clinic Action Needed:No, FYI only    Reason for Call:  Patient calling reporting he started on new blood pressure medication in the past 3 weeks. Reporting improvement in blood pressure readings 113/89 taken at pharmacy.   Patient stating he has history of macular degeneration reporting improvement in vision, stating he almost does not need his glasses.   Patient will go into Opthalmology for a recheck.   Patient contact phone .     Routed to:Oma Berry RN  [facility]

## 2018-09-17 NOTE — TELEPHONE ENCOUNTER
Clinic Action Needed:No, FYI only    Reason for Call:  Patient calling reporting he started on new blood pressure medication in the past 3 weeks. Reporting improvement in blood pressure readings 113/89 taken at pharmacy.   Patient stating he has history of macular degeneration reporting improvement in vision, stating he almost does not need his glasses.   Patient will go into Opthalmology for a recheck.   Patient contact phone .     Routed to:Oma Berry RN  [facility]

## 2018-09-19 NOTE — TELEPHONE ENCOUNTER
Spoke to patient and relayed message below. Patient expressed understanding. Patient states since taking the eye vitamin and blood pressure pills, his vision is significantly better and wondering if blood pressure would have anything to do with vision? Patient states he had an eye exam a few days ago and was told to get another eye exam in 2 weeks before new glasses can be issued.

## 2018-09-19 NOTE — TELEPHONE ENCOUNTER
Called and talked to patient. His vision has improved by 50% per eye doctor after BP improved. He will stop by to get BP checked tomorrow.  PITER MendezC

## 2018-09-19 NOTE — TELEPHONE ENCOUNTER
Please call patient to let him know I am so happy to hear his BP is doing so well. See if he can stop by a [facility] pharmacy to have the BP checked and recorded in our system. I apologize for this extra step but if we do not get it recorded in our system it will continue to trigger that his BP is not yet controlled. After he does this I will send in refills on the BP medication. Tell him thanks from me.  Kalina Poe, MAIK-C

## 2018-09-20 ENCOUNTER — ALLIED HEALTH/NURSE VISIT (OUTPATIENT)
Dept: FAMILY MEDICINE | Facility: OTHER | Age: 58
End: 2018-09-20
Payer: COMMERCIAL

## 2018-09-20 VITALS — SYSTOLIC BLOOD PRESSURE: 110 MMHG | DIASTOLIC BLOOD PRESSURE: 70 MMHG

## 2018-09-20 DIAGNOSIS — I10 BENIGN ESSENTIAL HYPERTENSION: Primary | ICD-10-CM

## 2018-09-20 PROCEDURE — 99207 ZZC NO CHARGE NURSE ONLY: CPT | Performed by: STUDENT IN AN ORGANIZED HEALTH CARE EDUCATION/TRAINING PROGRAM

## 2018-09-20 NOTE — MR AVS SNAPSHOT
After Visit Summary   9/20/2018    Zackery Rogers    MRN: [id number]           Patient Information     Date Of Birth          1960        Visit Information        Provider Department      9/20/2018 2:57 PM Kalina Poe APRN CNP [facility]        Today's Diagnoses     Benign essential hypertension    -  1       Follow-ups after your visit        Your next 10 appointments already scheduled     Oct 01, 2018   Procedure with Rajinder Ridley MD   [facility] ([facility])    [address] [place] [id number] [phone]              Who to contact     If you have questions or need follow up information about today's clinic visit or your schedule please contact [facility] directly at [phone].  Normal or non-critical lab and imaging results will be communicated to you by MyChart, letter or phone within 4 business days after the clinic has received the results. If you do not hear from us within 7 days, please contact the clinic through MyChart or phone. If you have a critical or abnormal lab result, we will notify you by phone as soon as possible.  Submit refill requests through [employer] or call your pharmacy and they will forward the refill request to us. Please allow 3 business days for your refill to be completed.          Additional Information About Your Visit        MyChart Information     [employer] gives you secure access to your electronic health record. If you see a primary care provider, you can also send messages to your care team and make appointments. If you have questions, please call your primary care clinic.  If you do not have a primary care provider, please call [phone] and they will assist you.        Care EveryWhere ID     This is your Care EveryWhere ID. This could be used by other organizations to access your [place] medical records  [id number]         Blood Pressure from  Last 3 Encounters:   09/20/18 110/70   09/04/18 (!) 134/96   07/13/17 119/81    Weight from Last 3 Encounters:   09/04/18 226 lb 3.2 oz (102.6 kg)   07/13/17 218 lb 3.2 oz (99 kg)   06/05/17 211 lb 13.8 oz (96.1 kg)              Today, you had the following     No orders found for display       Primary Care Provider Office Phone # Fax Amilcar Tse DINH Poe [facility] [phone] [address] [facility] [address]        Equal Access to Services     [facility] : Hadii aad layton hadasho Soearnestine, waaxda luqadaha, qaybta kaalmada adesteveyada, bina ruby . So [facility] [phone].    ATENCIÓN: Si habla español, tiene a sen disposición servicios gratuitos de asistencia lingüística. Llame al [phone].    We comply with applicable federal civil rights laws and [place] laws. We do not discriminate on the basis of race, color, national origin, age, disability, sex, sexual orientation, or gender identity.            Thank you!     Thank you for choosing [facility]  for your care. Our goal is always to provide you with excellent care. Hearing back from our patients is one way we can continue to improve our services. Please take a few minutes to complete the written survey that you may receive in the mail after your visit with us. Thank you!             Your Updated Medication List - Protect others around you: Learn how to safely use, store and throw away your medicines at www.disposemymeds.org.          This list is accurate as of 9/20/18  3:08 PM.  Always use your most recent med list.                   Brand Name Dispense Instructions for use Diagnosis    lisinopril-hydrochlorothiazide 20-12.5 MG per tablet    PRINZIDE/ZESTORETIC    60 tablet    Take 2 tablets by mouth daily    Benign essential hypertension       minocycline 100 MG capsule    MINOCIN/DYNACIN    180 capsule    Take 1 capsule (100 mg) by mouth 2 times daily    Rosacea

## 2018-09-20 NOTE — PROGRESS NOTES
"Zackery Rogers is enrolled/participating in the retail pharmacy Blood Pressure Goals Achievement Program (BPGAP).  Zackery Rogers was evaluated at [facility] on September 20, 2018 at which time his blood pressure was:    BP Readings from Last 3 Encounters:   09/20/18 110/70   09/04/18 (!) 134/96   07/13/17 119/81       Zackery Rogers is not experiencing symptoms. Patient feels \"great\" after a couple of weeks on a new blood pressure medication.    Follow-Up: BP is at goal of < 140/90mmHg (patient 18+ years of age with or without diabetes).  Recommended follow-up at pharmacy in 6 months.     Recommendation to Provider:  none    This note completed by:     Gadiel Wong PharmD  [facility]   [phone]    "

## 2018-10-01 ENCOUNTER — ANESTHESIA (OUTPATIENT)
Dept: GASTROENTEROLOGY | Facility: CLINIC | Age: 58
End: 2018-10-01
Payer: COMMERCIAL

## 2018-10-01 ENCOUNTER — SURGERY (OUTPATIENT)
Age: 58
End: 2018-10-01
Payer: COMMERCIAL

## 2018-10-01 ENCOUNTER — ANESTHESIA EVENT (OUTPATIENT)
Dept: GASTROENTEROLOGY | Facility: CLINIC | Age: 58
End: 2018-10-01
Payer: COMMERCIAL

## 2018-10-01 ENCOUNTER — HOSPITAL ENCOUNTER (OUTPATIENT)
Facility: CLINIC | Age: 58
Discharge: HOME | End: 2018-10-01
Attending: FAMILY MEDICINE | Admitting: FAMILY MEDICINE
Payer: COMMERCIAL

## 2018-10-01 VITALS
WEIGHT: 226.2 LBS | RESPIRATION RATE: 16 BRPM | HEIGHT: 71 IN | OXYGEN SATURATION: 96 % | DIASTOLIC BLOOD PRESSURE: 94 MMHG | BODY MASS INDEX: 31.67 KG/M2 | SYSTOLIC BLOOD PRESSURE: 116 MMHG

## 2018-10-01 LAB — COLONOSCOPY: NORMAL

## 2018-10-01 PROCEDURE — 25000125 ZZHC RX 250: Performed by: NURSE ANESTHETIST, CERTIFIED REGISTERED

## 2018-10-01 PROCEDURE — G0105 COLORECTAL SCRN; HI RISK IND: HCPCS | Performed by: FAMILY MEDICINE

## 2018-10-01 PROCEDURE — 25000128 H RX IP 250 OP 636: Performed by: FAMILY MEDICINE

## 2018-10-01 PROCEDURE — 25000132 ZZH RX MED GY IP 250 OP 250 PS 637: Performed by: FAMILY MEDICINE

## 2018-10-01 PROCEDURE — 37000008 ZZH ANESTHESIA TECHNICAL FEE, 1ST 30 MIN: Performed by: FAMILY MEDICINE

## 2018-10-01 PROCEDURE — 37000009 ZZH ANESTHESIA TECHNICAL FEE, EACH ADDTL 15 MIN: Performed by: FAMILY MEDICINE

## 2018-10-01 PROCEDURE — 25000128 H RX IP 250 OP 636: Performed by: NURSE ANESTHETIST, CERTIFIED REGISTERED

## 2018-10-01 PROCEDURE — 45378 DIAGNOSTIC COLONOSCOPY: CPT | Performed by: FAMILY MEDICINE

## 2018-10-01 PROCEDURE — 40000296 ZZH STATISTIC ENDO RECOVERY CLASS 1:2 FIRST HOUR: Performed by: FAMILY MEDICINE

## 2018-10-01 RX ORDER — PROPOFOL 10 MG/ML
INJECTION, EMULSION INTRAVENOUS PRN
Status: DISCONTINUED | OUTPATIENT
Start: 2018-10-01 | End: 2018-10-01

## 2018-10-01 RX ORDER — ONDANSETRON 2 MG/ML
4 INJECTION INTRAMUSCULAR; INTRAVENOUS EVERY 30 MIN PRN
Status: CANCELLED | OUTPATIENT
Start: 2018-10-01

## 2018-10-01 RX ORDER — LIDOCAINE HYDROCHLORIDE 20 MG/ML
INJECTION, SOLUTION INFILTRATION; PERINEURAL PRN
Status: DISCONTINUED | OUTPATIENT
Start: 2018-10-01 | End: 2018-10-01

## 2018-10-01 RX ORDER — ONDANSETRON 4 MG/1
4 TABLET, ORALLY DISINTEGRATING ORAL EVERY 30 MIN PRN
Status: CANCELLED | OUTPATIENT
Start: 2018-10-01

## 2018-10-01 RX ORDER — SODIUM CHLORIDE, SODIUM LACTATE, POTASSIUM CHLORIDE, CALCIUM CHLORIDE 600; 310; 30; 20 MG/100ML; MG/100ML; MG/100ML; MG/100ML
INJECTION, SOLUTION INTRAVENOUS CONTINUOUS
Status: DISCONTINUED | OUTPATIENT
Start: 2018-10-01 | End: 2018-10-01 | Stop reason: HOSPADM

## 2018-10-01 RX ORDER — MEPERIDINE HYDROCHLORIDE 25 MG/ML
12.5 INJECTION INTRAMUSCULAR; INTRAVENOUS; SUBCUTANEOUS
Status: CANCELLED | OUTPATIENT
Start: 2018-10-01

## 2018-10-01 RX ORDER — SODIUM CHLORIDE, SODIUM LACTATE, POTASSIUM CHLORIDE, CALCIUM CHLORIDE 600; 310; 30; 20 MG/100ML; MG/100ML; MG/100ML; MG/100ML
INJECTION, SOLUTION INTRAVENOUS CONTINUOUS
Status: CANCELLED | OUTPATIENT
Start: 2018-10-01

## 2018-10-01 RX ORDER — LIDOCAINE 40 MG/G
CREAM TOPICAL
Status: DISCONTINUED | OUTPATIENT
Start: 2018-10-01 | End: 2018-10-01 | Stop reason: HOSPADM

## 2018-10-01 RX ORDER — NALOXONE HYDROCHLORIDE 0.4 MG/ML
.1-.4 INJECTION, SOLUTION INTRAMUSCULAR; INTRAVENOUS; SUBCUTANEOUS
Status: CANCELLED | OUTPATIENT
Start: 2018-10-01 | End: 2018-10-02

## 2018-10-01 RX ORDER — ONDANSETRON 2 MG/ML
4 INJECTION INTRAMUSCULAR; INTRAVENOUS
Status: DISCONTINUED | OUTPATIENT
Start: 2018-10-01 | End: 2018-10-01 | Stop reason: HOSPADM

## 2018-10-01 RX ORDER — PROPOFOL 10 MG/ML
INJECTION, EMULSION INTRAVENOUS CONTINUOUS PRN
Status: DISCONTINUED | OUTPATIENT
Start: 2018-10-01 | End: 2018-10-01

## 2018-10-01 RX ADMIN — PROPOFOL 50 MG: 10 INJECTION, EMULSION INTRAVENOUS at 11:22

## 2018-10-01 RX ADMIN — SIMETHICONE 100 ML: 20 SUSPENSION/ DROPS ORAL at 11:10

## 2018-10-01 RX ADMIN — SODIUM CHLORIDE, POTASSIUM CHLORIDE, SODIUM LACTATE AND CALCIUM CHLORIDE: 600; 310; 30; 20 INJECTION, SOLUTION INTRAVENOUS at 09:57

## 2018-10-01 RX ADMIN — LIDOCAINE HYDROCHLORIDE 40 MG: 20 INJECTION, SOLUTION INFILTRATION; PERINEURAL at 11:22

## 2018-10-01 RX ADMIN — LIDOCAINE HYDROCHLORIDE 1 ML: 10 INJECTION, SOLUTION EPIDURAL; INFILTRATION; INTRACAUDAL; PERINEURAL at 09:57

## 2018-10-01 RX ADMIN — PROPOFOL 150 MCG/KG/MIN: 10 INJECTION, EMULSION INTRAVENOUS at 11:22

## 2018-10-01 NOTE — DISCHARGE INSTRUCTIONS
[facility]   Adult Discharge Orders & Instructions     For 24 hours after surgery    1. Get plenty of rest.  A responsible adult must stay with you for at least 24 hours after you leave the hospital.   2. Do not drive or use heavy equipment.  If you have weakness or tingling, don't drive or use heavy equipment until this feeling goes away.  3. Do not drink alcohol.  4. Avoid strenuous or risky activities.  Ask for help when climbing stairs.   5. You may feel lightheaded.  IF so, sit for a few minutes before standing.  Have someone help you get up.   6. If you have nausea (feel sick to your stomach): Drink only clear liquids such as apple juice, ginger ale, broth or 7-Up.  Rest may also help.  Be sure to drink enough fluids.  Move to a regular diet as you feel able.  7. You may have a slight fever. Call the doctor if your fever is over 100 F (37.7 C) (taken under the tongue) or lasts longer than 24 hours.  8. You may have a dry mouth, a sore throat, muscle aches or trouble sleeping.  These should go away after 24 hours.  9. Do not make important or legal decisions.   Call your doctor for any of the followin.  Signs of infection (fever, growing tenderness at the surgery site, a large amount of drainage or bleeding, severe pain, foul-smelling drainage, redness, swelling).    2. It has been over 8 to 10 hours since surgery and you are still not able to urinate (pass water).    3.  Headache for over 24 hours.    4.  Numbness, tingling or weakness the day after surgery (if you had spinal anesthesia).  To contact a doctor, call ________________________________________      [facility]    Home Care Following Endoscopy          Activity:    You have just undergone an endoscopic procedure usually performed with conscious sedation.  Do not work or operate machinery (including a car) for at least 12 hours.      I encourage you to walk and attempt to pass this air as soon as  possible.    Diet:    Return to the diet you were on before your procedure but eat lightly for the first 12-24 hours.    Drink plenty of water.    Resume any regular medications unless otherwise advised by your physician.  Please begin any new medication prescribed as a result of your procedure as directed by your physician.     If you had any biopsy or polyp removed please refrain from aspirin or aspirin products for 2 days.  If on Coumadin please restart as instructed by your physician.   Pain:    You may take Tylenol as needed for pain.  Expected Recovery:    You can expect some mild abdominal fullness and/or discomfort due to the air used to inflate your intestinal tract. It is also normal to have a mild sore throat after upper endoscopy.    Call Your Physician if You Have:    After Upper Endoscopy:  o Shoulder, back or chest pain.  o Difficulty breathing or swallowing.  o Vomiting blood.    After Colonoscopy:  o Worsening persisting abdominal pain which is worse with activity.  o Fevers (>101 degrees F), chills or shakes.  o Passage of continued blood with bowel movements.   Any questions or concerns about your recovery, please call [phone] or after hours [phone] Nurse Advice Line.    Follow-up Care:    You should receive a call or letter with your results within 1 week. Please call if you have not received a notification of your results.    If asked to return to clinic please make an appointment 1 week after your procedure.  Call [phone].

## 2018-10-01 NOTE — ANESTHESIA POSTPROCEDURE EVALUATION
Patient: Zackery Rogers    Procedure(s):  Colonoscopy - Wound Class: II-Clean Contaminated    Diagnosis:screening  Diagnosis Additional Information: No value filed.    Anesthesia Type:  MAC    Note:  Anesthesia Post Evaluation    Patient location during evaluation: Phase 2  Patient participation: Able to fully participate in evaluation  Level of consciousness: awake and alert  Pain management: adequate  Airway patency: patent  Cardiovascular status: blood pressure returned to baseline  Respiratory status: spontaneous ventilation and room air  Hydration status: acceptable  PONV: none     Anesthetic complications: None    Comments: Patient resting without complaint, he was pleased with his anesthetic today. No anesthesia concerns.         Last vitals:  Vitals:    10/01/18 0944 10/01/18 1147   BP: (!) 126/95 106/82   Resp: 16 16   SpO2: 97% 94%         Electronically Signed By: DINH Delarosa CRNA  October 1, 2018  11:59 AM

## 2018-10-01 NOTE — IP AVS SNAPSHOT
[facility]    [address] [facility] [address]    Phone:  [phone]                                       After Visit Summary   10/1/2018    Zackery Rogers    MRN: [id number]           After Visit Summary Signature Page     I have received my discharge instructions, and my questions have been answered. I have discussed any challenges I see with this plan with the nurse or doctor.    ..........................................................................................................................................  Patient/Patient Representative Signature      ..........................................................................................................................................  Patient Representative Print Name and Relationship to Patient    ..................................................               ................................................  Date                                   Time    ..........................................................................................................................................  Reviewed by Signature/Title    ...................................................              ..............................................  Date                                               Time          22EPIC Rev 08/18

## 2018-10-01 NOTE — H&P
"Pre-Endoscopy History and Physical     Zackery Rogers MRN# [id number]   YOB: 1960 Age: 57 year old     Date of Procedure: 10/1/2018  Primary care provider: Kalina Poe  Type of Endoscopy: colonoscopy  Type of Anesthesia Anticipated: MAC    HPI:    Zackery is a 57 year old male who will be undergoing the above procedure.      Zackery is feeling well today. Can get up a single flight of stairs without dyspnea. Estimated METS > 4.     Patient Active Problem List   Diagnosis     Advanced directives, counseling/discussion     Swelling of both ankles     Night sweats     History of colonic polyps     Benign essential hypertension     Rosacea     Obesity (BMI 30.0-34.9)     Class 1 obesity due to excess calories with serious comorbidity and body mass index (BMI) of 31.0 to 31.9 in adult        No prescriptions prior to admission.        REVIEW OF SYSTEMS:     5 point ROS negative except as noted above in HPI, including Gen., Resp., CV, GI &  system review.      PHYSICAL EXAM:   BP (!) 116/94  Resp 16  Ht 5' 10.87\" (1.8 m)  Wt 226 lb 3.2 oz (102.6 kg)  SpO2 96%  BMI 31.66 kg/m2   Estimated body mass index is 31.66 kg/(m^2) as calculated from the following:    Height as of this encounter: 5' 10.87\" (1.8 m).    Weight as of this encounter: 226 lb 3.2 oz (102.6 kg).    GENERAL APPEARANCE: alert and no distress  RESP: lungs clear and unlabored  CV: regular  ABD: soft, nt/nd    DIAGNOSTICS:    Not indicated      IMPRESSION   ASA Class 2 - Mild systemic disease        PLAN:     Plan for colonoscopy. No medical contraindications to proceed, or further work up needed. The risks and benefits of the procedure and the sedation options and risks were discussed with the patient. These include infection, bleeding, and small risk of colon perforation (1/1000 to 1/36066 depending on patient characteristics and type of procedure). Zackery was also explained to alternatives for colo-rectal screening. " All questions were answered and informed consent was obtained.      The above has been forwarded to the consulting provider.      Signed Electronically by: Rajinder Ridley  October 1, 2018

## 2018-10-01 NOTE — IP AVS SNAPSHOT
MRN:[id number]                      After Visit Summary   10/1/2018    Zackery Rogers    MRN: [id number]           Thank you!     Thank you for choosing [facility] for your care. Our goal is always to provide you with excellent care. Hearing back from our patients is one way we can continue to improve our services. Please take a few minutes to complete the written survey that you may receive in the mail after you visit with us. Thank you!        Patient Information     Date Of Birth          1960        About your hospital stay     You were admitted on:  October 1, 2018 You last received care in the:  [facility]    You were discharged on:  October 1, 2018       Who to Call     For medical emergencies, please call 911.  For non-urgent questions about your medical care, please call your primary care provider or clinic, [phone]  For questions related to your surgery, please call your surgery clinic        Attending Provider     Provider Rajinder Howard MD [facility]       Primary Care Provider Office Phone # Fax #    Kalina DINH Mo [facility] [phone]      Further instructions from your care team       [facility]   Adult Discharge Orders & Instructions     For 24 hours after surgery    1. Get plenty of rest.  A responsible adult must stay with you for at least 24 hours after you leave the hospital.   2. Do not drive or use heavy equipment.  If you have weakness or tingling, don't drive or use heavy equipment until this feeling goes away.  3. Do not drink alcohol.  4. Avoid strenuous or risky activities.  Ask for help when climbing stairs.   5. You may feel lightheaded.  IF so, sit for a few minutes before standing.  Have someone help you get up.   6. If you have nausea (feel sick to your stomach): Drink only clear liquids such as apple juice, ginger ale, broth or 7-Up.  Rest may also help.  Be sure to drink  enough fluids.  Move to a regular diet as you feel able.  7. You may have a slight fever. Call the doctor if your fever is over 100 F (37.7 C) (taken under the tongue) or lasts longer than 24 hours.  8. You may have a dry mouth, a sore throat, muscle aches or trouble sleeping.  These should go away after 24 hours.  9. Do not make important or legal decisions.   Call your doctor for any of the followin.  Signs of infection (fever, growing tenderness at the surgery site, a large amount of drainage or bleeding, severe pain, foul-smelling drainage, redness, swelling).    2. It has been over 8 to 10 hours since surgery and you are still not able to urinate (pass water).    3.  Headache for over 24 hours.    4.  Numbness, tingling or weakness the day after surgery (if you had spinal anesthesia).  To contact a doctor, call ________________________________________      [facility]    Home Care Following Endoscopy          Activity:    You have just undergone an endoscopic procedure usually performed with conscious sedation.  Do not work or operate machinery (including a car) for at least 12 hours.      I encourage you to walk and attempt to pass this air as soon as possible.    Diet:    Return to the diet you were on before your procedure but eat lightly for the first 12-24 hours.    Drink plenty of water.    Resume any regular medications unless otherwise advised by your physician.  Please begin any new medication prescribed as a result of your procedure as directed by your physician.     If you had any biopsy or polyp removed please refrain from aspirin or aspirin products for 2 days.  If on Coumadin please restart as instructed by your physician.   Pain:    You may take Tylenol as needed for pain.  Expected Recovery:    You can expect some mild abdominal fullness and/or discomfort due to the air used to inflate your intestinal tract. It is also normal to have a mild sore throat after upper  "endoscopy.    Call Your Physician if You Have:    After Upper Endoscopy:  o Shoulder, back or chest pain.  o Difficulty breathing or swallowing.  o Vomiting blood.    After Colonoscopy:  o Worsening persisting abdominal pain which is worse with activity.  o Fevers (>101 degrees F), chills or shakes.  o Passage of continued blood with bowel movements.   Any questions or concerns about your recovery, please call [phone] or after hours [phone] Nurse Advice Line.    Follow-up Care:    You should receive a call or letter with your results within 1 week. Please call if you have not received a notification of your results.    If asked to return to clinic please make an appointment 1 week after your procedure.  Call [phone].       Pending Results     No orders found from 9/29/2018 to 10/2/2018.            Admission Information     Date & Time Provider Department Dept. Phone    10/1/2018 Rajinder Ridley MD [facility] [phone]      Your Vitals Were     Blood Pressure Respirations Height Weight Pulse Oximetry BMI (Body Mass Index)    106/82 16 1.8 m (5' 10.87\") 102.6 kg (226 lb 3.2 oz) 94% 31.66 kg/m2      [email or web address] Information     [employer] gives you secure access to your electronic health record. If you see a primary care provider, you can also send messages to your care team and make appointments. If you have questions, please call your primary care clinic.  If you do not have a primary care provider, please call [phone] and they will assist you.        Care EveryWhere ID     This is your Care EveryWhere ID. This could be used by other organizations to access your [facility] medical records  [id number]        Equal Access to Services     [facility] : Jose Lim, jose rey, qabina lord. So [facility] [phone].    ATENCIÓN: Si habla español, tiene a sen disposición servicios gratuitos de asistencia " lingüísticaMaura Mcintyre al [phone].    We comply with applicable federal civil rights laws and [place] laws. We do not discriminate on the basis of race, color, national origin, age, disability, sex, sexual orientation, or gender identity.               Review of your medicines      UNREVIEWED medicines. Ask your doctor about these medicines        Dose / Directions    lisinopril-hydrochlorothiazide 20-12.5 MG per tablet   Commonly known as:  PRINZIDE/ZESTORETIC   Used for:  Benign essential hypertension        Dose:  2 tablet   Take 2 tablets by mouth daily   Quantity:  60 tablet   Refills:  0       minocycline 100 MG capsule   Commonly known as:  MINOCIN/DYNACIN   Used for:  Rosacea        Dose:  100 mg   Take 1 capsule (100 mg) by mouth 2 times daily   Quantity:  180 capsule   Refills:  3                Protect others around you: Learn how to safely use, store and throw away your medicines at www.disposemymeds.org.             Medication List: This is a list of all your medications and when to take them. Check marks below indicate your daily home schedule. Keep this list as a reference.      Medications           Morning Afternoon Evening Bedtime As Needed    lisinopril-hydrochlorothiazide 20-12.5 MG per tablet   Commonly known as:  PRINZIDE/ZESTORETIC   Take 2 tablets by mouth daily                                minocycline 100 MG capsule   Commonly known as:  MINOCIN/DYNACIN   Take 1 capsule (100 mg) by mouth 2 times daily

## 2018-10-01 NOTE — ANESTHESIA CARE TRANSFER NOTE
Patient: Zackery Rogers    Procedure(s):  Colonoscopy - Wound Class: II-Clean Contaminated    Diagnosis: screening  Diagnosis Additional Information: No value filed.    Anesthesia Type:   MAC     Note:    Patient transferred to:Phase II  Handoff Report: Identifed the Patient, Identified the Reponsible Provider, Reviewed the pertinent medical history, Discussed the surgical course, Reviewed Intra-OP anesthesia mangement and issues during anesthesia, Set expectations for post-procedure period and Allowed opportunity for questions and acknowledgement of understanding      Vitals: (Last set prior to Anesthesia Care Transfer)    CRNA VITALS  10/1/2018 1115 - 10/1/2018 1148      10/1/2018             SpO2: 97 %    Resp Rate (observed): 25                Electronically Signed By: DINH Delarosa CRNA  October 1, 2018  11:48 AM

## 2018-10-02 ENCOUNTER — TELEPHONE (OUTPATIENT)
Dept: FAMILY MEDICINE | Facility: OTHER | Age: 58
End: 2018-10-02

## 2018-10-02 NOTE — TELEPHONE ENCOUNTER
Reason for Call:  Form, our goal is to have forms completed with 72 hours, however, some forms may require a visit or additional information.    Type of letter, form or note:  medical    Who is the form from?: Insurance comp    Where did the form come from: Patient or family brought in       What clinic location was the form placed at?: [facility]    Where the form was placed: 's Box    What number is listed as a contact on the form?: [phone]       Additional comments: Patient had test and wants doctor to fill out the postcard and mail it to [facility]    Call taken on 10/2/2018 at 1:48 PM by Jesse Amador

## 2018-10-02 NOTE — PROGRESS NOTES
Post clinic needs/ Message sent to clinic coordinators to organize:   Please schedule CT in the am and then clinic visit with Dr. Navarro same day, a couple hours later in the day so the CT can be reviewed in clinic.     Needs to be scheduled in 4 months.  Annmarie KAMARA       Clinic coordinators unable to get a hold of patient:   Hello,   We have called this PT, multiple of times and left messages. No response. Is there anything else you would like us to do?     Thanks,   Marco A    Use Separate Skin Prep For Stages And Repair: Yes

## 2018-10-04 DIAGNOSIS — I10 UNCONTROLLED HYPERTENSION: ICD-10-CM

## 2018-10-04 NOTE — TELEPHONE ENCOUNTER
Prinzide  Routing refill request to provider for review/approval because:  Cyndi given and patient did not follow up  Labs not current:  [facility]    Kennedi Tavarez, RN, BSN

## 2018-10-05 ENCOUNTER — ALLIED HEALTH/NURSE VISIT (OUTPATIENT)
Dept: FAMILY MEDICINE | Facility: OTHER | Age: 58
End: 2018-10-05
Payer: COMMERCIAL

## 2018-10-05 VITALS — HEART RATE: 92 BPM | DIASTOLIC BLOOD PRESSURE: 78 MMHG | SYSTOLIC BLOOD PRESSURE: 110 MMHG

## 2018-10-05 DIAGNOSIS — I10 BENIGN ESSENTIAL HYPERTENSION: Primary | ICD-10-CM

## 2018-10-05 PROCEDURE — 99207 ZZC NO CHARGE NURSE ONLY: CPT | Performed by: STUDENT IN AN ORGANIZED HEALTH CARE EDUCATION/TRAINING PROGRAM

## 2018-10-05 NOTE — TELEPHONE ENCOUNTER
Spoke with patient he states that he had his BP checked at [facility] on 9/28/2018 but there is no record of it, I called the pharmacy and the tech that was there that day and might have done the BP was not in today, and there is no record of it there either.  I explained to patient that we need him to get his BP checked again patient states he can do this on Monday but is not sure if he will have enough medication to get him through Monday, are you willing to send him in a new Rx to get him through, he states he thinks his BP was under 120 like 113.  Please advise, thanks  Bettina Patrick RT (R)

## 2018-10-05 NOTE — MR AVS SNAPSHOT
After Visit Summary   10/5/2018    Zackery Rogers    MRN: [id number]           Patient Information     Date Of Birth          1960        Visit Information        Provider Department      10/5/2018 12:26 PM Kalina Poe APRN CNP [facility]        Today's Diagnoses     Benign essential hypertension    -  1       Follow-ups after your visit        Who to contact     If you have questions or need follow up information about today's clinic visit or your schedule please contact [facility] directly at [phone].  Normal or non-critical lab and imaging results will be communicated to you by IForemhart, letter or phone within 4 business days after the clinic has received the results. If you do not hear from us within 7 days, please contact the clinic through IForemhart or phone. If you have a critical or abnormal lab result, we will notify you by phone as soon as possible.  Submit refill requests through [employer] or call your pharmacy and they will forward the refill request to us. Please allow 3 business days for your refill to be completed.          Additional Information About Your Visit        MyChart Information     [employer] gives you secure access to your electronic health record. If you see a primary care provider, you can also send messages to your care team and make appointments. If you have questions, please call your primary care clinic.  If you do not have a primary care provider, please call [phone] and they will assist you.        Care EveryWhere ID     This is your Care EveryWhere ID. This could be used by other organizations to access your [facility] medical records  [id number]        Your Vitals Were     Pulse                   92            Blood Pressure from Last 3 Encounters:   10/05/18 110/78   10/01/18 (!) 116/94   09/20/18 110/70    Weight from Last 3 Encounters:   10/01/18 226 lb 3.2 oz (102.6 kg)   09/04/18 226 lb 3.2 oz (102.6  kg)   07/13/17 218 lb 3.2 oz (99 kg)              Today, you had the following     No orders found for display       Primary Care Provider Office Phone # Fax #    DINH Saravia [facility] [phone]       [address] [facility] [address]        Equal Access to Services     [facility] : Hadii aad ku hadasho Soomaali, waaxda luqadaha, qaybta kaalmada adeegyada, waxay idiin hayaan adeeg kharash la'aan . So [facility] [phone].    ATENCIÓN: Si habla español, tiene a sen disposición servicios gratuitos de asistencia lingüística. Llame al [phone].    We comply with applicable federal civil rights laws and [place] laws. We do not discriminate on the basis of race, color, national origin, age, disability, sex, sexual orientation, or gender identity.            Thank you!     Thank you for choosing [facility]  for your care. Our goal is always to provide you with excellent care. Hearing back from our patients is one way we can continue to improve our services. Please take a few minutes to complete the written survey that you may receive in the mail after your visit with us. Thank you!             Your Updated Medication List - Protect others around you: Learn how to safely use, store and throw away your medicines at www.disposemymeds.org.          This list is accurate as of 10/5/18 12:28 PM.  Always use your most recent med list.                   Brand Name Dispense Instructions for use Diagnosis    lisinopril-hydrochlorothiazide 20-12.5 MG per tablet    PRINZIDE/ZESTORETIC    60 tablet    Take 2 tablets by mouth daily    Benign essential hypertension       minocycline 100 MG capsule    MINOCIN/DYNACIN    180 capsule    Take 1 capsule (100 mg) by mouth 2 times daily    Rosacea

## 2018-10-05 NOTE — PROGRESS NOTES
Zackery Rogers is enrolled/participating in the retail pharmacy Blood Pressure Goals Achievement Program (BPGAP).  Zackery Rogers was evaluated at [facility] on October 5, 2018 at which time his blood pressure was:    BP Readings from Last 3 Encounters:   10/05/18 110/78   10/01/18 (!) 116/94   09/20/18 110/70     Reviewed lifestyle modifications for blood pressure control and reduction: including making healthy food choices, managing weight, getting regular exercise, smoking cessation, reducing alcohol consumption, monitoring blood pressure regularly.     Zackery Rogers is not experiencing symptoms.    Follow-Up: BP is at goal of < 140/90mmHg (patient 18+ years of age with or without diabetes).  Recommended follow-up at pharmacy in 6 months.     Recommendation to Provider: none     Zackery Rogers was evaluated for enrollment into the PGEN study today.    PLEASE INITIATE ENROLLMENT DISCUSSION WITH HTN PTS  1) Between 30-80 years old                                                                                                               2) BMI between 19-50                                                                                                        3) BP ?140/90 AND ?170/110 patients aged 30-59         BP ?150/90 AND ?170/110 non-diabetic patients aged 60-80       BP ?140/90 AND ?170/110 diabetic patients aged 60-80  4) Additional requirements for uncontrolled HTN patients:        Pt on only 1 class of medication  5) EXCLUDE patient if confirmation of:                  ? Cardiac disease                  ? Chronic Kidney Disease                  ? Pregnancy/Breastfeeding                  ? Secondary Hypertension/Pre-eclampsia                                 ? Vascular disease    Patient eligible for enrollment:  No  Patient interested in enrollment:  No    This note completed by:  -Elisha Forbes, Pharm.D., [facility], [phone]

## 2018-10-05 NOTE — TELEPHONE ENCOUNTER
Spoke with patient he states he had BP checked today at [facility] pharmacy  He also wanted to mention that since he has started taking Lisinopril he has been up at least 5 times a night using the restroom, is this normal, or is there another option so he isn't up so much?  Please advise thanks  Bettina Patrick RT (R)

## 2018-10-05 NOTE — TELEPHONE ENCOUNTER
Patient came in for BP check.  It appears there was some confusion on the dates.  Per patient he has only been to our pharmacy one time to have his BP checked, which was on 9/20/18 (documented bp check in Epic on that date).  He said he was in 1 week ago but I explained to him that 9/20/18 was 2 weeks ago.    Checked it today and it was ok.     -Elisha Forbes, Pharm.D., [facility], [phone]

## 2018-10-08 RX ORDER — LISINOPRIL 40 MG/1
40 TABLET ORAL DAILY
Qty: 90 TABLET | Refills: 0 | Status: SHIPPED | OUTPATIENT
Start: 2018-10-08 | End: 2019-03-30

## 2018-10-08 RX ORDER — LISINOPRIL AND HYDROCHLOROTHIAZIDE 12.5; 2 MG/1; MG/1
TABLET ORAL
Qty: 60 TABLET | Refills: 0 | OUTPATIENT
Start: 2018-10-08

## 2018-10-08 RX ORDER — AMLODIPINE BESYLATE 2.5 MG/1
2.5 TABLET ORAL DAILY
Qty: 30 TABLET | Refills: 0 | Status: SHIPPED | OUTPATIENT
Start: 2018-10-08 | End: 2018-11-07

## 2018-10-08 NOTE — TELEPHONE ENCOUNTER
Left detailed message for patient. Will stop lisinopril/hctz and change to lisinopril 40 mg daily with amlodipine 2.5 mg daily. Recheck BP at pharmacy in 2-3 weeks. Call if any side effects.  Kalina Poe NP-C

## 2018-11-05 ENCOUNTER — TRANSFERRED RECORDS (OUTPATIENT)
Dept: HEALTH INFORMATION MANAGEMENT | Facility: CLINIC | Age: 58
End: 2018-11-05

## 2018-11-07 ENCOUNTER — TELEPHONE (OUTPATIENT)
Dept: FAMILY MEDICINE | Facility: OTHER | Age: 58
End: 2018-11-07

## 2018-11-07 DIAGNOSIS — I10 UNCONTROLLED HYPERTENSION: ICD-10-CM

## 2018-11-07 RX ORDER — AMLODIPINE BESYLATE 2.5 MG/1
2.5 TABLET ORAL DAILY
Qty: 30 TABLET | Refills: 1 | Status: SHIPPED | OUTPATIENT
Start: 2018-11-07 | End: 2019-02-09

## 2018-11-07 NOTE — TELEPHONE ENCOUNTER
Please call pt to let him know what the plan Is regarding his BP med.  He is almost out of what he is taking.  thanks

## 2018-11-07 NOTE — TELEPHONE ENCOUNTER
BP Readings from Last 3 Encounters:   10/05/18 110/78   10/01/18 (!) 116/94   09/20/18 110/70     BP is at goal, he should be staying on the same medications.      Dry mouth and up most of the night.   States that he's been looking up online and most of the BP medications cause dry mouth.  Will try an OTC dry mouth wash also    Refill sent for patient and will continue current dose.    John Flynn, RN, BSN

## 2019-02-09 ENCOUNTER — TELEPHONE (OUTPATIENT)
Dept: FAMILY MEDICINE | Facility: OTHER | Age: 59
End: 2019-02-09

## 2019-02-09 DIAGNOSIS — I10 UNCONTROLLED HYPERTENSION: ICD-10-CM

## 2019-02-11 NOTE — TELEPHONE ENCOUNTER
"Requested Prescriptions   Pending Prescriptions Disp Refills     amLODIPine (NORVASC) 2.5 MG tablet [Pharmacy Med Name: amLODIPine Besylate Oral Tablet 2.5 MG] 30 tablet 0     Sig: Take 1 tablet (2.5 mg) by mouth daily    Calcium Channel Blockers Protocol  Failed - 2/9/2019  5:05 PM       Failed - Normal serum creatinine on file in past 12 months    Recent Labs   Lab Test 05/19/17  1005   CR 0.93          Passed - Blood pressure under 140/90 in past 12 months    BP Readings from Last 3 Encounters:   10/05/18 110/78   10/01/18 (!) 116/94   09/20/18 110/70          Passed - Recent (12 mo) or future (30 days) visit within the authorizing provider's specialty    Patient had office visit in the last 12 months or has a visit in the next 30 days with authorizing provider or within the authorizing provider's specialty.  See \"Patient Info\" tab in inbasket, or \"Choose Columns\" in Meds & Orders section of the refill encounter.         Passed - Medication is active on med list       Passed - Patient is age 18 or older        amLODIPine (NORVASC) 2.5 MG tablet  Routing refill request to provider for review/approval because:  Labs out of range:  Creatinine     Anna Edmond RN, BSN           "

## 2019-02-12 RX ORDER — AMLODIPINE BESYLATE 2.5 MG/1
2.5 TABLET ORAL DAILY
Qty: 30 TABLET | Refills: 0 | Status: SHIPPED | OUTPATIENT
Start: 2019-02-12 | End: 2019-03-07 | Stop reason: SINTOL

## 2019-02-12 NOTE — TELEPHONE ENCOUNTER
Looks like patient needs to have his kidney function checked. Please call him and see if he would be able to stop by a [facility] lab for a lab-only visit. Apologize as we should have done this at his visit with me in September. Also please ask him how his weight is doing. He had sent me a My Chart message that he was losing a lot of weight.  Thank you,  Kalina Poe, CNP

## 2019-02-14 NOTE — TELEPHONE ENCOUNTER
Pt returned call, relayed below. Pt will look at schedule and see when he can get in. Pt states weight is going good, he lost 50 pounds and is feeling great.

## 2019-02-14 NOTE — TELEPHONE ENCOUNTER
LMTC, please see message below and assist in scheduling lab appointment  Thanks  Bettina Patrick RT (R)

## 2019-02-26 ENCOUNTER — TELEPHONE (OUTPATIENT)
Dept: FAMILY MEDICINE | Facility: OTHER | Age: 59
End: 2019-02-26

## 2019-02-26 DIAGNOSIS — Z13.220 LIPID SCREENING: ICD-10-CM

## 2019-02-26 DIAGNOSIS — Z13.1 SCREENING FOR DIABETES MELLITUS: Primary | ICD-10-CM

## 2019-02-26 NOTE — TELEPHONE ENCOUNTER
This patient is on our lab schedule for this Thursday please place any orders you would like completed.    Thank you,  Vandana

## 2019-02-27 NOTE — TELEPHONE ENCOUNTER
Added BMP and lipid panel. Not sure if he will be fasting so okay to skip lipid panel if not fasting.  Thanks,  Kalina Poe, CNP

## 2019-02-28 DIAGNOSIS — Z13.220 LIPID SCREENING: ICD-10-CM

## 2019-02-28 DIAGNOSIS — Z13.1 SCREENING FOR DIABETES MELLITUS: ICD-10-CM

## 2019-02-28 LAB
ANION GAP SERPL CALCULATED.3IONS-SCNC: 5 MMOL/L (ref 3–14)
BUN SERPL-MCNC: 10 MG/DL (ref 7–30)
CALCIUM SERPL-MCNC: 8.9 MG/DL (ref 8.5–10.1)
CHLORIDE SERPL-SCNC: 104 MMOL/L (ref 94–109)
CHOLEST SERPL-MCNC: 185 MG/DL
CO2 SERPL-SCNC: 31 MMOL/L (ref 20–32)
CREAT SERPL-MCNC: 0.84 MG/DL (ref 0.66–1.25)
GFR SERPL CREATININE-BSD FRML MDRD: >90 ML/MIN/{1.73_M2}
GLUCOSE SERPL-MCNC: 287 MG/DL (ref 70–99)
HDLC SERPL-MCNC: 39 MG/DL
LDLC SERPL CALC-MCNC: 127 MG/DL
NONHDLC SERPL-MCNC: 146 MG/DL
POTASSIUM SERPL-SCNC: 4.6 MMOL/L (ref 3.4–5.3)
SODIUM SERPL-SCNC: 140 MMOL/L (ref 133–144)
TRIGL SERPL-MCNC: 96 MG/DL

## 2019-02-28 PROCEDURE — 80061 LIPID PANEL: CPT | Performed by: STUDENT IN AN ORGANIZED HEALTH CARE EDUCATION/TRAINING PROGRAM

## 2019-02-28 PROCEDURE — 80048 BASIC METABOLIC PNL TOTAL CA: CPT | Performed by: STUDENT IN AN ORGANIZED HEALTH CARE EDUCATION/TRAINING PROGRAM

## 2019-02-28 PROCEDURE — 36415 COLL VENOUS BLD VENIPUNCTURE: CPT | Performed by: STUDENT IN AN ORGANIZED HEALTH CARE EDUCATION/TRAINING PROGRAM

## 2019-03-01 ENCOUNTER — TELEPHONE (OUTPATIENT)
Dept: FAMILY MEDICINE | Facility: OTHER | Age: 59
End: 2019-03-01

## 2019-03-01 DIAGNOSIS — R93.5 ABNORMAL CT OF THE ABDOMEN: Primary | ICD-10-CM

## 2019-03-01 DIAGNOSIS — Z87.19 HISTORY OF ACUTE PANCREATITIS: ICD-10-CM

## 2019-03-01 DIAGNOSIS — R63.4 UNINTENDED WEIGHT LOSS: ICD-10-CM

## 2019-03-01 DIAGNOSIS — E13.65 OTHER SPECIFIED DIABETES MELLITUS WITH HYPERGLYCEMIA, UNSPECIFIED WHETHER LONG TERM INSULIN USE (H): ICD-10-CM

## 2019-03-01 NOTE — TELEPHONE ENCOUNTER
Patient scheduled for CT scan on 3/5/2019 8am arrival 8:30 scan shelley NPO 2 hrs prior in [place]    Spoke with patient appointment scheduled     Next 5 appointments (look out 90 days)    Mar 07, 2019  7:20 AM CST  Office Visit with DINH Saravia CNP  [facility] [address] [facility] [address] [phone]        Closing encounter  Bettina Patrick RT (R)

## 2019-03-01 NOTE — TELEPHONE ENCOUNTER
Needs CT scan scheduled and appointment with me early next week for new diagnosis diabetes. He prefers early morning appointments.  PITER MendezC

## 2019-03-01 NOTE — TELEPHONE ENCOUNTER
L/m for patient to return call. Please find me as I would like to talk to him when he calls back.  PITER MendezC

## 2019-03-01 NOTE — TELEPHONE ENCOUNTER
LMTC, please inform patient that Kalina Poe would like him to get a CT scan and follow up appointment with her early next week.  I did schedule CT Scan in [place] on 3/4/2019 arrival time 8am, scan time 8:30 Nothing to eat/drink after 6:30am on 3/4/2019.  Please also help him get scheduled with Kalina Poe for next week  Thanks  Bettina Patrick RT (R)

## 2019-03-04 NOTE — PROGRESS NOTES
SUBJECTIVE:   Zackery Rogers is a 58 year old male who presents to clinic today for the following health issues:    HPI     Diabetes Follow-up      Patient is checking blood sugars: not at all    Diabetic concerns: None     Symptoms of hypoglycemia (low blood sugar): ever since he got on his bp med its been better      Paresthesias (numbness or burning in feet) or sores: No     Date of last diabetic eye exam: not had one    BP Readings from Last 2 Encounters:   10/05/18 110/78   10/01/18 (!) 116/94     LDL Cholesterol Calculated (mg/dL)   Date Value   02/28/2019 127 (H)   04/26/2017 88     Patient has history of acute pancreatitis with 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  He had a procedure to drain the walled off this which was successful.  He was told that he had 40% left of his pancreatic function and that this would be sufficient enough to continue to live normally.  Several months ago he started losing weight fairly rapidly and has lost about 40 pounds.  He attributed the weight loss to his new job which is very busy and has long hours.  At the time of the weight loss he was also noticing that he was more hungry and more thirsty.  His symptoms started shortly after he started amlodipine and he is wondering if this caused him to become diabetic. In general he is feeling much better now that he has lost so much weight.  He denies changes in his bowels or bladder function.  He does have a history of urinary frequency that predates diagnosis of diabetes.  He denies numbness and tingling in his fingers and toes however he does note that as of late he has had a harder time keeping his toes warm when he is in the cold weather.  He attributed this to weight loss and less insulation from fat.  However he is here today to discuss elevated blood sugar on recent blood tests and results of follow-up abdominal CT scan.  He works as a .  He is .  He is a non-smoker.  His past medical history includes  hypertension, rosacea, obesity. He has no family history of diabetes.    Diabetes Management Resources  Problem list and histories reviewed & adjusted, as indicated.  Additional history: as documented    Patient Active Problem List   Diagnosis     Advanced directives, counseling/discussion     Swelling of both ankles     Night sweats     History of colonic polyps     Benign essential hypertension     Rosacea     Obesity (BMI 30.0-34.9)     Class 1 obesity due to excess calories with serious comorbidity and body mass index (BMI) of 31.0 to 31.9 in adult     Type 2 diabetes mellitus without complication, without long-term current use of insulin (H)     Past Surgical History:   Procedure Laterality Date     COLONOSCOPY N/A 10/1/2018    Procedure: COLONOSCOPY;  Colonoscopy;  Surgeon: Rajinder Ridley MD;  Location: [facility]     ENDOSCOPIC RETROGRADE CHOLANGIOPANCREATOGRAM, NECROSECTOMY N/A 6/5/2017    Procedure: ENDOSCOPIC RETROGRADE CHOLANGIOPANCREATOGRAM, NECROSECTOMY;  Endoscopic Retrograde Chlangiopancreatogram, Necrosectomy ;  Surgeon: Guru Diana Navarro MD;  Location: [facility]     ENDOSCOPIC ULTRASOUND UPPER GASTROINTESTINAL TRACT (GI) N/A 5/19/2017    Procedure: ENDOSCOPIC ULTRASOUND, ESOPHAGOSCOPY / UPPER GASTROINTESTINAL TRACT (GI);  Upper Endoscopic Ultrasound with with cyst gastrostomy, hot axios stent;  Surgeon: Guru Diana Navarro MD;  Location: [facility]     NO HISTORY OF SURGERY         Social History     Tobacco Use     Smoking status: Never Smoker     Smokeless tobacco: Never Used   Substance Use Topics     Alcohol use: Yes     Comment: 1 x year     Family History   Problem Relation Age of Onset     Alzheimer Disease Mother      Myocardial Infarction Mother      Cerebrovascular Disease Father      Family History Negative Brother      Family History Negative Sister      Family History Negative Brother      Family History Negative Brother          Current Outpatient  "Medications   Medication Sig Dispense Refill     aspirin (ASA) 81 MG tablet Take 1 tablet (81 mg) by mouth daily 100 tablet 3     lisinopril (PRINIVIL/ZESTRIL) 40 MG tablet Take 1 tablet (40 mg) by mouth daily 90 tablet 0     metFORMIN (GLUCOPHAGE-XR) 500 MG 24 hr tablet Take 1 tablet (500 mg) by mouth 2 times daily (with meals) 180 tablet 0     minocycline (MINOCIN/DYNACIN) 100 MG capsule Take 1 capsule (100 mg) by mouth 2 times daily 180 capsule 3     simvastatin (ZOCOR) 20 MG tablet Take 1 tablet (20 mg) by mouth At Bedtime 90 tablet 3     No Known Allergies  Recent Labs   Lab Test 03/07/19  0810 02/28/19  0741 05/19/17  1005 05/18/17  0954 04/26/17  0948   A1C 12.7*  --   --   --   --    LDL  --  127*  --   --  88   HDL  --  39*  --   --  29*   TRIG  --  96  --   --  83   ALT  --   --  16 23 22   CR  --  0.84 0.93 1.34* 0.71   GFRESTIMATED  --  >90 83 55* >90  Non  GFR Calc     GFRESTBLACK  --  >90 >90   GFR Calc   66 >90   GFR Calc     POTASSIUM  --  4.6 3.8 4.5 4.1   TSH 1.33  --   --   --  1.58      BP Readings from Last 3 Encounters:   03/07/19 128/74   10/05/18 110/78   10/01/18 (!) 116/94    Wt Readings from Last 3 Encounters:   03/07/19 89.9 kg (198 lb 4.8 oz)   10/01/18 102.6 kg (226 lb 3.2 oz)   09/04/18 102.6 kg (226 lb 3.2 oz)                  Labs reviewed in EPIC    ROS:  Constitutional, HEENT, cardiovascular, pulmonary, GI, , musculoskeletal, neuro, skin, endocrine and psych systems are negative, except as otherwise noted.    OBJECTIVE:     /74   Pulse 80   Temp 96.8  F (36  C) (Temporal)   Resp 16   Ht 1.798 m (5' 10.8\")   Wt 89.9 kg (198 lb 4.8 oz)   SpO2 97%   BMI 27.81 kg/m    Body mass index is 27.81 kg/m .  GENERAL: alert, no distress and overweight  EYES: Eyes grossly normal to inspection, PERRL and conjunctivae and sclerae normal  HENT: ear canals and TM's normal, nose and mouth without ulcers or lesions  NECK: no adenopathy, no " asymmetry, masses, or scars and thyroid normal to palpation  RESP: lungs clear to auscultation - no rales, rhonchi or wheezes  CV: regular rate and rhythm, normal S1 S2, no S3 or S4, no murmur, click or rub, no peripheral edema and peripheral pulses strong  ABDOMEN: soft, nontender, no hepatosplenomegaly, no masses and bowel sounds normal  MS: no gross musculoskeletal defects noted, no edema  SKIN: no suspicious lesions or rashes  NEURO: Normal strength and tone, mentation intact and speech normal  PSYCH: mentation appears normal, affect normal/bright  Diabetic foot exam: normal DP and PT pulses, no trophic changes or ulcerative lesions and normal sensory exam    Diagnostic Test Results:  Results for orders placed or performed in visit on 03/07/19   Hemoglobin A1c   Result Value Ref Range    Hemoglobin A1C 12.7 (H) 0 - 5.6 %   Insulin level   Result Value Ref Range    Insulin 8.9 3 - 25 mU/L   Albumin Random Urine Quantitative with Creat Ratio   Result Value Ref Range    Creatinine Urine 106 mg/dL    Albumin Urine mg/L 6 mg/L    Albumin Urine mg/g Cr 5.88 0 - 17 mg/g Cr   TSH with free T4 reflex   Result Value Ref Range    TSH 1.33 0.40 - 4.00 mU/L     Exam: CT ABDOMEN PELVIS W CONTRAST  3/5/2019 8:51 AM     History: Abdominal pain, history of necrotizing pancreatitis, 40 pound  weight loss in the last 2 months.     Comparison: 12/8/2017 CT.     Technique: Volumetric acquisition with reconstruction in the axial,  coronal planes through the abdomen and pelvis without and with  contrast. Precontrast imaging obtained through the abdomen as well as  arterial phase postcontrast imaging. Portal venous phase postcontrast  imaging obtained through the abdomen and pelvis. Radiation dose for  this scan was reduced using automated exposure control, adjustment of  the mA and/or kV according to patient size, or iterative  reconstruction technique.     Contrast: 100mL, Isovue 370     Findings:   Lung Bases: Lung bases are clear.  No pleural or pericardial effusion.     Abdomen: Calcified granulomas spleen. Likely hemangioma again seen  posteriorly in the lower RIGHT hepatic lobe, liver otherwise normal.  Gallbladder, adrenal glands and kidneys appear normal.     Atrophic pancreatic neck and body with a cystic lesion measuring 1.8  cm at the tail with the upstream most portion of the pancreatic tail  remaining present. The cystic lesion appears to be a more well-formed  follow-up necrosis, versus focal dilatation of the pancreatic duct.  This has formed were not discretely since 12/8/2017 and while it does  appear to be somewhat tethered to the stomach, does not appear to  communicate with the gastric lumen. No arterially enhancing masses are  seen involving the pancreas.     No areas of bowel wall thickening or bowel dilatation. Normal  appendix. No free fluid.     Bones: No concerning lytic or sclerotic lesions.                                                                      Impression:   1. Sequela of prior necrotizing pancreatitis with atrophy of the  pancreatic neck and body and a cystic structure adjacent to the  remaining tail that likely represents more well-formed walled off  necrosis versus a focal ductal dilatation. No pancreatic masses are  identified.  2. Sequela of prior granulomatous disease in the spleen.     MICAH IVORY MD    ASSESSMENT/PLAN:     1. New onset type 2 diabetes mellitus (H)  New onset diabetes mellitus likely due in part to partial loss of pancreatic function from past pancreatitis with walled off necrosis causing damage to a portion of his pancreas. We repeated the CT scan and there is another area of walled off necrosis but thankfully no masses.  I have recommended that he see gastroenterology to follow-up on this.  We will start him on Metformin twice daily and insulin long-acting.  I have placed a referral to endocrinology for consult.  I am willing to follow him after he sees them for consult if  appropriate.  Recommend he meets with the diabetic educator and he is agreeable to this.  Still waiting on the results of the islet cell antibody test.  - Islet cell antibody IgG  - Hemoglobin A1c  - Insulin level  - metFORMIN (GLUCOPHAGE-XR) 500 MG 24 hr tablet; Take 1 tablet (500 mg) by mouth 2 times daily (with meals)  Dispense: 180 tablet; Refill: 0  - insulin detemir (LEVEMIR PEN) 100 UNIT/ML pen; Inject 10 Units Subcutaneous At Bedtime Increase dose by 2 units every 3 days until morning fasting blood sugar is 130 or less  Dispense: 9 mL; Refill: 1  - DIABETES EDUCATOR REFERRAL  - Albumin Random Urine Quantitative with Creat Ratio  - TSH with free T4 reflex  - ENDOCRINOLOGY ADULT REFERRAL    2. Mixed hyperlipidemia  Stable.  Continue current medication.  Work on low-cholesterol diet and exercise.  - simvastatin (ZOCOR) 20 MG tablet; Take 1 tablet (20 mg) by mouth At Bedtime  Dispense: 90 tablet; Refill: 3    3. Pancreas cyst  See note #1.  - GASTROENTEROLOGY ADULT REF CONSULT ONLY    4. Preventive measure  - aspirin (ASA) 81 MG tablet; Take 1 tablet (81 mg) by mouth daily  Dispense: 100 tablet; Refill: 3    5. Benign essential hypertension  Stable.  I did recommend that he stop the amlodipine given the onset of his symptoms of diabetes correlated with the start of amlodipine.  He was only taking it every other day at home as it was dropping his blood pressure too much and he was symptomatic.  Continue lisinopril 40 mg daily.  Continue to monitor blood pressure and follow-up if it is not at goal of 120/80 or less.     Greater than 50% of 45 minute visit were spent on counseling or coordination of care regarding new diagnosis of diabetes, hyperlipidemia, pancreatic cyst, hypertension.       DINH Somers [facility]

## 2019-03-05 ENCOUNTER — HOSPITAL ENCOUNTER (OUTPATIENT)
Dept: CT IMAGING | Facility: CLINIC | Age: 59
Discharge: HOME | End: 2019-03-05
Attending: STUDENT IN AN ORGANIZED HEALTH CARE EDUCATION/TRAINING PROGRAM | Admitting: STUDENT IN AN ORGANIZED HEALTH CARE EDUCATION/TRAINING PROGRAM
Payer: COMMERCIAL

## 2019-03-05 DIAGNOSIS — R63.4 UNINTENDED WEIGHT LOSS: ICD-10-CM

## 2019-03-05 DIAGNOSIS — R93.5 ABNORMAL CT OF THE ABDOMEN: ICD-10-CM

## 2019-03-05 DIAGNOSIS — E13.65 OTHER SPECIFIED DIABETES MELLITUS WITH HYPERGLYCEMIA, UNSPECIFIED WHETHER LONG TERM INSULIN USE (H): ICD-10-CM

## 2019-03-05 DIAGNOSIS — Z87.19 HISTORY OF ACUTE PANCREATITIS: ICD-10-CM

## 2019-03-05 PROCEDURE — 25000125 ZZHC RX 250: Performed by: STUDENT IN AN ORGANIZED HEALTH CARE EDUCATION/TRAINING PROGRAM

## 2019-03-05 PROCEDURE — 25000128 H RX IP 250 OP 636: Performed by: STUDENT IN AN ORGANIZED HEALTH CARE EDUCATION/TRAINING PROGRAM

## 2019-03-05 PROCEDURE — 74177 CT ABD & PELVIS W/CONTRAST: CPT

## 2019-03-05 RX ORDER — IOPAMIDOL 755 MG/ML
500 INJECTION, SOLUTION INTRAVASCULAR ONCE
Status: COMPLETED | OUTPATIENT
Start: 2019-03-05 | End: 2019-03-05

## 2019-03-05 RX ADMIN — SODIUM CHLORIDE 60 ML: 9 INJECTION, SOLUTION INTRAVENOUS at 08:48

## 2019-03-05 RX ADMIN — IOPAMIDOL 100 ML: 755 INJECTION, SOLUTION INTRAVENOUS at 08:48

## 2019-03-07 ENCOUNTER — OFFICE VISIT (OUTPATIENT)
Dept: FAMILY MEDICINE | Facility: OTHER | Age: 59
End: 2019-03-07
Payer: COMMERCIAL

## 2019-03-07 VITALS
TEMPERATURE: 96.8 F | HEART RATE: 80 BPM | RESPIRATION RATE: 16 BRPM | BODY MASS INDEX: 27.76 KG/M2 | OXYGEN SATURATION: 97 % | WEIGHT: 198.3 LBS | DIASTOLIC BLOOD PRESSURE: 74 MMHG | SYSTOLIC BLOOD PRESSURE: 128 MMHG | HEIGHT: 71 IN

## 2019-03-07 DIAGNOSIS — Z29.9 PREVENTIVE MEASURE: ICD-10-CM

## 2019-03-07 DIAGNOSIS — E78.2 MIXED HYPERLIPIDEMIA: ICD-10-CM

## 2019-03-07 DIAGNOSIS — I10 BENIGN ESSENTIAL HYPERTENSION: ICD-10-CM

## 2019-03-07 DIAGNOSIS — E11.9 NEW ONSET TYPE 2 DIABETES MELLITUS (H): Primary | ICD-10-CM

## 2019-03-07 DIAGNOSIS — K86.2 PANCREAS CYST: ICD-10-CM

## 2019-03-07 LAB
CREAT UR-MCNC: 106 MG/DL
HBA1C MFR BLD: 12.7 % (ref 0–5.6)
INSULIN SERPL-ACNC: 8.9 MU/L (ref 3–25)
MICROALBUMIN UR-MCNC: 6 MG/L
MICROALBUMIN/CREAT UR: 5.88 MG/G CR (ref 0–17)
TSH SERPL DL<=0.005 MIU/L-ACNC: 1.33 MU/L (ref 0.4–4)

## 2019-03-07 PROCEDURE — 36415 COLL VENOUS BLD VENIPUNCTURE: CPT | Performed by: STUDENT IN AN ORGANIZED HEALTH CARE EDUCATION/TRAINING PROGRAM

## 2019-03-07 PROCEDURE — 86341 ISLET CELL ANTIBODY: CPT | Mod: 90 | Performed by: STUDENT IN AN ORGANIZED HEALTH CARE EDUCATION/TRAINING PROGRAM

## 2019-03-07 PROCEDURE — 82043 UR ALBUMIN QUANTITATIVE: CPT | Performed by: STUDENT IN AN ORGANIZED HEALTH CARE EDUCATION/TRAINING PROGRAM

## 2019-03-07 PROCEDURE — 83036 HEMOGLOBIN GLYCOSYLATED A1C: CPT | Performed by: STUDENT IN AN ORGANIZED HEALTH CARE EDUCATION/TRAINING PROGRAM

## 2019-03-07 PROCEDURE — 99000 SPECIMEN HANDLING OFFICE-LAB: CPT | Performed by: STUDENT IN AN ORGANIZED HEALTH CARE EDUCATION/TRAINING PROGRAM

## 2019-03-07 PROCEDURE — 99215 OFFICE O/P EST HI 40 MIN: CPT | Performed by: STUDENT IN AN ORGANIZED HEALTH CARE EDUCATION/TRAINING PROGRAM

## 2019-03-07 PROCEDURE — 83525 ASSAY OF INSULIN: CPT | Performed by: STUDENT IN AN ORGANIZED HEALTH CARE EDUCATION/TRAINING PROGRAM

## 2019-03-07 PROCEDURE — 84443 ASSAY THYROID STIM HORMONE: CPT | Performed by: STUDENT IN AN ORGANIZED HEALTH CARE EDUCATION/TRAINING PROGRAM

## 2019-03-07 RX ORDER — METFORMIN HCL 500 MG
500 TABLET, EXTENDED RELEASE 24 HR ORAL 2 TIMES DAILY WITH MEALS
Qty: 180 TABLET | Refills: 0 | Status: SHIPPED | OUTPATIENT
Start: 2019-03-07 | End: 2019-06-06

## 2019-03-07 RX ORDER — SIMVASTATIN 20 MG
20 TABLET ORAL AT BEDTIME
Qty: 90 TABLET | Refills: 3 | Status: SHIPPED | OUTPATIENT
Start: 2019-03-07 | End: 2020-03-26

## 2019-03-07 ASSESSMENT — PAIN SCALES - GENERAL: PAINLEVEL: NO PAIN (0)

## 2019-03-07 ASSESSMENT — MIFFLIN-ST. JEOR: SCORE: 1733.43

## 2019-03-07 NOTE — PATIENT INSTRUCTIONS
Stop amlodipine for blood pressure.    Start metformin 500 mg twice daily for diabetes.    Start simvastatin 20 mg at bedtime for cholesterol.    Start aspirin 81 mg daily     Schedule appointment with diabetic education, gastroenterology and endocrinology.    DOMINIK Mendez    No

## 2019-03-09 LAB — PANC ISLET CELL AB TITR SER: NORMAL {TITER}

## 2019-03-11 ENCOUNTER — TELEPHONE (OUTPATIENT)
Dept: FAMILY MEDICINE | Facility: OTHER | Age: 59
End: 2019-03-11

## 2019-03-11 DIAGNOSIS — E11.65 UNCONTROLLED TYPE 2 DIABETES MELLITUS WITH HYPERGLYCEMIA (H): Primary | ICD-10-CM

## 2019-03-11 DIAGNOSIS — I10 UNCONTROLLED HYPERTENSION: ICD-10-CM

## 2019-03-11 RX ORDER — AMLODIPINE BESYLATE 2.5 MG/1
2.5 TABLET ORAL DAILY
Qty: 30 TABLET | Refills: 0 | OUTPATIENT
Start: 2019-03-11

## 2019-03-11 NOTE — TELEPHONE ENCOUNTER
Per fax from pharmacy a PRIOR AUTHORIZATION is needed for LEVEMIR FLEXTOUCH 100 unit, please advise if you would like a PA started, or change the medication.   Thank you  Bettina TROY (R)       [facility]#[phone]  ID#[id number]  BIN [id number] [address]

## 2019-03-14 ENCOUNTER — TELEPHONE (OUTPATIENT)
Dept: FAMILY MEDICINE | Facility: OTHER | Age: 59
End: 2019-03-14

## 2019-03-14 NOTE — TELEPHONE ENCOUNTER
Bettina Patrick RT (R) contacted Zackery on 03/14/19 and left a message. If patient calls back please inform patient of results below, thanks    Notes recorded by Kalina Poe, DINH CNP on 3/13/2019 at 10:02 PM CDT  Please call Gustavo and let him know the other blood tests came back and it does not appear that his diabetes is type 1 which is good news as that is the type of diabetes that is harder to control. I still want him to set up an appointment with diabetic education to get teaching on starting insulin once a day. I am working on finding out which insulin will be covered by his insurance.   Kalina Poe, PITERC

## 2019-03-14 NOTE — TELEPHONE ENCOUNTER
Diabetes Education Scheduling Outreach #1:    Call to patient to schedule. Left message with phone number to call to schedule.    Plan for 2nd outreach attempt within 1 week.    Pia Danielle OnCall  Diabetes and Nutrition Scheduling

## 2019-03-19 NOTE — TELEPHONE ENCOUNTER
CENTRAL PRIOR AUTHORIZATION  [phone]    PA Initiation    Medication: Levemir Flextouch 100 Unit  Insurance Company: [employer] - Phone [phone] Fax [phone]  Pharmacy Filling the Rx: [id number] - [place], [place] - [address] [place] [address]  Filling Pharmacy Phone: [phone]  Filling Pharmacy Fax:    Start Date: 3/19/2019

## 2019-03-20 NOTE — TELEPHONE ENCOUNTER
PA has been DENIED, patient must have tried and failed formulary alternative Basaglar Kwikpen U-100,   Please advise if there is any other supporting information as to why patient can't take an alternative medication.   Thanks  Bettina Patrick RT (R)       Denial sent to scanning

## 2019-03-22 NOTE — TELEPHONE ENCOUNTER
PRIOR AUTHORIZATION DENIED    Medication: Levemir Flextouch 100 Unit- DENIED 03/20/2019    Denial Date: 3/20/2019    Denial Rational: THE PATIENT HAS TO TRY AND FAIL BASAGLAR KWIKPEN U-100 BEFORE PROCEEDING TO THE REQUESTED MEDICATION.         Appeal Information: IF THE PROVIDER WOULD LIKE APPEAL THIS DENIAL, PLEASE HAVE THEM PROVIDE A LETTER OF MEDICAL NECESSITY ALONG WITH ANY DOCUMENTATION THAT STATES THERAPIES TRIED/OUTCOMES. ONCE IT HAS BEEN PLACED IN THE PATIENT'S CHART, PLEASE NOTIFY THE PA TEAM ONCE IT HAS BEEN COMPLETED AND WE CAN INITIATE THE APPEAL ON BEHALF OF THE PROVIDER AND PATIENT.

## 2019-03-26 NOTE — TELEPHONE ENCOUNTER
Diabetes Education Scheduling Outreach #2:    Call to patient to schedule. Left message with phone number to call to schedule.    Letter sent to patient requesting to call to schedule.    Pia Danielle OnCall  Diabetes and Nutrition Scheduling

## 2019-03-30 DIAGNOSIS — I10 UNCONTROLLED HYPERTENSION: ICD-10-CM

## 2019-04-01 ENCOUNTER — TELEPHONE (OUTPATIENT)
Dept: FAMILY MEDICINE | Facility: OTHER | Age: 59
End: 2019-04-01

## 2019-04-01 NOTE — TELEPHONE ENCOUNTER
Lisinopril  Routing refill request to provider for review/approval because:  Appears to be a break in medication - should have been out around 1/8/19    Kennedi Tavarez, RN, BSN

## 2019-04-01 NOTE — TELEPHONE ENCOUNTER
You placed a referral for patient to endocrinology on 3/7/19.  Patient has not scheduled as of yet.      Please review and forward to team if follow up with the patient is needed.     Thank you!  Ne/Clinic Referrals Dyad II

## 2019-04-02 RX ORDER — LISINOPRIL 40 MG/1
40 TABLET ORAL DAILY
Qty: 90 TABLET | Refills: 0 | Status: SHIPPED | OUTPATIENT
Start: 2019-04-02 | End: 2019-06-06

## 2019-04-04 RX ORDER — INSULIN GLARGINE 100 [IU]/ML
INJECTION, SOLUTION SUBCUTANEOUS
Qty: 9 ML | Refills: 0 | Status: SHIPPED | OUTPATIENT
Start: 2019-04-04 | End: 2019-05-03

## 2019-04-05 ENCOUNTER — TELEPHONE (OUTPATIENT)
Dept: FAMILY MEDICINE | Facility: OTHER | Age: 59
End: 2019-04-05

## 2019-04-05 NOTE — TELEPHONE ENCOUNTER
LMTC, please inform patient that I have left a message with Diabetic Ed [phone] to give patient a call  Thanks  Bettina Patrick RT (R)

## 2019-04-05 NOTE — TELEPHONE ENCOUNTER
Reason for Call:  Other call back and returning call    Detailed comments: Patient called clinic back. I relayed message from below. He says he hasn't started the new insulin yet. He roneli stated he has been trying to schedule with Diabetes Ed by calling the phone number and hasn't gotten a call back yet. He's been trying for a couple weeks with no success so he contacted [facility] and they are not able to get him in with a Diabetic Educator until mid- May. Please contact patient if there is another way he can get ahold of diabetes ed here for [place].     Phone Number Patient can be reached at: Home number on file [phone] (home)    Best Time: any    Can we leave a detailed message on this number? YES    Call taken on 4/5/2019 at 1:26 PM by Kristen Rodriguez

## 2019-04-05 NOTE — TELEPHONE ENCOUNTER
Order for Basaglar sent as this is what insurance will cover for long-acting insulin. Please call patient to schedule diabetic education appointment for teaching on insulin.  Thanks,  Fallon

## 2019-04-05 NOTE — TELEPHONE ENCOUNTER
Reason for Call:  Medication or medication refill:    Do you use a [facility]?  Name of the pharmacy and phone number for the current request:  [facility] [phone]    Name of the medication requested: basaglar    Other request: pharmacy calling with question about dosing. Insurance will requires a max daily dose    Can we leave a detailed message on this number? YES    Phone number patient can be reached at: Home number on file [phone] (home)    Best Time: any    Call taken on 4/5/2019 at 12:39 PM by Britt Castanon

## 2019-04-05 NOTE — TELEPHONE ENCOUNTER
LMTC, please inform patient that Prior Authorization for Levemir has been denied, a new Rx has been sent for Basaglar to [facility], and Fallon Poe has placed a referral to diabetic education, please have him call to schedule     Your provider has referred you to Diabetes Education: [facility]: Diabetes Education - All [facility] [phone]   [email or web address]    Thanks  Bettina Patrick RT (R)

## 2019-04-08 NOTE — TELEPHONE ENCOUNTER
Spoke to patient and relayed message. Patient states he is still interested in scheduling with GI and Endocrinology. Transferred patient to scheduling for GI

## 2019-04-08 NOTE — TELEPHONE ENCOUNTER
Spoke with and he stated he has not heard anything yet but states he knew they were going to call him. Will close.    Clarisse Valdez CMA ([facility])

## 2019-04-10 ENCOUNTER — TELEPHONE (OUTPATIENT)
Dept: FAMILY MEDICINE | Facility: OTHER | Age: 59
End: 2019-04-10

## 2019-04-10 NOTE — TELEPHONE ENCOUNTER
Clinic transferred pt to us. Per pt we had not reached out to him.  We have documented  2 attempts to  reach the pt.    When pt was advised to verify insurance he became angry and disconnected the call.    appt is still scheduled.     [facility] Specialist  Diabetes & Nutrition Scheduling  [address] [place] [address] [phone]

## 2019-04-10 NOTE — TELEPHONE ENCOUNTER
Left message for patient to return call. Please assist patient in scheduling appointment when he returns call.

## 2019-04-10 NOTE — TELEPHONE ENCOUNTER
Reason for Call:  Same Day Appointment, Requested Provider:  anyone    PCP: Kalina Poe    Reason for visit: kidney or back pain     Duration of symptoms: one day    Have you been treated for this in the past? No    Additional comments: Patient has been recently diagnosed  Diabetic, so he is concerned it could be related to that.   Can we leave a detailed message on this number? NO    Phone number patient can be reached at: [phone]     Best Time:     Call taken on 4/10/2019 at 1:27 PM by Michelle Mak

## 2019-04-12 NOTE — TELEPHONE ENCOUNTER
Spoke to pharmacy in regards to initial message, they asked to disregard as Rx has been filled and ready to go for patient .

## 2019-04-12 NOTE — TELEPHONE ENCOUNTER
After review of the record and what appears to be a failed appointment on 11 April 2019 I would recommend that the patient make a follow-up appointment to discuss this further with a primary care provider of his choosing.  Electronically signed:    Marek Tadeo PA-C

## 2019-04-18 ENCOUNTER — ALLIED HEALTH/NURSE VISIT (OUTPATIENT)
Dept: EDUCATION SERVICES | Facility: OTHER | Age: 59
End: 2019-04-18
Payer: COMMERCIAL

## 2019-04-18 VITALS — BODY MASS INDEX: 28.87 KG/M2 | WEIGHT: 205.8 LBS

## 2019-04-18 DIAGNOSIS — E11.9 TYPE 2 DIABETES MELLITUS WITHOUT COMPLICATION, WITHOUT LONG-TERM CURRENT USE OF INSULIN (H): Primary | ICD-10-CM

## 2019-04-18 PROCEDURE — G0108 DIAB MANAGE TRN  PER INDIV: HCPCS

## 2019-04-18 NOTE — PROGRESS NOTES
"Diabetes Self-Management Education & Support    Diabetes Education Self Management & Training    SUBJECTIVE/OBJECTIVE:  Accompanied by: Self  Diabetes education in the past 24 mo: No  Focus of Visit: Patient Unsure  Diabetes type: Type 2  Disease course: Improving  How confident are you filling out medical forms by yourself:: Not Assessed  Transportation concerns: No  Other concerns:: None  Cultural Influences/Ethnic Background:  American      Diabetes Symptoms & Complications  Blurred vision: No  Fatigue: Yes  Neuropathy: No  Foot ulcerations: No  Polydipsia: No  Polyphagia: No  Polyuria: Yes  Visual change: Yes  Weight loss: No  Symptom course: Improving  Weight trend: Stable       Patient Problem List and Family Medical History reviewed for relevant medical history, current medical status, and diabetes risk factors.    Vitals:  Wt 93.4 kg (205 lb 12.8 oz)   BMI 28.87 kg/m    Estimated body mass index is 28.87 kg/m  as calculated from the following:    Height as of 3/7/19: 1.798 m (5' 10.8\").    Weight as of this encounter: 93.4 kg (205 lb 12.8 oz).   Last 3 BP:   BP Readings from Last 3 Encounters:   03/07/19 128/74   10/05/18 110/78   10/01/18 (!) 116/94       History   Smoking Status     Never Smoker   Smokeless Tobacco     Never Used       Labs:  Lab Results   Component Value Date    A1C 12.7 03/07/2019     Lab Results   Component Value Date     02/28/2019     Lab Results   Component Value Date     02/28/2019     HDL Cholesterol   Date Value Ref Range Status   02/28/2019 39 (L) >39 mg/dL Final   ]  GFR Estimate   Date Value Ref Range Status   02/28/2019 >90 >60 mL/min/[1.73_m2] Final     Comment:     Non  GFR Calc  Starting 12/18/2018, serum creatinine based estimated GFR (eGFR) will be   calculated using the Chronic Kidney Disease Epidemiology Collaboration   (CKD-EPI) equation.       GFR Estimate If Black   Date Value Ref Range Status   02/28/2019 >90 >60 mL/min/[1.73_m2] Final "     Comment:      GFR Calc  Starting 12/18/2018, serum creatinine based estimated GFR (eGFR) will be   calculated using the Chronic Kidney Disease Epidemiology Collaboration   (CKD-EPI) equation.       Lab Results   Component Value Date    CR 0.84 02/28/2019     No results found for: MICROALBUMIN    Healthy Eating  Cultural/Sikhism diet restrictions?: No  Meal planning:  Smaller portions  Breakfast: diet or regular pop in the morning -2 bottles  Lunch: 12 noon-[employer] hamburger, fries, drank regular pop  Dinner: 6:30 pm- pork steak, pop,   Snacks: chips or licorice or apples or raspberries  Beverages: Water, Diet soda, Soda, Alcohol (rare Etoh)  Has patient met with a dietitian in the past?: No    Being Active  Being Active Assessed Today: No  Exercise:: Currently not exercising (physical job for 10 hours per day.)  Barrier to exercise: None    Monitoring   instructed today    Taking Medications  Diabetes Medication(s)     Biguanides       metFORMIN (GLUCOPHAGE-XR) 500 MG 24 hr tablet    Take 1 tablet (500 mg) by mouth 2 times daily (with meals)    Insulin       insulin detemir (LEVEMIR PEN) 100 UNIT/ML pen    Inject 10 Units Subcutaneous At Bedtime Increase dose by 2 units every 3 days until morning fasting blood sugar is 130 or less     Patient not taking:  Reported on 4/18/2019     insulin glargine (BASAGLAR KWIKPEN) 100 UNIT/ML pen    Inject 10 Units Subcutaneous At Bedtime Increase dose by 2 units every 3 days until morning fasting blood sugar is 130 or less     Patient not taking:  Reported on 4/18/2019        Pt has not yet started the insulin.       Patient Activation Measure Survey Score:  DUSTIN Score (Last Two) 1/13/2016   DUSTIN Raw Score 52   Activation Score 100   DUSTIN Level 4       ASSESSMENT:  Pt here and newly diagnosed with T2 diabetes.  He feels strongly that one or the other of the blood pressure medications that he was on brought on his diabetes.  Pt drinks large amounts of sugared  soda.  He states that he has stopped drinking this before and feels that he can do it again.  Pt states he is eating smaller portions of food than he has in the past and has gained back 15 lbs of weight from when he was first diagnosed.  Pt states that he is feeling better since starting the metformin and is tolerating the medication.  Pt has many questions and comments and we moved thru education material slowly.  Pt has not yet started the insulin that was ordered as he was waiting for this appointment to learn more about it.  Recommend that pt work to eliminate regular soda from his diet and test blood glucose levels 2 times daily. He should bring those results into his next visit in 2 weeks for review. Hold start of insulin until then.      Goals Addressed as of 4/18/2019 at 6:18 PM                 Today      Healthy Eating (pt-stated)   0%    Added 4/18/19 by Ester Rodriguez RN     My Goal: I will decrease my intake of regular soda and work toward drinking diet soda or water.      What I need to meet my goal: Motivation to improve my health and diabetes control.    I plan to meet my goal by this date: 2 weeks            Patient's most recent   Lab Results   Component Value Date    A1C 12.7 03/07/2019    is not meeting goal of <7.0    INTERVENTION:   Diabetes knowledge and skills assessment:     Patient is knowledgeable in diabetes management concepts related to: none    Patient needs further education on the following diabetes management concepts: Healthy Eating, Being Active, Monitoring, Taking Medication, Problem Solving, Reducing Risks and Healthy Coping    Based on learning assessment above, most appropriate setting for further diabetes education would be: Group class or Individual setting.    Education provided today on:  AADE Self-Care Behaviors:  Diabetes Pathophysiology  Monitoring: purpose, proper technique, log and interpret results, individual blood glucose targets, frequency of monitoring and  proper sharps disposal  Taking Medication: action of prescribed medication, side effects of prescribed medications and when to take medications  Problem Solving: high blood glucose - causes, signs/symptoms, treatment and prevention  Pt instructed on use of the One Touch Verio Flex meter.  Pt able to do return demonstration.  BG level was 148 mg/dl 3 hr pp.    Opportunities for ongoing education and support in diabetes-self management were discussed.    Pt verbalized understanding of concepts discussed and recommendations provided today.       Education Materials Provided:  [place] Understanding Diabetes Booklet, Safe Disposal Options for Needles & Syringes, BG Log Sheet and One Touch Verio Flex meter kit    PLAN:  See Patient Instructions for co-developed, patient-stated behavior change goals.  AVS printed and provided to patient today. See Follow-Up section for recommended follow-up.  Next visit instruct pt on healthy eating and available group classes or 1:1 education.  Evaluate blood glucose levels and start insulin.    Ester Rodriguez RN  BSN CDE    Time Spent: 75 minutes  Encounter Type: Individual    Any diabetes medication dose changes were made via the CDE Protocol and Collaborative Practice Agreement with the patient's referring provider. A copy of this encounter was shared with the provider.

## 2019-05-03 ENCOUNTER — ALLIED HEALTH/NURSE VISIT (OUTPATIENT)
Dept: EDUCATION SERVICES | Facility: OTHER | Age: 59
End: 2019-05-03
Payer: COMMERCIAL

## 2019-05-03 VITALS — BODY MASS INDEX: 28.75 KG/M2 | WEIGHT: 205 LBS

## 2019-05-03 DIAGNOSIS — E11.65 UNCONTROLLED TYPE 2 DIABETES MELLITUS WITH HYPERGLYCEMIA (H): ICD-10-CM

## 2019-05-03 DIAGNOSIS — Z29.9 PREVENTIVE MEASURE: ICD-10-CM

## 2019-05-03 PROCEDURE — G0108 DIAB MANAGE TRN  PER INDIV: HCPCS | Performed by: FAMILY MEDICINE

## 2019-05-03 RX ORDER — INSULIN GLARGINE 100 [IU]/ML
INJECTION, SOLUTION SUBCUTANEOUS
Qty: 9 ML | Refills: 0 | Status: SHIPPED | OUTPATIENT
Start: 2019-05-03 | End: 2019-06-27

## 2019-05-03 NOTE — PATIENT INSTRUCTIONS
Recommend to eat 3 meals per day and a snack if desired.  Recommend to eat 4-5 carb choices for a meal and 0-2 choices for a snack.  Carry sugar with you at all times.  Call if questions.

## 2019-05-03 NOTE — PROGRESS NOTES
"Diabetes Self-Management Education & Support    Diabetes Education Self Management & Training    SUBJECTIVE/OBJECTIVE:  Accompanied by: Self, Spouse  Diabetes education in the past 24 mo: (P) Yes  Focus of Visit: Patient Unsure  Diabetes type: (P) Type 2  Disease course: (P) Improving  Other concerns:: None  Cultural Influences/Ethnic Background:  American      Diabetes Symptoms & Complications  Blurred vision: (P) No  Fatigue: (P) Yes  Foot ulcerations: (P) No  Polydipsia: (P) No  Polyphagia: (P) No  Polyuria: (P) Yes  Visual change: (P) Yes  Weakness: (P) No  Weight loss: (P) No  Slow healing wounds: (P) No  Weight trend: (P) Stable  Autonomic neuropathy: (P) No  CVA: (P) No  Heart disease: (P) No  Nephropathy: (P) No  Peripheral neuropathy: (P) No  Peripheral Vascular Disease: (P) No  Retinopathy: (P) No  Sexual dysfunction: (P) No    Patient Problem List and Family Medical History reviewed for relevant medical history, current medical status, and diabetes risk factors.    Vitals:  Wt 93 kg (205 lb)   BMI 28.75 kg/m    Estimated body mass index is 28.75 kg/m  as calculated from the following:    Height as of 3/7/19: 1.798 m (5' 10.8\").    Weight as of this encounter: 93 kg (205 lb).   Last 3 BP:   BP Readings from Last 3 Encounters:   03/07/19 128/74   10/05/18 110/78   10/01/18 (!) 116/94       History   Smoking Status     Never Smoker   Smokeless Tobacco     Never Used       Labs:  Lab Results   Component Value Date    A1C 12.7 03/07/2019     Lab Results   Component Value Date     02/28/2019     Lab Results   Component Value Date     02/28/2019     HDL Cholesterol   Date Value Ref Range Status   02/28/2019 39 (L) >39 mg/dL Final   ]  GFR Estimate   Date Value Ref Range Status   02/28/2019 >90 >60 mL/min/[1.73_m2] Final     Comment:     Non  GFR Calc  Starting 12/18/2018, serum creatinine based estimated GFR (eGFR) will be   calculated using the Chronic Kidney Disease Epidemiology " Collaboration   (CKD-EPI) equation.       GFR Estimate If Black   Date Value Ref Range Status   02/28/2019 >90 >60 mL/min/[1.73_m2] Final     Comment:      GFR Calc  Starting 12/18/2018, serum creatinine based estimated GFR (eGFR) will be   calculated using the Chronic Kidney Disease Epidemiology Collaboration   (CKD-EPI) equation.       Lab Results   Component Value Date    CR 0.84 02/28/2019     No results found for: MICROALBUMIN    Healthy Eating  Cultural/Alevism diet restrictions?: (P) No  Meal planning: (P) None  Breakfast:  6-7 am-PB 2-4 toast and milk 12 oz  Lunch: 12:30 pm-  out for fast food    3 taco/ burrito or hamburger with fries or pizza 2 slices, drinks diet Coke  Dinner: 6:30 pm- 2 pieces of pizza, [employer]  Snacks: 7 pm- fruit or chips  Beverages: Diet soda, Water  Has patient met with a dietitian in the past?: No    Being Active  Barrier to exercise: Time (active at work)    Monitoring  Monitoring Assessed Today: Yes  Did patient bring glucose meter to appointment? : No  Blood Glucose Meter: One Touch(Verio)  Home Glucose (Sugar) Monitoring: (P) 1-2 times per day  Blood glucose trend: (P) No change  Low Glucose Range (mg/dL): (P) 110-130  High Glucose Range (mg/dL): (P) 180-200  Overall Range (mg/dL): (P) 140-180                            Before    After       Before    After      Before    After       HS      Taking Medications  Diabetes Medication(s)     Biguanides       metFORMIN (GLUCOPHAGE-XR) 500 MG 24 hr tablet    Take 1 tablet (500 mg) by mouth 2 times daily (with meals)    Insulin       insulin detemir (LEVEMIR PEN) 100 UNIT/ML pen    Inject 10 Units Subcutaneous At Bedtime Increase dose by 2 units every 3 days until morning fasting blood sugar is 130 or less     Patient not taking:  Reported on 4/18/2019     insulin glargine (BASAGLAR KWIKPEN) 100 UNIT/ML pen    Inject 10 Units Subcutaneous At Bedtime Increase dose by 2 units every 3 days until morning fasting  blood sugar is 130 or less     Patient not taking:  Reported on 4/18/2019          Current Treatments: Diet, Oral Agent (monotherapy)  Diabetes medication side effects?: No  Treatment Compliance: Most of the time    Problem Solving  Hypoglycemia Frequency: (P) Never  Medical alert: (P) No  Severe weather/disaster plan for diabetes management?: (P) No  DKA prevention plan?: (P) No  Sick day plan for diabetes management?: (P) No    Reducing Risks  Has dilated eye exam at least once a year?: (P) Yes  Sees dentist every 6 months?: (P) No  Sees podiatrist (foot doctor)?: (P) No    Healthy Coping  Emotional response to diabetes: Ready to learn  Informal Support system:: (P) None  Difficulty affording diabetes management supplies?: (P) Yes  Patient Activation Measure Survey Score:  DUSTIN Score (Last Two) 1/13/2016   DUSTIN Raw Score 52   Activation Score 100   DUSTIN Level 4       ASSESSMENT:  Pt continues to be challenged with dealing with his diagnosis.  He moves thru material slowly.  He has stopped drinking regular soda and blood glucose levels are more reasonable but still above goal range.   Patient concerned about hypoglycemia with insulin start.      Recommend insulin start with lower starting dose and titrate dose up as prescribed.  Pt was instructed on use of insulin today as well as healthy eating.  Wife supportive.        Goals Addressed as of 5/3/2019 at 2:54 PM                 Today    4/18/19      Healthy Eating (pt-stated)   100%  0%    Added 4/18/19 by Ester Rodriguez RN     My Goal: I will decrease my intake of regular soda and work toward drinking diet soda or water.      What I need to meet my goal: Motivation to improve my health and diabetes control.    I plan to meet my goal by this date: 2 weeks            Patient's most recent   Lab Results   Component Value Date    A1C 12.7 03/07/2019    is not meeting goal of <7.0    INTERVENTION:   Diabetes knowledge and skills assessment:     Patient is knowledgeable  in diabetes management concepts related to: Monitoring and Taking Medication    Patient needs further education on the following diabetes management concepts: Healthy Eating, Being Active, Monitoring, Taking Medication, Problem Solving, Reducing Risks and Healthy Coping    Based on learning assessment above, most appropriate setting for further diabetes education would be:  Individual setting.    Education provided today on:  AADE Self-Care Behaviors:  Healthy Eating: carbohydrate counting, consistency in amount, composition, and timing of food intake, portion control, plate planning method and label reading  Monitoring: log and interpret results, individual blood glucose targets and frequency of monitoring  Taking Medication: action of prescribed medication, drawing up, administering and storing injectable diabetes medications, proper site selection and rotation for injections, side effects of prescribed medications and when to take medications.  Patient was able to do a practice injection into his abd with out difficulty.  Problem Solving: high blood glucose - causes, signs/symptoms, treatment and prevention, low blood glucose - causes, signs/symptoms, treatment and prevention and carrying a carbohydrate source at all times    Opportunities for ongoing education and support in diabetes-self management were discussed.    Pt verbalized understanding of concepts discussed and recommendations provided today.       Education Materials Provided:  Carbohydrate Counting,  and Symptoms and My Plate Planner  Mailed to patient: HAYES Treating Hypoglycemia    PLAN:  See Patient Instructions for co-developed, patient-stated behavior change goals.  AVS printed and provided to patient today. See Follow-Up section for recommended follow-up.    Call out to patient re insulin start at a lower dose  ( 6 units) and review of hypoglycemia symptoms, treatment and prevention, carrying CHO. Next Wed patient to call in blood glucose levels  for review or sooner if concerns.  Patient states he will start the insulin tomorrow night and verbalized understanding of information given to him.    Ester Rodriguez RN  BSN CDE    Time Spent: 60 minutes  Encounter Type: Individual    Any diabetes medication dose changes were made via the CDE Protocol and Collaborative Practice Agreement with the patient's referring provider.

## 2019-05-07 ENCOUNTER — PATIENT OUTREACH (OUTPATIENT)
Dept: EDUCATION SERVICES | Facility: CLINIC | Age: 59
End: 2019-05-07
Payer: COMMERCIAL

## 2019-05-07 NOTE — PROGRESS NOTES
CDE assessment:   Wt Readings from Last 1 Encounters:   05/03/19 93 kg (205 lb)     0.2u/kg x 93 = 19 units    Recommend initial starting TDD max of 20 units which would allow for 2 units titrations as recommended for the next 20 days.    Called pharmacy and gave max estimated dose.  Prescription will be filled today.  Called patient and informed him of reasoning and that he could  insulin today.  Recommended calling in blood sugars Friday if he isn't seeing any lower numbers after the first 3 doses OR to wait until next Wednesday (1 full week) after his first dose if he is noticing improvement in his readings.    Patient verbalized understanding.     Kenya Mascorro MS, RD, LD, CDE

## 2019-05-07 NOTE — PROGRESS NOTES
[facility] pharmacy needs the maximum dose of basaglar in order to fill script and bill insurance.

## 2019-05-09 ENCOUNTER — PATIENT OUTREACH (OUTPATIENT)
Dept: EDUCATION SERVICES | Facility: CLINIC | Age: 59
End: 2019-05-09
Payer: COMMERCIAL

## 2019-05-09 DIAGNOSIS — E11.9 TYPE 2 DIABETES MELLITUS WITHOUT COMPLICATION, WITHOUT LONG-TERM CURRENT USE OF INSULIN (H): Primary | ICD-10-CM

## 2019-05-09 NOTE — PROGRESS NOTES
Pt has started insulin but spouse is thinking he needs to stop taking metformin.    CDE called Zackery back:  Clarified- continue the Metformin dose going and add the insulin to it.   He started with 6 units past 2 nights and had fasting BG 98 today. Some concern about lows with physical job.   But, aware of sx & tx of low.     Voices understanding that it will take ~ 3 days of insulin to see how much it can help.   He voices intention to send in readings on Monday or Tuesday next week.     Diann Sandoval RD, LD, CDE

## 2019-06-06 DIAGNOSIS — I10 UNCONTROLLED HYPERTENSION: ICD-10-CM

## 2019-06-06 DIAGNOSIS — E11.9 NEW ONSET TYPE 2 DIABETES MELLITUS (H): ICD-10-CM

## 2019-06-06 RX ORDER — METFORMIN HCL 500 MG
TABLET, EXTENDED RELEASE 24 HR ORAL
Qty: 180 TABLET | Refills: 0 | Status: SHIPPED | OUTPATIENT
Start: 2019-06-06 | End: 2019-06-27

## 2019-06-06 RX ORDER — LISINOPRIL 40 MG/1
40 TABLET ORAL DAILY
Qty: 90 TABLET | Refills: 0 | Status: SHIPPED | OUTPATIENT
Start: 2019-06-06 | End: 2019-11-12

## 2019-06-06 NOTE — TELEPHONE ENCOUNTER
Prescription approved per [facility] Refill Protocol.    John Flynn, RN, BSN

## 2019-06-13 ENCOUNTER — OFFICE VISIT (OUTPATIENT)
Dept: GASTROENTEROLOGY | Facility: CLINIC | Age: 59
End: 2019-06-13
Payer: COMMERCIAL

## 2019-06-13 VITALS
WEIGHT: 209 LBS | DIASTOLIC BLOOD PRESSURE: 88 MMHG | HEART RATE: 77 BPM | HEIGHT: 71 IN | OXYGEN SATURATION: 97 % | SYSTOLIC BLOOD PRESSURE: 130 MMHG | BODY MASS INDEX: 29.26 KG/M2

## 2019-06-13 DIAGNOSIS — K85.91 NECROTIZING PANCREATITIS: ICD-10-CM

## 2019-06-13 DIAGNOSIS — K85.91 NECROTIZING PANCREATITIS: Primary | ICD-10-CM

## 2019-06-13 LAB
ALBUMIN SERPL-MCNC: 3.6 G/DL (ref 3.4–5)
ALP SERPL-CCNC: 105 U/L (ref 40–150)
ALT SERPL W P-5'-P-CCNC: 30 U/L (ref 0–70)
ANION GAP SERPL CALCULATED.3IONS-SCNC: 4 MMOL/L (ref 3–14)
AST SERPL W P-5'-P-CCNC: 17 U/L (ref 0–45)
BILIRUB SERPL-MCNC: 0.4 MG/DL (ref 0.2–1.3)
BUN SERPL-MCNC: 13 MG/DL (ref 7–30)
CALCIUM SERPL-MCNC: 8.6 MG/DL (ref 8.5–10.1)
CHLORIDE SERPL-SCNC: 108 MMOL/L (ref 94–109)
CO2 SERPL-SCNC: 27 MMOL/L (ref 20–32)
CREAT SERPL-MCNC: 0.75 MG/DL (ref 0.66–1.25)
FERRITIN SERPL-MCNC: 343 NG/ML (ref 26–388)
FOLATE SERPL-MCNC: 17 NG/ML
GFR SERPL CREATININE-BSD FRML MDRD: >90 ML/MIN/{1.73_M2}
GLUCOSE SERPL-MCNC: 109 MG/DL (ref 70–99)
IRON SERPL-MCNC: 96 UG/DL (ref 35–180)
MAGNESIUM SERPL-MCNC: 2.2 MG/DL (ref 1.6–2.3)
PHOSPHATE SERPL-MCNC: 2.4 MG/DL (ref 2.5–4.5)
POTASSIUM SERPL-SCNC: 4.2 MMOL/L (ref 3.4–5.3)
PREALB SERPL IA-MCNC: 21 MG/DL (ref 15–45)
PROT SERPL-MCNC: 6.1 G/DL (ref 6.8–8.8)
SODIUM SERPL-SCNC: 140 MMOL/L (ref 133–144)
VIT B12 SERPL-MCNC: 624 PG/ML (ref 193–986)

## 2019-06-13 ASSESSMENT — PAIN SCALES - GENERAL: PAINLEVEL: NO PAIN (0)

## 2019-06-13 ASSESSMENT — MIFFLIN-ST. JEOR: SCORE: 1781.97

## 2019-06-13 NOTE — LETTER
6/13/2019       RE: Zackery Rogers  [address] [facility] [address]     Dear Colleague,    Thank you for referring your patient, Zackery Rogers, to the [facility]CRE[facility] at [facility]. Please see a copy of my visit note below.    REFERRING PHYSICIAN:  Kalina Poe CNP.      REASON FOR CONSULTATION:  Necrotizing pancreatitis.      CHIEF COMPLAINT:  Loss of weight.      HISTORY OF PRESENT ILLNESS:    This is a very pleasant 58-year-old white gentleman with a past medical history significant for hypertension, colitis, previous episode of idiopathic acute pancreatitis in 2015, who was referred to the Panc/Bili Clinic in 03/2017 for a new peripancreatic necrotic collection.  The patient was apparently admitted to [facility] on 03/30/2015 for nearly 2 weeks.  He reports that he was not in ICU and did not have any end-organ failure and he recovered in 2 weeks; however, his necrotic collections became symptomatic and he had abdominal discomfort and fullness which was aggravated with eating and was also having intermittent night sweats and a 20-pound weight loss and failure to thrive.  He underwent endoscopic ultrasound-guided cystogastrostomy with placement of 15 mm Axios stent for endoscopic transluminal drainage with an interval CT showing a decrease in necrotic collection.  He underwent 1 session of endoscopic necrosectomy where a copious amount of purulent material was drained and sent for culture and sensitivity that was positive for Candida.  He was treated for fungal infection with fluconazole in addition to Levaquin and was asymptomatic and was seen in followup in 07/2017.  Since the patient was asymptomatic at that point, we had him followup in 6 months with me with the CT scan; however, since the patient was doing very well he did not follow up with me and he continues to do well except that he had a nearly 50-pound weight loss over a  4-month period in 03/2019.  He attributes this weight loss to his new job and also Amlodipine, which was started for poorly controlled hypertension around 3-4 months back, around the same time.  He was also diagnosed to have new onset diabetes in 03/2019 and is currently being started on insulin for the past 1-1/2 months.  The patient reports that he has gained 10 pounds back (still 40 pounds deficit from earlier this year) and has not lost weight for the last 2-3 months.  He did have a CT scan on 03/05/2019 which showed an atrophied pancreatic neck and body with a cystic lesion measuring 1.8 cm at the tail with upstream most portion of the pancreatic tail remaining present.  The cystic lesion appeared to be a well-formed followup of necrosis versus focal dilation and no arterially enhancing masses were seen throughout the pancreas.  He continues to deny fevers, chills, night sweats.  Of note, the two 10-[place] citizen x 3 cm double pigtail Solus stents were no longer seen across the cystogastrostomy tract on the most recent CT.  He, however, continues to remain afebrile.  He denies night sweats, fevers or chills.  He denies nausea or vomiting, passage of colored stools, diarrhea or constipation.  Has a regular 1 bowel movement every day and reports well-formed stools.  He denies passage of dark-colored urine or jai colored stools.  He denies dysphagia or odynophagia.  Of note, he has had this for screening colonoscopy in 04/2018, which was normal and is due for surveillance in 5 years from now.      PAST MEDICAL HISTORY:   1.  Hypertension.   2.  Rosacea.   3.  Colitis.   4.  New onset diabetes.   5.  Acute necrotizing pancreatitis.      PAST SURGICAL HISTORY:  EUS guided cystogastrostomy.      FAMILY HISTORY:  No family history of pancreatitis or pancreatic cancer in the family.      SOCIAL HISTORY:  History of drinking in the past 2-5 years back.  Never a heavy drinker, according to his wife, but has been arrested under  influence as a teenager.  No history of smoking, no history of IV drug abuse.  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      REVIEW OF SYSTEMS:  A complete 10-point review of system was noted to be negative except as above.      PHYSICAL EXAMINATION:   VITAL SIGNS:  Weight 94.8, blood pressure 130/88, heart rate of 77.   GENERAL:  He is not in acute distress.   HEAD AND NECK:  No pallor, no icterus.   ABDOMEN:  Soft, obese.       IMAGING:  CT scan from 03/05/2019 shows:   1.  Sequelae of prior necrotizing pancreatitis with atrophy of the pancreatic neck and body and a cystic structure adjacent to the remaining tail that is likely a more well-formed walled off necrosis with  focal ductal dilation.  No pancreatic masses are identified.     2.  Sequelae of prior granulomatous disease.      ASSESSMENT AND PLAN:  This is a 58-year-old white gentleman with history of hypertension, rosacea, colitis, episode of idiopathic pancreatitis (x2), leading to most recently in 03/2017, needing 2 weeks of hospitalization and complicated by a walled off peripancreatic necrotic collection which became infected and was referred here with weight loss and failure to thrive.  He underwent EUS guided cystogastrostomy with placement of a 15 mm Axios stent and 1 session of endoscopic transluminal necrosectomy.  Followup scans showed near resolution of collection, was asymptomatic and lost to followup.  Currently the patient is being followed for new onset diabetes 4 months ago and unintentional weight loss of 50 pounds over 4 months.  The following are our recommendations.   1.  Discussed in detail the natural course of necrotizing pancreatitis and although he was completely managed with a minimally invasive endoscopic transluminal drainage and necrosectomy there is still a risk of long-term sequelae such as exocrine insufficiency and endocrine insufficiency.  His new onset diabetes is likely type 3C diabetes from the prior attack of necrotizing  pancreatitis.  However, we would like to exclude pancreatic cancer (CT scan 3 months ago was unremarkable, but was not a CT pancreas protocol given new onset diabetes associated with weight loss, his age.  Therefore, we will recommend CT pancreas protocol to evaluate for pancreatic mass or new pancreatic collection since the cystogastrostomy stents have already spontaneously migrated.   2.  Discussed in detail regarding pancreatic enzyme replacement therapy (PERT).  PERT is indicated if he has steatorrhea or if he has chronic pancreatitis on imaging.  However, patient denies steatorrhea and although his pancreas is atrophied and did not see any calcifications, given the expenses involved we will hold off on starting PERT and rather follow up with the patient in 4 months and if he continues to lose weight we will consider the pancreatic enzyme replacement therapy.   3.  We will check a nutrition panel which would include albumin, iron panel, zinc, folic acid, B12, vitamin ADEK, and will supplement micronutrients which are deficient.   4.  He is due for a surveillance colonoscopy in 5 years from the last one (2023).   5.  Followup visit in 4 months will be scheduled with me.      A total of 25 minutes were spent with more than 50% of the time in counseling and formulating a management plan.      cc:  Kalina Poe, CNP     Again, thank you for allowing me to participate in the care of your patient.      Sincerely,    Guru Melanie MD

## 2019-06-13 NOTE — PROGRESS NOTES
REFERRING PHYSICIAN:  Kalina Poe, LAN.      REASON FOR CONSULTATION:  Necrotizing pancreatitis.      CHIEF COMPLAINT:  Loss of weight.      HISTORY OF PRESENT ILLNESS:    This is a very pleasant 58-year-old white gentleman with a past medical history significant for hypertension, colitis, previous episode of idiopathic acute pancreatitis in 2015, who was referred to the Panc/Bili Clinic in 03/2017 for a new peripancreatic necrotic collection.  The patient was apparently admitted to [facility] on 03/30/2015 for nearly 2 weeks.  He reports that he was not in ICU and did not have any end-organ failure and he recovered in 2 weeks; however, his necrotic collections became symptomatic and he had abdominal discomfort and fullness which was aggravated with eating and was also having intermittent night sweats and a 20-pound weight loss and failure to thrive.  He underwent endoscopic ultrasound-guided cystogastrostomy with placement of 15 mm Axios stent for endoscopic transluminal drainage with an interval CT showing a decrease in necrotic collection.  He underwent 1 session of endoscopic necrosectomy where a copious amount of purulent material was drained and sent for culture and sensitivity that was positive for Candida.  He was treated for fungal infection with fluconazole in addition to Levaquin and was asymptomatic and was seen in followup in 07/2017.  Since the patient was asymptomatic at that point, we had him followup in 6 months with me with the CT scan; however, since the patient was doing very well he did not follow up with me and he continues to do well except that he had a nearly 50-pound weight loss over a 4-month period in 03/2019.  He attributes this weight loss to his new job and also Amlodipine, which was started for poorly controlled hypertension around 3-4 months back, around the same time.  He was also diagnosed to have new onset diabetes in 03/2019 and is currently being started on insulin  for the past 1-1/2 months.  The patient reports that he has gained 10 pounds back (still 40 pounds deficit from earlier this year) and has not lost weight for the last 2-3 months.  He did have a CT scan on 03/05/2019 which showed an atrophied pancreatic neck and body with a cystic lesion measuring 1.8 cm at the tail with upstream most portion of the pancreatic tail remaining present.  The cystic lesion appeared to be a well-formed followup of necrosis versus focal dilation and no arterially enhancing masses were seen throughout the pancreas.  He continues to deny fevers, chills, night sweats.  Of note, the two 10-[place] x 3 cm double pigtail Solus stents were no longer seen across the cystogastrostomy tract on the most recent CT.  He, however, continues to remain afebrile.  He denies night sweats, fevers or chills.  He denies nausea or vomiting, passage of colored stools, diarrhea or constipation.  Has a regular 1 bowel movement every day and reports well-formed stools.  He denies passage of dark-colored urine or jai colored stools.  He denies dysphagia or odynophagia.  Of note, he has had this for screening colonoscopy in 04/2018, which was normal and is due for surveillance in 5 years from now.      PAST MEDICAL HISTORY:   1.  Hypertension.   2.  Rosacea.   3.  Colitis.   4.  New onset diabetes.   5.  Acute necrotizing pancreatitis.      PAST SURGICAL HISTORY:  EUS guided cystogastrostomy.      FAMILY HISTORY:  No family history of pancreatitis or pancreatic cancer in the family.      SOCIAL HISTORY:  History of drinking in the past 2-5 years back.  Never a heavy drinker, according to his wife, but has been arrested under influence as a teenager.  No history of smoking, no history of IV drug abuse.  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      REVIEW OF SYSTEMS:  A complete 10-point review of system was noted to be negative except as above.      PHYSICAL EXAMINATION:   VITAL SIGNS:  Weight 94.8, blood pressure  130/88, heart rate of 77.   GENERAL:  He is not in acute distress.   HEAD AND NECK:  No pallor, no icterus.   ABDOMEN:  Soft, obese.       IMAGING:  CT scan from 03/05/2019 shows:   1.  Sequelae of prior necrotizing pancreatitis with atrophy of the pancreatic neck and body and a cystic structure adjacent to the remaining tail that is likely a more well-formed walled off necrosis with  focal ductal dilation.  No pancreatic masses are identified.     2.  Sequelae of prior granulomatous disease.      ASSESSMENT AND PLAN:  This is a 58-year-old white gentleman with history of hypertension, rosacea, colitis, episode of idiopathic pancreatitis (x2), leading to most recently in 03/2017, needing 2 weeks of hospitalization and complicated by a walled off peripancreatic necrotic collection which became infected and was referred here with weight loss and failure to thrive.  He underwent EUS guided cystogastrostomy with placement of a 15 mm Axios stent and 1 session of endoscopic transluminal necrosectomy.  Followup scans showed near resolution of collection, was asymptomatic and lost to followup.  Currently the patient is being followed for new onset diabetes 4 months ago and unintentional weight loss of 50 pounds over 4 months.  The following are our recommendations.   1.  Discussed in detail the natural course of necrotizing pancreatitis and although he was completely managed with a minimally invasive endoscopic transluminal drainage and necrosectomy there is still a risk of long-term sequelae such as exocrine insufficiency and endocrine insufficiency.  His new onset diabetes is likely type 3C diabetes from the prior attack of necrotizing pancreatitis.  However, we would like to exclude pancreatic cancer (CT scan 3 months ago was unremarkable, but was not a CT pancreas protocol given new onset diabetes associated with weight loss, his age.  Therefore, we will recommend CT pancreas protocol to evaluate for pancreatic mass or  new pancreatic collection since the cystogastrostomy stents have already spontaneously migrated.   2.  Discussed in detail regarding pancreatic enzyme replacement therapy (PERT).  PERT is indicated if he has steatorrhea or if he has chronic pancreatitis on imaging.  However, patient denies steatorrhea and although his pancreas is atrophied and did not see any calcifications, given the expenses involved we will hold off on starting PERT and rather follow up with the patient in 4 months and if he continues to lose weight we will consider the pancreatic enzyme replacement therapy.   3.  We will check a nutrition panel which would include albumin, iron panel, zinc, folic acid, B12, vitamin ADEK, and will supplement micronutrients which are deficient.   4.  He is due for a surveillance colonoscopy in 5 years from the last one (2023).   5.  Followup visit in 4 months will be scheduled with me.      A total of 25 minutes were spent with more than 50% of the time in counseling and formulating a management plan.      cc:  Kalina Poe, CNP

## 2019-06-13 NOTE — PATIENT INSTRUCTIONS
1. Go to the LAB, first floor.  The orders are in the system so you just need to tell them your name.  You will receive your results via a phone call or My Chart.  Please let us know if you don't get them.     2. CT: Please call [phone] to get this scheduled.     Follow up: in 4-6 months with Dr. Navarro     Please call with any questions or concerns regarding your clinic visit today.    It is a pleasure being involved in your health care.    Contacts post-consultation depending on your need:    Schedule Clinic Appointments                 [phone] # 1   M-F 7:30 - 5 pm    Maira Trujillo RN Care Coordinator       [phone]  #3  Elaine Victoria LPN                   [phone]  #3     OR Procedure Scheduling                       [phone]    My Chart is available 24 hours a day and is a secure way to access your records and communicate with your care team.  I strongly recommend signing up if you haven't already done so, if you are comfortable with computers.  If you would like to inquire about this or are having problems with My Chart access, you may call [phone] or go online at [email or web address].  Please allow at least 24 hours for a response and extra time on weekends and Holidays.

## 2019-06-13 NOTE — NURSING NOTE
"Chief Complaint   Patient presents with     RECHECK     pancreas cyst       Vitals:    06/13/19 0754   BP: 130/88   Pulse: 77   SpO2: 97%   Weight: 94.8 kg (209 lb)   Height: 1.798 m (5' 10.8\")       Body mass index is 29.31 kg/m .    Dory Abdi CMA    "

## 2019-06-14 LAB — DEPRECATED CALCIDIOL+CALCIFEROL SERPL-MC: 10 UG/L (ref 20–75)

## 2019-06-16 LAB
A-TOCOPHEROL VIT E SERPL-MCNC: 7.4 MG/L (ref 5.5–18)
ANNOTATION COMMENT IMP: NORMAL
BETA+GAMMA TOCOPHEROL SERPL-MCNC: 1.7 MG/L (ref 0–6)
RETINYL PALMITATE SERPL-MCNC: <0.02 MG/L (ref 0–0.1)
VIT A SERPL-MCNC: 0.41 MG/L (ref 0.3–1.2)
ZINC SERPL-MCNC: 55.3 UG/DL (ref 60–120)

## 2019-06-27 ENCOUNTER — ALLIED HEALTH/NURSE VISIT (OUTPATIENT)
Dept: EDUCATION SERVICES | Facility: OTHER | Age: 59
End: 2019-06-27
Payer: COMMERCIAL

## 2019-06-27 VITALS — BODY MASS INDEX: 28.95 KG/M2 | WEIGHT: 206.4 LBS

## 2019-06-27 DIAGNOSIS — E11.65 UNCONTROLLED TYPE 2 DIABETES MELLITUS WITH HYPERGLYCEMIA (H): ICD-10-CM

## 2019-06-27 DIAGNOSIS — E11.9 NEW ONSET TYPE 2 DIABETES MELLITUS (H): ICD-10-CM

## 2019-06-27 DIAGNOSIS — E11.9 TYPE 2 DIABETES MELLITUS WITHOUT COMPLICATION, WITHOUT LONG-TERM CURRENT USE OF INSULIN (H): Primary | ICD-10-CM

## 2019-06-27 PROCEDURE — G0108 DIAB MANAGE TRN  PER INDIV: HCPCS

## 2019-06-27 RX ORDER — METFORMIN HCL 500 MG
1000 TABLET, EXTENDED RELEASE 24 HR ORAL 2 TIMES DAILY WITH MEALS
Qty: 360 TABLET | Refills: 0 | Status: SHIPPED | OUTPATIENT
Start: 2019-06-27 | End: 2019-10-17

## 2019-06-27 RX ORDER — INSULIN GLARGINE 100 [IU]/ML
INJECTION, SOLUTION SUBCUTANEOUS
Qty: 9 ML | Refills: 0 | Status: SHIPPED | OUTPATIENT
Start: 2019-06-27 | End: 2019-08-26

## 2019-06-27 NOTE — PATIENT INSTRUCTIONS
Use control solution to check your test strips.  Carry sugar with you at all times.  Take 1 tablet of metformin with breakfast and 2 tablets of metformin with supper daily for 1 week, then increase dose up to 2 tablets with breakfast and 2 tablets with supper ongoing.  Call if concerns.  Follow up with Shaina Diaz RD in [place].

## 2019-06-27 NOTE — PROGRESS NOTES
"Diabetes Self-Management Education & Support    Diabetes Education Self Management & Training    SUBJECTIVE/OBJECTIVE:  Accompanied by: Self  Diabetes education in the past 24 mo: Yes  Focus of Visit: Taking Medication, Monitoring  Diabetes type: Type 2  Disease course: Stable  How confident are you filling out medical forms by yourself:: Not Assessed  Transportation concerns: No  Other concerns:: None  Cultural Influences/Ethnic Background:  American      Diabetes Symptoms & Complications  Blurred vision: not discussed today  Fatigue: Yes  Foot ulcerations: No  Polydipsia: No  Polyphagia: No  Weakness: No  Weight loss: No  Slow healing wounds: No  Weight trend: Stable  Autonomic neuropathy: No  CVA: No  Heart disease: No  Nephropathy: No  Peripheral neuropathy: No  Peripheral Vascular Disease: No  Retinopathy: No  Sexual dysfunction: No    Patient Problem List and Family Medical History reviewed for relevant medical history, current medical status, and diabetes risk factors.    Vitals:  Wt 93.6 kg (206 lb 6.4 oz)   BMI 28.95 kg/m    Estimated body mass index is 28.95 kg/m  as calculated from the following:    Height as of 6/13/19: 1.798 m (5' 10.8\").    Weight as of this encounter: 93.6 kg (206 lb 6.4 oz).   Last 3 BP:   BP Readings from Last 3 Encounters:   06/13/19 130/88   03/07/19 128/74   10/05/18 110/78       History   Smoking Status     Never Smoker   Smokeless Tobacco     Never Used       Labs:  Lab Results   Component Value Date    A1C 12.7 03/07/2019     Lab Results   Component Value Date     06/13/2019     Lab Results   Component Value Date     02/28/2019     HDL Cholesterol   Date Value Ref Range Status   02/28/2019 39 (L) >39 mg/dL Final   ]  GFR Estimate   Date Value Ref Range Status   06/13/2019 >90 >60 mL/min/[1.73_m2] Final     Comment:     Non  GFR Calc  Starting 12/18/2018, serum creatinine based estimated GFR (eGFR) will be   calculated using the Chronic Kidney " Disease Epidemiology Collaboration   (CKD-EPI) equation.       GFR Estimate If Black   Date Value Ref Range Status   2019 >90 >60 mL/min/[1.73_m2] Final     Comment:      GFR Calc  Starting 2018, serum creatinine based estimated GFR (eGFR) will be   calculated using the Chronic Kidney Disease Epidemiology Collaboration   (CKD-EPI) equation.       Lab Results   Component Value Date    CR 0.75 2019     No results found for: MICROALBUMIN    Healthy Eating  Healthy Eating Assessed Today: Yes  Cultural/Restorationism diet restrictions?: No  Patient on a regular basis: Eats 3 meals a day  Meal planning: Avoiding sweets, Smaller portions  Meals include: Breakfast, Lunch, Dinner, Snacks  Breakfast:  6-7 am-2 peanut butter sandwiches or meat with gallego sandwich, drinks diet Coke  Lunch: 12- sandwiches 2 or fast food- sandwich with fries or [employer], diet pop  Dinner: 6:30 pm- BBQ pork chop 1,  potatoes, drinks Crystal Light  Snacks: 7 pm- fruit or chips   Beverages: Diet soda, Water, Sports drinks  Has patient met with a dietitian in the past?: No    Being Active  Exercise:: Currently not exercising (Active at work and mows his large yard.)  Barrier to exercise: Time    Monitoring  Monitoring Assessed Today: Yes  Did patient bring glucose meter to appointment? : No  Blood Glucose Meter: One Touch(Verio)  Home Glucose (Sugar) Monitorin-2 times per day  Blood glucose trend: No change  Low Glucose Range (mg/dL): 110-130  High Glucose Range (mg/dL): 130-140(per pt report)    Per patient report his fasting levels are the highest of the day about 130's and after work they are usually lower like about 100-110 mg/dl.   No reports of low blood glucose levels.    Taking Medications  Diabetes Medication(s)     Biguanides       metFORMIN (GLUCOPHAGE-XR) 500 MG 24 hr tablet    Take 2 tablets (1,000 mg) by mouth 2 times daily (with meals)    Insulin       insulin glargine (BASAGLAR KWIKPEN) 100 UNIT/ML pen     Inject 6 Units Subcutaneous At Bedtime Increase dose by 2 units every 3 days until morning fasting blood sugar is 130 or less      Patient currently has increased his Basaglar up to 11 units at HS daily.    Current Treatments: Diet, Oral Agent (monotherapy), Insulin Injections  Dose schedule: pre-breakfast, pre-dinner, at bedtime  Given by: Patient  Diabetes medication side effects?: No  Treatment Compliance: Most of the time    Problem Solving  Problem Solving Assessed Today: Yes  Hypoglycemia Frequency: Never  Patient carries a carbohydrate source: No  Medical alert: No  Severe weather/disaster plan for diabetes management?: No  DKA prevention plan?: No    Reducing Risks  Reducing Risks Assessed Today: No  Has dilated eye exam at least once a year?: Yes  Sees dentist every 6 months?: No  Sees podiatrist (foot doctor)?: No    Healthy Coping  Emotional response to diabetes: Ready to learn  Stage of change: MAINTENANCE (Working to maintain change, with risk of relapse)  Difficulty affording diabetes management supplies?: Yes  Patient Activation Measure Survey Score:  DUSTIN Score (Last Two) 1/13/2016   DUSTIN Raw Score 52   Activation Score 100   DUSTIN Level 4       ASSESSMENT:  Patient is here for follow up after starting insulin to address his elevated blood glucose levels.  He is doing well and reports he is not so thirsty any more and feels well except for some continuing fatigue. He is comfortable with taking the insulin. History of necrotizing pancreatitis.  He continues to be mindful when eating and is hoping to not gain back the weight he lost when his blood glucose level was so high.  Patient forgot his log sheets and meter at home today.  Patient can benefit from advancing his metformin dose and continuing basal insulin.    Goals Addressed as of 6/27/2019 at 11:04 AM                 Today    5/3/19      Healthy Eating (pt-stated)   100%  100%    Added 4/18/19 by Ester Rodriguez RN     My Goal: I will decrease  my intake of regular soda and work toward drinking diet soda or water.      What I need to meet my goal: Motivation to improve my health and diabetes control.    I plan to meet my goal by this date: 2 weeks            Patient's most recent   Lab Results   Component Value Date    A1C 12.7 03/07/2019    is not meeting goal of <7.0    INTERVENTION:   Diabetes knowledge and skills assessment:     Patient is knowledgeable in diabetes management concepts related to: Monitoring and Taking Medication    Patient needs further education on the following diabetes management concepts: Healthy Eating, Monitoring, Taking Medication, Problem Solving, Reducing Risks and Healthy Coping    Based on learning assessment above, most appropriate setting for further diabetes education would be: Individual setting.    Education provided today on:  AADE Self-Care Behaviors:  Healthy Eating: working toward drinking more water and using less beverages sweetened with artificial sweeteners.  Monitoring: individual blood glucose targets and use of glucose control solution  Taking Medication: action of prescribed medication, drawing up, administering and storing injectable diabetes medications and side effects of prescribed medications  Problem Solving: low blood glucose - causes, signs/symptoms, treatment and prevention and carrying a carbohydrate source at all times  Healthy Coping: benefits of making appropriate lifestyle changes    Opportunities for ongoing education and support in diabetes-self management were discussed.    Pt verbalized understanding of concepts discussed and recommendations provided today.       Education Materials Provided:  No new materials given to pt today.  [place] [employer] Public [employer] Driving forms mailed out to patient.    PLAN:  See Patient Instructions for co-developed, patient-stated behavior change goals.  AVS printed and provided to patient today. See Follow-Up section for recommended follow-up.    Patient to  follow up in [place] with Shaina Diaz RD to continue diabetes education for a new diagnosis of Type 2 DM.  Needs information on heart healthy eating, sick days, major complications, reducing risk of complications, health maintenance visits, medication dose adjustment and when to contact provider. Medical ID.  Patient may be interested in less costly glucose meter and test strip options.    Ester Rodriguez RN  BSN CDE    Time Spent: 60 minutes  Encounter Type: Individual    Any diabetes medication dose changes were made via the CDE Protocol and Collaborative Practice Agreement with the patient's referring provider. A copy of this encounter was shared with the provider.

## 2019-08-07 ENCOUNTER — TELEPHONE (OUTPATIENT)
Dept: ENDOCRINOLOGY | Facility: CLINIC | Age: 59
End: 2019-08-07

## 2019-08-07 NOTE — TELEPHONE ENCOUNTER
Attempt to call the patient for previsit call, appointment 8/21/19 at 9 am with Dr. Prasad. No answer, left message to call back.    Rsoa Keys [facility]  Adult Endocrinology  [facility]

## 2019-08-21 ENCOUNTER — OFFICE VISIT (OUTPATIENT)
Dept: ENDOCRINOLOGY | Facility: CLINIC | Age: 59
End: 2019-08-21
Attending: STUDENT IN AN ORGANIZED HEALTH CARE EDUCATION/TRAINING PROGRAM
Payer: COMMERCIAL

## 2019-08-21 VITALS
WEIGHT: 207.6 LBS | BODY MASS INDEX: 29.12 KG/M2 | SYSTOLIC BLOOD PRESSURE: 126 MMHG | HEART RATE: 73 BPM | OXYGEN SATURATION: 97 % | DIASTOLIC BLOOD PRESSURE: 77 MMHG

## 2019-08-21 DIAGNOSIS — E08.9 DIABETES MELLITUS DUE TO UNDERLYING CONDITION WITHOUT COMPLICATION, UNSPECIFIED WHETHER LONG TERM INSULIN USE (H): Primary | ICD-10-CM

## 2019-08-21 LAB
GLUCOSE SERPL-MCNC: 152 MG/DL (ref 70–99)
HBA1C MFR BLD: 5.8 % (ref 0–5.6)

## 2019-08-21 PROCEDURE — 99000 SPECIMEN HANDLING OFFICE-LAB: CPT | Performed by: INTERNAL MEDICINE

## 2019-08-21 PROCEDURE — 36415 COLL VENOUS BLD VENIPUNCTURE: CPT | Performed by: INTERNAL MEDICINE

## 2019-08-21 PROCEDURE — 84681 ASSAY OF C-PEPTIDE: CPT | Performed by: INTERNAL MEDICINE

## 2019-08-21 PROCEDURE — 99204 OFFICE O/P NEW MOD 45 MIN: CPT | Performed by: INTERNAL MEDICINE

## 2019-08-21 PROCEDURE — 86341 ISLET CELL ANTIBODY: CPT | Mod: 90 | Performed by: INTERNAL MEDICINE

## 2019-08-21 PROCEDURE — 83036 HEMOGLOBIN GLYCOSYLATED A1C: CPT | Performed by: INTERNAL MEDICINE

## 2019-08-21 PROCEDURE — 82947 ASSAY GLUCOSE BLOOD QUANT: CPT | Performed by: INTERNAL MEDICINE

## 2019-08-21 NOTE — LETTER
8/21/2019         RE: Zackery Rogers  [address] [facility] [id number]        Dear Colleague,    Thank you for referring your patient, Zackery Rogers, to the [facility]. Please see a copy of my visit note below.      The patient is seen in consultation at the request of Dr. Kalina Poe for evaluation of diabetes.     Zackery Rogers is a 58 year old male with a past medical history significant for diffuse idiopathic skeletal hyperostosis, hypertension, obesity, rosacea, first diagnosed with pancreatitis in 2015.  In May 2017 he had an upper endoscopic ultrasound with pancreatic cyst gastrostomy, followed by ERCP for necrotizing pancreatitis, in May 2017. He did have a CT scan on 03/05/2019 which showed an atrophied pancreatic neck and body with a cystic lesion measuring 1.8 cm at the tail, suspected to represent necrosis, versus focal dilatation of the pancreatic duct.    In March this year, he was formally diagnosed with diabetes.  Hemoglobin A1c was 12.7, on 3/11/2019.  Anti-islet cell antibodies were negative. As per patient, he lost 40 to 45 pounds over a period of 4 months, he developed blurred vision, increased thirst and increased urination.  Initially, he thought his symptoms were due to a change in his antihypertensive medications.  He still thinks his weight is 30 pounds below his baseline weight.  Per outside records, his baseline weight is around 225-230 pounds.  His current weight is 206 pounds.    The patient is not aware of a family history of diabetes.  On review of the outside records, his blood glucose was 109 in 2014 (I am assuming it was a fasting blood sugar since the patient had a lipid panel done at the same time).  He also had higher blood sugar numbers documented over the years (for instance 116, in 2016) although I am not sure whether or not they were fasting numbers or not.    He did meet with the diabetes educator and the dietitian.  At  home, he has been checking his blood sugar fasting, prior to breakfast, and he reports an average blood sugar of 105.  He did not bring his meter at the appointment.  Hemoglobin A1c today was 5.8%.    At the time of diagnosis, he was started on metformin and the dose was gradually titrated up to the current dose of 2 g extended release metformin daily.  In April, Basaglar was added and he continues to take it, at 11 units at 9 PM.    Since being diagnosed with diabetes, he has significantly changed his diet.  He is telling me that he used to almost always have donuts for breakfast and drink a lot of pop.  He completely gave up drinking regular pop.     Diabetes complications:  Retinopathy: Last eye exam - 4/2019.  He is regularly seen for macular degeneration/central serous chorioretinopathy.  Nephropathy: No history of microalbuminuria.  Most recent urine microalbumin negative in March 2019  Neuropathy: no numbness or tingling sensation   LDL cholesterol 127, in 2/19.    Past Medical History   Past Medical History:   Diagnosis Date     DISH (diffuse idiopathic skeletal hyperostosis)      History of acute pancreatitis 2015     Hypertension      Obesity      Pancreatic mass      Rosacea    HTN diagnosed 2013   Clavicle fracture 20 years   Macular degeneration     Past Surgical [place]   Past Surgical History:   Procedure Laterality Date     COLONOSCOPY N/A 10/1/2018    Procedure: COLONOSCOPY;  Colonoscopy;  Surgeon: Rajinder Ridley MD;  Location: [facility]     ENDOSCOPIC RETROGRADE CHOLANGIOPANCREATOGRAM, NECROSECTOMY N/A 6/5/2017    Procedure: ENDOSCOPIC RETROGRADE CHOLANGIOPANCREATOGRAM, NECROSECTOMY;  Endoscopic Retrograde Chlangiopancreatogram, Necrosectomy ;  Surgeon: Guru Diana Navarro MD;  Location: [facility]     ENDOSCOPIC ULTRASOUND UPPER GASTROINTESTINAL TRACT (GI) N/A 5/19/2017    Procedure: ENDOSCOPIC ULTRASOUND, ESOPHAGOSCOPY / UPPER GASTROINTESTINAL TRACT (GI);  Upper Endoscopic  "Ultrasound with with cyst gastrostomy, hot axios stent;  Surgeon: Guru Diana Navarro MD;  Location: [facility] OR     NO HISTORY OF SURGERY         Current Medications     Current Outpatient Medications:      aspirin (ASA) 81 MG tablet, Take 1 tablet (81 mg) by mouth daily, Disp: 100 tablet, Rfl: 3     blood glucose (ONETOUCH VERIO IQ) test strip, Use to test blood sugar 3 times daily or as directed.  Ok to substitute alternative if insurance prefers., Disp: 100 strip, Rfl: prn     blood glucose calibration (ONETOUCH VERIO) solution, Use to calibrate blood glucose monitor as needed as directed., Disp: 1 Bottle, Rfl: 3     blood glucose monitoring (ONE TOUCH DELICA) lancets, Use to test blood sugars 3 times daily or as directed., Disp: 100 each, Rfl: 5     insulin glargine (BASAGLAR KWIKPEN) 100 UNIT/ML pen, Inject 11 Units Subcutaneous At Bedtime Increase dose by 2 units every 3 days until morning fasting blood sugar is 130 or less, Disp: 9 mL, Rfl: 0     insulin pen needle (31G X 6 MM) 31G X 6 MM miscellaneous, Use 1 pen needles daily or as directed., Disp: 100 each, Rfl: 3     lisinopril (PRINIVIL/ZESTRIL) 40 MG tablet, Take 1 tablet (40 mg) by mouth daily, Disp: 90 tablet, Rfl: 0     metFORMIN (GLUCOPHAGE-XR) 500 MG 24 hr tablet, Take 2 tablets (1,000 mg) by mouth 2 times daily (with meals), Disp: 360 tablet, Rfl: 0     minocycline (MINOCIN/DYNACIN) 100 MG capsule, Take 1 capsule (100 mg) by mouth 2 times daily, Disp: 180 capsule, Rfl: 3     simvastatin (ZOCOR) 20 MG tablet, Take 1 tablet (20 mg) by mouth At Bedtime, Disp: 90 tablet, Rfl: 3    Family History  Father  of a CVA - mid 70s. Mother - HTN. F/H of macular degeneration.     Social History   with 2 children. He denies smoking. Heavy drinker in his 20s. Since 30s \"light drinking\". He stopped drinking 6-7 years ago, as his wife is an alcoholic. He denies using illicit drugs. Occupation: .     Review of Systems   Systemic:   "             mild fatigue - which he attributes to his job - working 10 hrs a day   Eye:                       No eye symptoms   Ronald-Laryngeal:      No ronald-laryngeal symptoms, no dysphagia, no voice hoarseness, no cough      Breast:                   No breast symptoms  Cardiovascular:     Occasional palpitations noticed once every couple of weeks, for a few years, heart beats are irregular for a couple of minutes. They tend to occur when he is tired.   Pulmonary:            No pulmonary symptoms, no cough or SOB    Gastrointestinal:    No gastrointestinal symptoms, no diarrhea or constipation   Genitourinary:        No genitourinary symptoms; he rarely uses the restroom at night   Endocrine:             No endocrine symptoms, no heat or cold intolerance   Neurological:         no headaches, intermittent longstanding tremor of his hands - since his 20s, no dizziness     Musculoskeletal:   some joint pain noticed in his hands - for a couple of years   Skin:                      No skin symptoms   Psychological:       No psychological symptoms                Vital Signs     Previous Weights:    Wt Readings from Last 10 Encounters:   06/27/19 93.6 kg (206 lb 6.4 oz)   06/13/19 94.8 kg (209 lb)   05/03/19 93 kg (205 lb)   04/18/19 93.4 kg (205 lb 12.8 oz)   03/07/19 89.9 kg (198 lb 4.8 oz)   10/01/18 102.6 kg (226 lb 3.2 oz)   09/04/18 102.6 kg (226 lb 3.2 oz)   07/13/17 99 kg (218 lb 3.2 oz)   06/05/17 96.1 kg (211 lb 13.8 oz)   05/19/17 101.7 kg (224 lb 3.3 oz)                   /77 (BP Location: Left arm, Patient Position: Sitting, Cuff Size: Adult Regular)   Pulse 73   Wt 94.2 kg (207 lb 9.6 oz)   SpO2 97%   BMI 29.12 kg/m       Physical Exam  General Appearance:  he is well developed, well nourished and in no distress     Eyes:  conjutivae and extra-ocular motions are normal.                                    pupils round and reactive to light, no lid lag, no stare    HEENT:   oropharynx clear and moist,  no JVD, no bruits      no thyromegaly, no palpable nodules   Cardiovascular:  regular rhythm, no murmurs, distal pulse palpable, no edema  Respiratory:       chest clear, no rales, no rhonchi   Gastrointestinal: abdomen soft, non-tender, non-distended, normal bowel sounds,   no organomegaly   Musculoskeletal: normal tone and strength  Psychiatric: affect and judgment normal  Skin: warm, no lesions   Neurological: reflexes normal and symmetric, no resting tremor,   Feet                          sensation intact to monofilament testing      Lab Results  I reviewed prior lab results documented in Epic.  Lab Results   Component Value Date    A1C 12.7 (H) 03/07/2019       Hemoglobin   Date Value Ref Range Status   05/19/2017 12.7 (L) 13.3 - 17.7 g/dL Final     Hematocrit   Date Value Ref Range Status   05/19/2017 39.3 (L) 40.0 - 53.0 % Final     Cholesterol   Date Value Ref Range Status   02/28/2019 185 <200 mg/dL Final     HDL Cholesterol   Date Value Ref Range Status   02/28/2019 39 (L) >39 mg/dL Final     LDL Cholesterol Calculated   Date Value Ref Range Status   02/28/2019 127 (H) <100 mg/dL Final     Comment:     Above desirable:  100-129 mg/dl  Borderline High:  130-159 mg/dL  High:             160-189 mg/dL  Very high:       >189 mg/dl       Triglycerides   Date Value Ref Range Status   02/28/2019 96 <150 mg/dL Final     Albumin Urine mg/L   Date Value Ref Range Status   03/07/2019 6 mg/L Final     TSH   Date Value Ref Range Status   03/07/2019 1.33 0.40 - 4.00 mU/L Final         Last Basic Metabolic Panel:    Sodium   Date Value Ref Range Status   06/13/2019 140 133 - 144 mmol/L Final     Potassium   Date Value Ref Range Status   06/13/2019 4.2 3.4 - 5.3 mmol/L Final     Chloride   Date Value Ref Range Status   06/13/2019 108 94 - 109 mmol/L Final     Calcium   Date Value Ref Range Status   06/13/2019 8.6 8.5 - 10.1 mg/dL Final     Carbon Dioxide   Date Value Ref Range Status   06/13/2019 27 20 - 32 mmol/L Final      Urea Nitrogen   Date Value Ref Range Status   06/13/2019 13 7 - 30 mg/dL Final     Creatinine   Date Value Ref Range Status   06/13/2019 0.75 0.66 - 1.25 mg/dL Final     GFR Estimate   Date Value Ref Range Status   06/13/2019 >90 >60 mL/min/[1.73_m2] Final     Comment:     Non  GFR Calc  Starting 12/18/2018, serum creatinine based estimated GFR (eGFR) will be   calculated using the Chronic Kidney Disease Epidemiology Collaboration   (CKD-EPI) equation.       Glucose   Date Value Ref Range Status   06/13/2019 109 (H) 70 - 99 mg/dL Final       AST   Date Value Ref Range Status   06/13/2019 17 0 - 45 U/L Final     ALT   Date Value Ref Range Status   06/13/2019 30 0 - 70 U/L Final     Albumin   Date Value Ref Range Status   06/13/2019 3.6 3.4 - 5.0 g/dL Final       Assessment     Diabetes, most likely type 2, although secondary diabetes remains in the differential diagnosis, given his history of necrotizing pancreatitis.     The patient was counseled on the natural history of type 2 diabetes, macro and microvascular complications, target A1c below 7%, target fasting blood sugar below 130 and maximum postprandial below 180, the hypoglycemic side effect of insulin, hypoglycemic symptoms, correction of hypoglycemia.  Also discussed about low carbohydrate diet and counseled on main food products which contain carbohydrates (white flour, rice, potatoes).  Of note that the patient is interested in maintaining the weight loss.    Recommendations:  Check LINDA 65 antibodies  Perform stimulated C-peptide test, to evaluate the pancreatic reserve.  Based on the results, if the C-peptide is generous, the plan is to discontinue Basaglar and start patient on either an SGLT2 or GLP-1 agonist.  Advised patient to have this test done 30 minutes after having two chocolate bars.    Orders Placed This Encounter   Procedures     Hemoglobin A1c POCT     Glucose     C-peptide     Glutamic acid decarboxylase antibody                Again, thank you for allowing me to participate in the care of your patient.        Sincerely,        Yessi Prasad MD

## 2019-08-21 NOTE — NURSING NOTE
Zackery Rogers's goals for this visit include:   Chief Complaint   Patient presents with     Consult     Diabetes     He requests these members of his care team be copied on today's visit information: Yes    PCP: Kalina Poe    Referring Provider:  Kalina Poe, DINH CNP  [address] [place] [address]    /77 (BP Location: Left arm, Patient Position: Sitting, Cuff Size: Adult Regular)   Pulse 73   Wt 94.2 kg (207 lb 9.6 oz)   SpO2 97%   BMI 29.12 kg/m      Do you need any medication refills at today's visit? No

## 2019-08-21 NOTE — PROGRESS NOTES
The patient is seen in consultation at the request of Dr. Kalina Poe for evaluation of diabetes.     Zackery Rogers is a 58 year old male with a past medical history significant for diffuse idiopathic skeletal hyperostosis, hypertension, obesity, rosacea, first diagnosed with pancreatitis in 2015.  In May 2017 he had an upper endoscopic ultrasound with pancreatic cyst gastrostomy, followed by ERCP for necrotizing pancreatitis, in May 2017. He did have a CT scan on 03/05/2019 which showed an atrophied pancreatic neck and body with a cystic lesion measuring 1.8 cm at the tail, suspected to represent necrosis, versus focal dilatation of the pancreatic duct.    In March this year, he was formally diagnosed with diabetes.  Hemoglobin A1c was 12.7, on 3/11/2019.  Anti-islet cell antibodies were negative. As per patient, he lost 40 to 45 pounds over a period of 4 months, he developed blurred vision, increased thirst and increased urination.  Initially, he thought his symptoms were due to a change in his antihypertensive medications.  He still thinks his weight is 30 pounds below his baseline weight.  Per outside records, his baseline weight is around 225-230 pounds.  His current weight is 206 pounds.    The patient is not aware of a family history of diabetes.  On review of the outside records, his blood glucose was 109 in 2014 (I am assuming it was a fasting blood sugar since the patient had a lipid panel done at the same time).  He also had higher blood sugar numbers documented over the years (for instance 116, in 2016) although I am not sure whether or not they were fasting numbers or not.    He did meet with the diabetes educator and the dietitian.  At home, he has been checking his blood sugar fasting, prior to breakfast, and he reports an average blood sugar of 105.  He did not bring his meter at the appointment.  Hemoglobin A1c today was 5.8%.    At the time of diagnosis, he was started on  metformin and the dose was gradually titrated up to the current dose of 2 g extended release metformin daily.  In April, Basaglar was added and he continues to take it, at 11 units at 9 PM.    Since being diagnosed with diabetes, he has significantly changed his diet.  He is telling me that he used to almost always have donuts for breakfast and drink a lot of pop.  He completely gave up drinking regular pop.     Diabetes complications:  Retinopathy: Last eye exam - 4/2019.  He is regularly seen for macular degeneration/central serous chorioretinopathy.  Nephropathy: No history of microalbuminuria.  Most recent urine microalbumin negative in March 2019  Neuropathy: no numbness or tingling sensation   LDL cholesterol 127, in 2/19.    Past Medical History   Past Medical History:   Diagnosis Date     DISH (diffuse idiopathic skeletal hyperostosis)      History of acute pancreatitis 2015     Hypertension      Obesity      Pancreatic mass      Rosacea    HTN diagnosed 2013   Clavicle fracture 20 years   Macular degeneration     Past Surgical Hystory   Past Surgical History:   Procedure Laterality Date     COLONOSCOPY N/A 10/1/2018    Procedure: COLONOSCOPY;  Colonoscopy;  Surgeon: Rajinder Ridley MD;  Location: [facility]     ENDOSCOPIC RETROGRADE CHOLANGIOPANCREATOGRAM, NECROSECTOMY N/A 6/5/2017    Procedure: ENDOSCOPIC RETROGRADE CHOLANGIOPANCREATOGRAM, NECROSECTOMY;  Endoscopic Retrograde Chlangiopancreatogram, Necrosectomy ;  Surgeon: Guru Diana Navarro MD;  Location: [facility]     ENDOSCOPIC ULTRASOUND UPPER GASTROINTESTINAL TRACT (GI) N/A 5/19/2017    Procedure: ENDOSCOPIC ULTRASOUND, ESOPHAGOSCOPY / UPPER GASTROINTESTINAL TRACT (GI);  Upper Endoscopic Ultrasound with with cyst gastrostomy, hot axios stent;  Surgeon: Guru Diana Navarro MD;  Location: [facility]     NO HISTORY OF SURGERY         Current Medications     Current Outpatient Medications:      aspirin (ASA) 81 MG tablet, Take  "1 tablet (81 mg) by mouth daily, Disp: 100 tablet, Rfl: 3     blood glucose (ONETOUCH VERIO IQ) test strip, Use to test blood sugar 3 times daily or as directed.  Ok to substitute alternative if insurance prefers., Disp: 100 strip, Rfl: prn     blood glucose calibration (ONETOUCH VERIO) solution, Use to calibrate blood glucose monitor as needed as directed., Disp: 1 Bottle, Rfl: 3     blood glucose monitoring (ONE TOUCH DELICA) lancets, Use to test blood sugars 3 times daily or as directed., Disp: 100 each, Rfl: 5     insulin glargine (BASAGLAR KWIKPEN) 100 UNIT/ML pen, Inject 11 Units Subcutaneous At Bedtime Increase dose by 2 units every 3 days until morning fasting blood sugar is 130 or less, Disp: 9 mL, Rfl: 0     insulin pen needle (31G X 6 MM) 31G X 6 MM miscellaneous, Use 1 pen needles daily or as directed., Disp: 100 each, Rfl: 3     lisinopril (PRINIVIL/ZESTRIL) 40 MG tablet, Take 1 tablet (40 mg) by mouth daily, Disp: 90 tablet, Rfl: 0     metFORMIN (GLUCOPHAGE-XR) 500 MG 24 hr tablet, Take 2 tablets (1,000 mg) by mouth 2 times daily (with meals), Disp: 360 tablet, Rfl: 0     minocycline (MINOCIN/DYNACIN) 100 MG capsule, Take 1 capsule (100 mg) by mouth 2 times daily, Disp: 180 capsule, Rfl: 3     simvastatin (ZOCOR) 20 MG tablet, Take 1 tablet (20 mg) by mouth At Bedtime, Disp: 90 tablet, Rfl: 3    Family History  Father  of a CVA - mid 70s. Mother - HTN. F/H of macular degeneration.     Social History   with 2 children. He denies smoking. Heavy drinker in his 20s. Since 30s \"light drinking\". He stopped drinking 6-7 years ago, as his wife is an alcoholic. He denies using illicit drugs. Occupation: .     Review of Systems   Systemic:               mild fatigue - which he attributes to his job - working 10 hrs a day   Eye:                       No eye symptoms   Ronald-Laryngeal:      No ronald-laryngeal symptoms, no dysphagia, no voice hoarseness, no cough      Breast:                   " No breast symptoms  Cardiovascular:     Occasional palpitations noticed once every couple of weeks, for a few years, heart beats are irregular for a couple of minutes. They tend to occur when he is tired.   Pulmonary:            No pulmonary symptoms, no cough or SOB    Gastrointestinal:    No gastrointestinal symptoms, no diarrhea or constipation   Genitourinary:        No genitourinary symptoms; he rarely uses the restroom at night   Endocrine:             No endocrine symptoms, no heat or cold intolerance   Neurological:         no headaches, intermittent longstanding tremor of his hands - since his 20s, no dizziness     Musculoskeletal:   some joint pain noticed in his hands - for a couple of years   Skin:                      No skin symptoms   Psychological:       No psychological symptoms                Vital Signs     Previous Weights:    Wt Readings from Last 10 Encounters:   06/27/19 93.6 kg (206 lb 6.4 oz)   06/13/19 94.8 kg (209 lb)   05/03/19 93 kg (205 lb)   04/18/19 93.4 kg (205 lb 12.8 oz)   03/07/19 89.9 kg (198 lb 4.8 oz)   10/01/18 102.6 kg (226 lb 3.2 oz)   09/04/18 102.6 kg (226 lb 3.2 oz)   07/13/17 99 kg (218 lb 3.2 oz)   06/05/17 96.1 kg (211 lb 13.8 oz)   05/19/17 101.7 kg (224 lb 3.3 oz)                   /77 (BP Location: Left arm, Patient Position: Sitting, Cuff Size: Adult Regular)   Pulse 73   Wt 94.2 kg (207 lb 9.6 oz)   SpO2 97%   BMI 29.12 kg/m      Physical Exam  General Appearance:  he is well developed, well nourished and in no distress     Eyes:  conjutivae and extra-ocular motions are normal.                                    pupils round and reactive to light, no lid lag, no stare    HEENT:   oropharynx clear and moist, no JVD, no bruits      no thyromegaly, no palpable nodules   Cardiovascular:  regular rhythm, no murmurs, distal pulse palpable, no edema  Respiratory:       chest clear, no rales, no rhonchi   Gastrointestinal: abdomen soft, non-tender,  non-distended, normal bowel sounds,   no organomegaly   Musculoskeletal: normal tone and strength  Psychiatric: affect and judgment normal  Skin: warm, no lesions   Neurological: reflexes normal and symmetric, no resting tremor,   Feet                          sensation intact to monofilament testing      Lab Results  I reviewed prior lab results documented in Epic.  Lab Results   Component Value Date    A1C 12.7 (H) 03/07/2019       Hemoglobin   Date Value Ref Range Status   05/19/2017 12.7 (L) 13.3 - 17.7 g/dL Final     Hematocrit   Date Value Ref Range Status   05/19/2017 39.3 (L) 40.0 - 53.0 % Final     Cholesterol   Date Value Ref Range Status   02/28/2019 185 <200 mg/dL Final     HDL Cholesterol   Date Value Ref Range Status   02/28/2019 39 (L) >39 mg/dL Final     LDL Cholesterol Calculated   Date Value Ref Range Status   02/28/2019 127 (H) <100 mg/dL Final     Comment:     Above desirable:  100-129 mg/dl  Borderline High:  130-159 mg/dL  High:             160-189 mg/dL  Very high:       >189 mg/dl       Triglycerides   Date Value Ref Range Status   02/28/2019 96 <150 mg/dL Final     Albumin Urine mg/L   Date Value Ref Range Status   03/07/2019 6 mg/L Final     TSH   Date Value Ref Range Status   03/07/2019 1.33 0.40 - 4.00 mU/L Final         Last Basic Metabolic Panel:    Sodium   Date Value Ref Range Status   06/13/2019 140 133 - 144 mmol/L Final     Potassium   Date Value Ref Range Status   06/13/2019 4.2 3.4 - 5.3 mmol/L Final     Chloride   Date Value Ref Range Status   06/13/2019 108 94 - 109 mmol/L Final     Calcium   Date Value Ref Range Status   06/13/2019 8.6 8.5 - 10.1 mg/dL Final     Carbon Dioxide   Date Value Ref Range Status   06/13/2019 27 20 - 32 mmol/L Final     Urea Nitrogen   Date Value Ref Range Status   06/13/2019 13 7 - 30 mg/dL Final     Creatinine   Date Value Ref Range Status   06/13/2019 0.75 0.66 - 1.25 mg/dL Final     GFR Estimate   Date Value Ref Range Status   06/13/2019 >90 >60  mL/min/[1.73_m2] Final     Comment:     Non  GFR Calc  Starting 12/18/2018, serum creatinine based estimated GFR (eGFR) will be   calculated using the Chronic Kidney Disease Epidemiology Collaboration   (CKD-EPI) equation.       Glucose   Date Value Ref Range Status   06/13/2019 109 (H) 70 - 99 mg/dL Final       AST   Date Value Ref Range Status   06/13/2019 17 0 - 45 U/L Final     ALT   Date Value Ref Range Status   06/13/2019 30 0 - 70 U/L Final     Albumin   Date Value Ref Range Status   06/13/2019 3.6 3.4 - 5.0 g/dL Final       Assessment     Diabetes, most likely type 2, although secondary diabetes remains in the differential diagnosis, given his history of necrotizing pancreatitis.     The patient was counseled on the natural history of type 2 diabetes, macro and microvascular complications, target A1c below 7%, target fasting blood sugar below 130 and maximum postprandial below 180, the hypoglycemic side effect of insulin, hypoglycemic symptoms, correction of hypoglycemia.  Also discussed about low carbohydrate diet and counseled on main food products which contain carbohydrates (white flour, rice, potatoes).  Of note that the patient is interested in maintaining the weight loss.    Recommendations:  Check LINDA 65 antibodies  Perform stimulated C-peptide test, to evaluate the pancreatic reserve.  Based on the results, if the C-peptide is generous, the plan is to discontinue Basaglar and start patient on either an SGLT2 or GLP-1 agonist.  Advised patient to have this test done 30 minutes after having two chocolate bars.    Orders Placed This Encounter   Procedures     Hemoglobin A1c POCT     Glucose     C-peptide     Glutamic acid decarboxylase antibody

## 2019-08-22 LAB — C PEPTIDE SERPL-MCNC: 3.5 NG/ML (ref 0.9–6.9)

## 2019-08-23 DIAGNOSIS — E08.9 DIABETES MELLITUS DUE TO UNDERLYING CONDITION WITHOUT COMPLICATION, UNSPECIFIED WHETHER LONG TERM INSULIN USE (H): ICD-10-CM

## 2019-08-25 LAB — GAD65 AB SER IA-ACNC: <5 IU/ML (ref 0–5)

## 2019-10-03 ENCOUNTER — HEALTH MAINTENANCE LETTER (OUTPATIENT)
Age: 59
End: 2019-10-03

## 2019-10-17 DIAGNOSIS — E11.9 NEW ONSET TYPE 2 DIABETES MELLITUS (H): ICD-10-CM

## 2019-10-18 NOTE — TELEPHONE ENCOUNTER
Metformin  Routing refill request to provider for review/approval because:  Patient needs to be seen because:  Overdue for DM OV with PCP    Next 5 appointments (look out 90 days)    Dec 03, 2019  9:45 AM CST  Return Visit with Yessi Prasad MD, [facility] ENDO NURSE  [facility] [address] [place] [id number]  [phone]        Kennedi Tavarez RN, BSN

## 2019-10-21 RX ORDER — METFORMIN HCL 500 MG
1000 TABLET, EXTENDED RELEASE 24 HR ORAL 2 TIMES DAILY WITH MEALS
Qty: 270 TABLET | Refills: 0 | Status: SHIPPED | OUTPATIENT
Start: 2019-10-21 | End: 2019-12-30

## 2019-11-08 ENCOUNTER — TELEPHONE (OUTPATIENT)
Dept: FAMILY MEDICINE | Facility: OTHER | Age: 59
End: 2019-11-08

## 2019-11-08 DIAGNOSIS — E11.65 UNCONTROLLED TYPE 2 DIABETES MELLITUS WITH HYPERGLYCEMIA (H): ICD-10-CM

## 2019-11-08 RX ORDER — INSULIN GLARGINE 100 [IU]/ML
INJECTION, SOLUTION SUBCUTANEOUS
Qty: 9 ML | Refills: 3 | Status: SHIPPED | OUTPATIENT
Start: 2019-11-08 | End: 2020-07-31

## 2019-11-08 NOTE — TELEPHONE ENCOUNTER
Please Advise:    Looks like he is being followed by Endo.     Insulin glargine was D/C'd by Dr. Yessi Prasad 8/26/19.      Yessenia Cervantes RN BSN

## 2019-11-08 NOTE — TELEPHONE ENCOUNTER
PT is calling because when there was a request for insulin he was denied the refill stating that he doesn't need to take anymore. Pt is currently out of insulin. So he is a little confused because he needs to be on it. He would like to have it sent over so he can run there after work today. Please advise.

## 2019-11-08 NOTE — TELEPHONE ENCOUNTER
I am going to forward this to Dr. Prasad for review. Please call patient to notify of this.  PITER MendezC

## 2019-11-11 DIAGNOSIS — I10 UNCONTROLLED HYPERTENSION: ICD-10-CM

## 2019-11-12 RX ORDER — LISINOPRIL 40 MG/1
40 TABLET ORAL DAILY
Qty: 90 TABLET | Refills: 0 | Status: SHIPPED | OUTPATIENT
Start: 2019-11-12 | End: 2020-04-09

## 2019-11-12 NOTE — TELEPHONE ENCOUNTER
Lisinopril  Routing refill request to provider for review/approval because:  A break in medication    Next 5 appointments (look out 90 days)    Dec 03, 2019  9:45 AM CST  Return Visit with Yessi Prasad MD, [facility] ENDO NURSE  [facility] [id number] [place] [id number]  [phone]        Kennedi Tavarez RN, BSN

## 2019-12-03 ENCOUNTER — OFFICE VISIT (OUTPATIENT)
Dept: ENDOCRINOLOGY | Facility: CLINIC | Age: 59
End: 2019-12-03
Payer: COMMERCIAL

## 2019-12-03 VITALS
TEMPERATURE: 97.9 F | HEART RATE: 74 BPM | WEIGHT: 211.8 LBS | BODY MASS INDEX: 29.71 KG/M2 | OXYGEN SATURATION: 95 % | RESPIRATION RATE: 20 BRPM | SYSTOLIC BLOOD PRESSURE: 134 MMHG | DIASTOLIC BLOOD PRESSURE: 85 MMHG

## 2019-12-03 DIAGNOSIS — E78.00 HYPERCHOLESTEROLEMIA: ICD-10-CM

## 2019-12-03 DIAGNOSIS — E11.9 TYPE 2 DIABETES, HBA1C GOAL < 7% (H): Primary | ICD-10-CM

## 2019-12-03 LAB — HBA1C MFR BLD: 5.9 % (ref 0–5.6)

## 2019-12-03 PROCEDURE — 99214 OFFICE O/P EST MOD 30 MIN: CPT | Performed by: INTERNAL MEDICINE

## 2019-12-03 PROCEDURE — 83036 HEMOGLOBIN GLYCOSYLATED A1C: CPT | Performed by: INTERNAL MEDICINE

## 2019-12-03 PROCEDURE — 36415 COLL VENOUS BLD VENIPUNCTURE: CPT | Performed by: INTERNAL MEDICINE

## 2019-12-03 ASSESSMENT — PAIN SCALES - GENERAL: PAINLEVEL: NO PAIN (0)

## 2019-12-03 NOTE — LETTER
12/3/2019         RE: Zackery Rogers  [address] [facility] [id number]        Dear Colleague,    Thank you for referring your patient, Zackery Rogers, to the [facility]. Please see a copy of my visit note below.      The patient is seen in f/up for evaluation of diabetes. He established care in our clinic on 8/21/19.     Zackery Rogers is a 59 year old male with no family history of diabetes and with a past medical history significant for diffuse idiopathic skeletal hyperostosis, hypertension, obesity, rosacea, pancreatitis.  He was first diagnosed with pancreatitis in 2015.  In May 2017, he had an upper endoscopic ultrasound with pancreatic cyst gastrostomy, followed by ERCP for necrotizing pancreatitis. In March 2019, he was formally diagnosed with diabetes.  Hemoglobin A1c was 12.7%, on 3/11/2019.  He did have a CT scan on 03/05/2019 which showed an atrophied pancreatic neck and body with a cystic lesion measuring 1.8 cm at the tail, suspected to represent necrosis, versus focal dilatation of the pancreatic duct.    At the time of diagnosis, he lost 40 to 45 pounds over a period of 4 months, he developed blurred vision, increased thirst and increased urination.  Initially, he thought his symptoms were due to a change in his antihypertensive medications.  He still thinks his weight is ~25 lbs pounds below his baseline weight.  Since being diagnosed with diabetes, he stopped drinking pop and has been paying attention to the overall carbohydrate intake.  Per outside records, his baseline weight is around 225-230 pounds.     In March 2019, he was started on metformin and the dose was gradually titrated up to the current dose of 2 g extended release metformin daily.  In April, Basaglar was added, at 11 units at 9 PM.     Labwork done on 8/21/19 revealed a C peptide of 3.5 for a glucose level of 152. LINDA 65 antibodies and anti-islet cells antibodies were negative. Subsequently, the  plan was to discontinue Basaglar and start Jardiance, 10 mg daily, at the end of August 2019.  The patient is telling me that the copayment for Jardiance was very high and he had to continue insulin.    HbA1c today was 5.9%, unchanged since his first visit here, weight was 5.8%.  He reports checking his blood sugar fasting, every other day.  The numbers are in the low 100s, always below 120.  He denies hypoglycemic episodes.    Diabetes complications:  Retinopathy: Last eye exam - 4/2019.  He is regularly seen for macular degeneration/central serous chorioretinopathy.  Nephropathy: No history of microalbuminuria.  Most recent urine microalbumin negative in March 2019  Neuropathy: no numbness or tingling sensation   LDL cholesterol 127, in 2/19, started on simvastatin in March 2019.     Past Medical History   Past Medical History:   Diagnosis Date     DISH (diffuse idiopathic skeletal hyperostosis)      History of acute pancreatitis 2015     Hypertension      Obesity      Pancreatic mass      Rosacea    HTN diagnosed 2013   Clavicle fracture 20 years   Macular degeneration     Past Surgical Hystory   Past Surgical History:   Procedure Laterality Date     COLONOSCOPY N/A 10/1/2018    Procedure: COLONOSCOPY;  Colonoscopy;  Surgeon: Rajinder Ridley MD;  Location: [facility]     ENDOSCOPIC RETROGRADE CHOLANGIOPANCREATOGRAM, NECROSECTOMY N/A 6/5/2017    Procedure: ENDOSCOPIC RETROGRADE CHOLANGIOPANCREATOGRAM, NECROSECTOMY;  Endoscopic Retrograde Chlangiopancreatogram, Necrosectomy ;  Surgeon: Guru Diana Navarro MD;  Location: [facility]     ENDOSCOPIC ULTRASOUND UPPER GASTROINTESTINAL TRACT (GI) N/A 5/19/2017    Procedure: ENDOSCOPIC ULTRASOUND, ESOPHAGOSCOPY / UPPER GASTROINTESTINAL TRACT (GI);  Upper Endoscopic Ultrasound with with cyst gastrostomy, hot axios stent;  Surgeon: Guru Diana Navarro MD;  Location: [facility]     NO HISTORY OF SURGERY         Current Medications     Current  "Outpatient Medications:      aspirin (ASA) 81 MG tablet, Take 1 tablet (81 mg) by mouth daily, Disp: 100 tablet, Rfl: 3     blood glucose (ONETOUCH VERIO IQ) test strip, Use to test blood sugar 3 times daily or as directed.  Ok to substitute alternative if insurance prefers., Disp: 100 strip, Rfl: prn     blood glucose calibration (ONETOUCH VERIO) solution, Use to calibrate blood glucose monitor as needed as directed., Disp: 1 Bottle, Rfl: 3     blood glucose monitoring (ONE TOUCH DELICA) lancets, Use to test blood sugars 3 times daily or as directed., Disp: 100 each, Rfl: 5     empagliflozin (JARDIANCE) 10 MG TABS tablet, Take 1 tablet (10 mg) by mouth daily, Disp: 90 tablet, Rfl: 3     insulin glargine (BASAGLAR KWIKPEN) 100 UNIT/ML pen, Inject 11 Units Subcutaneous At Bedtime, Disp: 9 mL, Rfl: 3     insulin pen needle (31G X 6 MM) 31G X 6 MM miscellaneous, Use 1 pen needles daily or as directed., Disp: 100 each, Rfl: 3     lisinopril (PRINIVIL/ZESTRIL) 40 MG tablet, Take 1 tablet (40 mg) by mouth daily, Disp: 90 tablet, Rfl: 0     metFORMIN (GLUCOPHAGE-XR) 500 MG 24 hr tablet, Take 2 tablets (1,000 mg) by mouth 2 times daily (with meals), Disp: 270 tablet, Rfl: 0     minocycline (MINOCIN/DYNACIN) 100 MG capsule, Take 1 capsule (100 mg) by mouth 2 times daily, Disp: 180 capsule, Rfl: 3     simvastatin (ZOCOR) 20 MG tablet, Take 1 tablet (20 mg) by mouth At Bedtime, Disp: 90 tablet, Rfl: 3    Family History  Father  of a CVA - mid 70s. Mother - HTN. F/H of macular degeneration.     Social History   with 2 children. He denies smoking. Heavy drinker in his 20s. Since 30s \"light drinking\". He stopped drinking 6-7 years ago, as his wife is an alcoholic. He denies using illicit drugs. Occupation: .     Review of Systems   Systemic:               No significant fatigue  Eye:                       No eye symptoms   Ronald-Laryngeal:      No ronald-laryngeal symptoms, no dysphagia, no voice hoarseness, no " cough      Breast:                   No breast symptoms  Cardiovascular:     Occasional palpitations noticed once every couple of weeks, for a few years, heart beats are irregular for a couple of minutes. They tend to occur when he is tired.   Pulmonary:            No pulmonary symptoms, no cough or SOB    Gastrointestinal:    No gastrointestinal symptoms, no diarrhea or constipation   Genitourinary:        No genitourinary symptoms; he rarely uses the restroom at night   Endocrine:             No endocrine symptoms, no heat or cold intolerance   Neurological:         no headaches, intermittent longstanding tremor of his hands - since his 20s, no dizziness     Musculoskeletal:   some joint pain noticed in his hands - for a couple of years   Skin:                      No skin symptoms   Psychological:       No psychological symptoms                Vital Signs     Previous Weights:    Wt Readings from Last 10 Encounters:   12/03/19 96.1 kg (211 lb 12.8 oz)   08/21/19 94.2 kg (207 lb 9.6 oz)   06/27/19 93.6 kg (206 lb 6.4 oz)   06/13/19 94.8 kg (209 lb)   05/03/19 93 kg (205 lb)   04/18/19 93.4 kg (205 lb 12.8 oz)   03/07/19 89.9 kg (198 lb 4.8 oz)   10/01/18 102.6 kg (226 lb 3.2 oz)   09/04/18 102.6 kg (226 lb 3.2 oz)   07/13/17 99 kg (218 lb 3.2 oz)                   /85 (BP Location: Right arm, Patient Position: Sitting, Cuff Size: Adult Large)   Pulse 74   Temp 97.9  F (36.6  C) (Oral)   Resp 20   Wt 96.1 kg (211 lb 12.8 oz)   SpO2 95%   BMI 29.71 kg/m       Physical Exam  General Appearance:  he is well developed, well nourished and in no distress     Eyes:  conjutivae and extra-ocular motions are normal.                                    pupils round and reactive to light, no lid lag, no stare    HEENT:   oropharynx clear and moist, no JVD, no bruits      no thyromegaly, palpable 2 cm subcutaneous mobile nodule in the upper submandibular area, stable for years as per patient  Cardiovascular:  regular  rhythm, no murmurs, distal pulse palpable, no edema  Respiratory:       chest clear, no rales, no rhonchi   Musculoskeletal: normal tone and strength  Psychiatric: affect and judgment normal  Skin: warm, no lesions   Neurological: reflexes normal and symmetric, no resting tremor     Lab Results  I reviewed prior lab results documented in Epic.  Lab Results   Component Value Date    A1C 5.9 (A) 12/03/2019    A1C 5.8 (A) 08/21/2019    A1C 12.7 (H) 03/07/2019       Hemoglobin   Date Value Ref Range Status   05/19/2017 12.7 (L) 13.3 - 17.7 g/dL Final     Hematocrit   Date Value Ref Range Status   05/19/2017 39.3 (L) 40.0 - 53.0 % Final     Cholesterol   Date Value Ref Range Status   02/28/2019 185 <200 mg/dL Final     HDL Cholesterol   Date Value Ref Range Status   02/28/2019 39 (L) >39 mg/dL Final     LDL Cholesterol Calculated   Date Value Ref Range Status   02/28/2019 127 (H) <100 mg/dL Final     Comment:     Above desirable:  100-129 mg/dl  Borderline High:  130-159 mg/dL  High:             160-189 mg/dL  Very high:       >189 mg/dl       Triglycerides   Date Value Ref Range Status   02/28/2019 96 <150 mg/dL Final     Albumin Urine mg/L   Date Value Ref Range Status   03/07/2019 6 mg/L Final     TSH   Date Value Ref Range Status   03/07/2019 1.33 0.40 - 4.00 mU/L Final         Last Basic Metabolic Panel:    Sodium   Date Value Ref Range Status   06/13/2019 140 133 - 144 mmol/L Final     Potassium   Date Value Ref Range Status   06/13/2019 4.2 3.4 - 5.3 mmol/L Final     Chloride   Date Value Ref Range Status   06/13/2019 108 94 - 109 mmol/L Final     Calcium   Date Value Ref Range Status   06/13/2019 8.6 8.5 - 10.1 mg/dL Final     Carbon Dioxide   Date Value Ref Range Status   06/13/2019 27 20 - 32 mmol/L Final     Urea Nitrogen   Date Value Ref Range Status   06/13/2019 13 7 - 30 mg/dL Final     Creatinine   Date Value Ref Range Status   06/13/2019 0.75 0.66 - 1.25 mg/dL Final     GFR Estimate   Date Value Ref Range  Status   06/13/2019 >90 >60 mL/min/[1.73_m2] Final     Comment:     Non  GFR Calc  Starting 12/18/2018, serum creatinine based estimated GFR (eGFR) will be   calculated using the Chronic Kidney Disease Epidemiology Collaboration   (CKD-EPI) equation.       Glucose   Date Value Ref Range Status   08/21/2019 152 (H) 70 - 99 mg/dL Final     Comment:     Non Fasting       AST   Date Value Ref Range Status   06/13/2019 17 0 - 45 U/L Final     ALT   Date Value Ref Range Status   06/13/2019 30 0 - 70 U/L Final     Albumin   Date Value Ref Range Status   06/13/2019 3.6 3.4 - 5.0 g/dL Final       Assessment     Gustavo Rogers is a 59 year old male with a h/o 2 episodes of pancreatitis (in 2015 and 2017), diagnosed with diabetes in March 2019. The abdominal CT, from March 2019, showed an atrophied pancreatic neck and body.  C-peptide was quite generous at 3.5, for a blood sugar of 152, in August 2019.  LINDA 65 and anti-islet cell antibodies were negative.  His diabetes has been very well controlled with a maximum dose of metformin and 11 units of Basaglar, daily.    1. Type 2 diabetes  Recommendations:  Check blood sugar fasting and at bedtime.  If the bedtime blood sugar is well controlled, we are going to consider discontinuing insulin.  The patient is going to have the meter records downloaded for my review.  Discussed about the option of adding other hypertensive medications, especially if the A1c is above 6.5%.  The patient is going to change his insurance in January this year.  My first preference would be an SGLT2 inhibitor or a GLP-1 agonist.    2.  Hypercholesterolemia.  Treatment with statin was started in March 2019, 20 mg daily.  Recommended to have a fasting lipid panel done at his convenience.  Counseled on the importance of establishing a regular exercise schedule.    Orders Placed This Encounter   Procedures     Lipid Profile               Again, thank you for allowing me to participate in the care  of your patient.        Sincerely,        Yessi Prasad MD

## 2019-12-03 NOTE — PATIENT INSTRUCTIONS
Michell Byrd   [place]   [employer]   [facility]-Department of Endocrinology  Alia Delcid RN, Diabetes Educator: [phone]  Clinic Nurses ASAD Aparicio; CMA's: Rosa [phone]  APPLE RogerN : [phone]  Clinic Fax: [phone]  On-Call Endocrine at the [place] (after hours/weekends): [phone] option 4  Scheduling Line: [phone]    Appointment Reminders:  * Please bring meter with for staff to download  * If you are due ONLY for an A1C, it is scheduled with the nurse and will be done in clinic. You do not need to schedule a lab appointment. Fasting is not required for an A1C.  * Refill request should be submitted to your pharmacy. They will contact clinic for approval.

## 2019-12-03 NOTE — PROGRESS NOTES
The patient is seen in f/up for evaluation of diabetes. He established care in our clinic on 8/21/19.     Zackery Rogers is a 59 year old male with no family history of diabetes and with a past medical history significant for diffuse idiopathic skeletal hyperostosis, hypertension, obesity, rosacea, pancreatitis.  He was first diagnosed with pancreatitis in 2015.  In May 2017, he had an upper endoscopic ultrasound with pancreatic cyst gastrostomy, followed by ERCP for necrotizing pancreatitis. In March 2019, he was formally diagnosed with diabetes.  Hemoglobin A1c was 12.7%, on 3/11/2019.  He did have a CT scan on 03/05/2019 which showed an atrophied pancreatic neck and body with a cystic lesion measuring 1.8 cm at the tail, suspected to represent necrosis, versus focal dilatation of the pancreatic duct.    At the time of diagnosis, he lost 40 to 45 pounds over a period of 4 months, he developed blurred vision, increased thirst and increased urination.  Initially, he thought his symptoms were due to a change in his antihypertensive medications.  He still thinks his weight is ~25 lbs pounds below his baseline weight.  Since being diagnosed with diabetes, he stopped drinking pop and has been paying attention to the overall carbohydrate intake.  Per outside records, his baseline weight is around 225-230 pounds.     In March 2019, he was started on metformin and the dose was gradually titrated up to the current dose of 2 g extended release metformin daily.  In April, Basaglar was added, at 11 units at 9 PM.     Labwork done on 8/21/19 revealed a C peptide of 3.5 for a glucose level of 152. LINDA 65 antibodies and anti-islet cells antibodies were negative. Subsequently, the plan was to discontinue Basaglar and start Jardiance, 10 mg daily, at the end of August 2019.  The patient is telling me that the copayment for Jardiance was very high and he had to continue insulin.    HbA1c today was 5.9%, unchanged since his first  visit here, weight was 5.8%.  He reports checking his blood sugar fasting, every other day.  The numbers are in the low 100s, always below 120.  He denies hypoglycemic episodes.    Diabetes complications:  Retinopathy: Last eye exam - 4/2019.  He is regularly seen for macular degeneration/central serous chorioretinopathy.  Nephropathy: No history of microalbuminuria.  Most recent urine microalbumin negative in March 2019  Neuropathy: no numbness or tingling sensation   LDL cholesterol 127, in 2/19, started on simvastatin in March 2019.     Past Medical History   Past Medical History:   Diagnosis Date     DISH (diffuse idiopathic skeletal hyperostosis)      History of acute pancreatitis 2015     Hypertension      Obesity      Pancreatic mass      Rosacea    HTN diagnosed 2013   Clavicle fracture 20 years   Macular degeneration     Past Surgical Hystory   Past Surgical History:   Procedure Laterality Date     COLONOSCOPY N/A 10/1/2018    Procedure: COLONOSCOPY;  Colonoscopy;  Surgeon: Rajinder Ridley MD;  Location: [facility]     ENDOSCOPIC RETROGRADE CHOLANGIOPANCREATOGRAM, NECROSECTOMY N/A 6/5/2017    Procedure: ENDOSCOPIC RETROGRADE CHOLANGIOPANCREATOGRAM, NECROSECTOMY;  Endoscopic Retrograde Chlangiopancreatogram, Necrosectomy ;  Surgeon: Guru Diana Navarro MD;  Location: [facility]     ENDOSCOPIC ULTRASOUND UPPER GASTROINTESTINAL TRACT (GI) N/A 5/19/2017    Procedure: ENDOSCOPIC ULTRASOUND, ESOPHAGOSCOPY / UPPER GASTROINTESTINAL TRACT (GI);  Upper Endoscopic Ultrasound with with cyst gastrostomy, hot axios stent;  Surgeon: Guru Diana Navarro MD;  Location: [facility]     NO HISTORY OF SURGERY         Current Medications     Current Outpatient Medications:      aspirin (ASA) 81 MG tablet, Take 1 tablet (81 mg) by mouth daily, Disp: 100 tablet, Rfl: 3     blood glucose (ONETOUCH VERIO IQ) test strip, Use to test blood sugar 3 times daily or as directed.  Ok to substitute alternative if  "insurance prefers., Disp: 100 strip, Rfl: prn     blood glucose calibration (ONETOUCH VERIO) solution, Use to calibrate blood glucose monitor as needed as directed., Disp: 1 Bottle, Rfl: 3     blood glucose monitoring (ONE TOUCH DELICA) lancets, Use to test blood sugars 3 times daily or as directed., Disp: 100 each, Rfl: 5     empagliflozin (JARDIANCE) 10 MG TABS tablet, Take 1 tablet (10 mg) by mouth daily, Disp: 90 tablet, Rfl: 3     insulin glargine (BASAGLAR KWIKPEN) 100 UNIT/ML pen, Inject 11 Units Subcutaneous At Bedtime, Disp: 9 mL, Rfl: 3     insulin pen needle (31G X 6 MM) 31G X 6 MM miscellaneous, Use 1 pen needles daily or as directed., Disp: 100 each, Rfl: 3     lisinopril (PRINIVIL/ZESTRIL) 40 MG tablet, Take 1 tablet (40 mg) by mouth daily, Disp: 90 tablet, Rfl: 0     metFORMIN (GLUCOPHAGE-XR) 500 MG 24 hr tablet, Take 2 tablets (1,000 mg) by mouth 2 times daily (with meals), Disp: 270 tablet, Rfl: 0     minocycline (MINOCIN/DYNACIN) 100 MG capsule, Take 1 capsule (100 mg) by mouth 2 times daily, Disp: 180 capsule, Rfl: 3     simvastatin (ZOCOR) 20 MG tablet, Take 1 tablet (20 mg) by mouth At Bedtime, Disp: 90 tablet, Rfl: 3    Family History  Father  of a CVA - mid 70s. Mother - HTN. F/H of macular degeneration.     Social History   with 2 children. He denies smoking. Heavy drinker in his 20s. Since 30s \"light drinking\". He stopped drinking 6-7 years ago, as his wife is an alcoholic. He denies using illicit drugs. Occupation: .     Review of Systems   Systemic:               No significant fatigue  Eye:                       No eye symptoms   Ronald-Laryngeal:      No ronald-laryngeal symptoms, no dysphagia, no voice hoarseness, no cough      Breast:                   No breast symptoms  Cardiovascular:     Occasional palpitations noticed once every couple of weeks, for a few years, heart beats are irregular for a couple of minutes. They tend to occur when he is tired.   Pulmonary:   "          No pulmonary symptoms, no cough or SOB    Gastrointestinal:    No gastrointestinal symptoms, no diarrhea or constipation   Genitourinary:        No genitourinary symptoms; he rarely uses the restroom at night   Endocrine:             No endocrine symptoms, no heat or cold intolerance   Neurological:         no headaches, intermittent longstanding tremor of his hands - since his 20s, no dizziness     Musculoskeletal:   some joint pain noticed in his hands - for a couple of years   Skin:                      No skin symptoms   Psychological:       No psychological symptoms                Vital Signs     Previous Weights:    Wt Readings from Last 10 Encounters:   12/03/19 96.1 kg (211 lb 12.8 oz)   08/21/19 94.2 kg (207 lb 9.6 oz)   06/27/19 93.6 kg (206 lb 6.4 oz)   06/13/19 94.8 kg (209 lb)   05/03/19 93 kg (205 lb)   04/18/19 93.4 kg (205 lb 12.8 oz)   03/07/19 89.9 kg (198 lb 4.8 oz)   10/01/18 102.6 kg (226 lb 3.2 oz)   09/04/18 102.6 kg (226 lb 3.2 oz)   07/13/17 99 kg (218 lb 3.2 oz)                   /85 (BP Location: Right arm, Patient Position: Sitting, Cuff Size: Adult Large)   Pulse 74   Temp 97.9  F (36.6  C) (Oral)   Resp 20   Wt 96.1 kg (211 lb 12.8 oz)   SpO2 95%   BMI 29.71 kg/m      Physical Exam  General Appearance:  he is well developed, well nourished and in no distress     Eyes:  conjutivae and extra-ocular motions are normal.                                    pupils round and reactive to light, no lid lag, no stare    HEENT:   oropharynx clear and moist, no JVD, no bruits      no thyromegaly, palpable 2 cm subcutaneous mobile nodule in the upper submandibular area, stable for years as per patient  Cardiovascular:  regular rhythm, no murmurs, distal pulse palpable, no edema  Respiratory:       chest clear, no rales, no rhonchi   Musculoskeletal: normal tone and strength  Psychiatric: affect and judgment normal  Skin: warm, no lesions   Neurological: reflexes normal and  symmetric, no resting tremor     Lab Results  I reviewed prior lab results documented in Epic.  Lab Results   Component Value Date    A1C 5.9 (A) 12/03/2019    A1C 5.8 (A) 08/21/2019    A1C 12.7 (H) 03/07/2019       Hemoglobin   Date Value Ref Range Status   05/19/2017 12.7 (L) 13.3 - 17.7 g/dL Final     Hematocrit   Date Value Ref Range Status   05/19/2017 39.3 (L) 40.0 - 53.0 % Final     Cholesterol   Date Value Ref Range Status   02/28/2019 185 <200 mg/dL Final     HDL Cholesterol   Date Value Ref Range Status   02/28/2019 39 (L) >39 mg/dL Final     LDL Cholesterol Calculated   Date Value Ref Range Status   02/28/2019 127 (H) <100 mg/dL Final     Comment:     Above desirable:  100-129 mg/dl  Borderline High:  130-159 mg/dL  High:             160-189 mg/dL  Very high:       >189 mg/dl       Triglycerides   Date Value Ref Range Status   02/28/2019 96 <150 mg/dL Final     Albumin Urine mg/L   Date Value Ref Range Status   03/07/2019 6 mg/L Final     TSH   Date Value Ref Range Status   03/07/2019 1.33 0.40 - 4.00 mU/L Final         Last Basic Metabolic Panel:    Sodium   Date Value Ref Range Status   06/13/2019 140 133 - 144 mmol/L Final     Potassium   Date Value Ref Range Status   06/13/2019 4.2 3.4 - 5.3 mmol/L Final     Chloride   Date Value Ref Range Status   06/13/2019 108 94 - 109 mmol/L Final     Calcium   Date Value Ref Range Status   06/13/2019 8.6 8.5 - 10.1 mg/dL Final     Carbon Dioxide   Date Value Ref Range Status   06/13/2019 27 20 - 32 mmol/L Final     Urea Nitrogen   Date Value Ref Range Status   06/13/2019 13 7 - 30 mg/dL Final     Creatinine   Date Value Ref Range Status   06/13/2019 0.75 0.66 - 1.25 mg/dL Final     GFR Estimate   Date Value Ref Range Status   06/13/2019 >90 >60 mL/min/[1.73_m2] Final     Comment:     Non  GFR Calc  Starting 12/18/2018, serum creatinine based estimated GFR (eGFR) will be   calculated using the Chronic Kidney Disease Epidemiology Collaboration    (CKD-EPI) equation.       Glucose   Date Value Ref Range Status   08/21/2019 152 (H) 70 - 99 mg/dL Final     Comment:     Non Fasting       AST   Date Value Ref Range Status   06/13/2019 17 0 - 45 U/L Final     ALT   Date Value Ref Range Status   06/13/2019 30 0 - 70 U/L Final     Albumin   Date Value Ref Range Status   06/13/2019 3.6 3.4 - 5.0 g/dL Final       Assessment     Gustavo Rogers is a 59 year old male with a h/o 2 episodes of pancreatitis (in 2015 and 2017), diagnosed with diabetes in March 2019. The abdominal CT, from March 2019, showed an atrophied pancreatic neck and body.  C-peptide was quite generous at 3.5, for a blood sugar of 152, in August 2019.  LINDA 65 and anti-islet cell antibodies were negative.  His diabetes has been very well controlled with a maximum dose of metformin and 11 units of Basaglar, daily.    1. Type 2 diabetes  Recommendations:  Check blood sugar fasting and at bedtime.  If the bedtime blood sugar is well controlled, we are going to consider discontinuing insulin.  The patient is going to have the meter records downloaded for my review.  Discussed about the option of adding other hypertensive medications, especially if the A1c is above 6.5%.  The patient is going to change his insurance in January this year.  My first preference would be an SGLT2 inhibitor or a GLP-1 agonist.    2.  Hypercholesterolemia.  Treatment with statin was started in March 2019, 20 mg daily.  Recommended to have a fasting lipid panel done at his convenience.  Counseled on the importance of establishing a regular exercise schedule.    Orders Placed This Encounter   Procedures     Lipid Profile

## 2019-12-03 NOTE — NURSING NOTE
Zackery Rogers's goals for this visit include:   Chief Complaint   Patient presents with     RECHECK     3 month follow up A1C       He requests these members of his care team be copied on today's visit information: Yes    PCP: Kalina Poe    Referring Provider:  No referring provider defined for this encounter.    /85 (BP Location: Right arm, Patient Position: Sitting, Cuff Size: Adult Large)   Pulse 74   Temp 97.9  F (36.6  C) (Oral)   Resp 20   Wt 96.1 kg (211 lb 12.8 oz)   SpO2 95%   BMI 29.71 kg/m      Do you need any medication refills at today's visit? No    Lion Oneill CMA

## 2019-12-17 LAB
CHOLEST SERPL-MCNC: 131 MG/DL
HDLC SERPL-MCNC: 43 MG/DL
LDLC SERPL CALC-MCNC: 75 MG/DL
NONHDLC SERPL-MCNC: 88 MG/DL
TRIGL SERPL-MCNC: 65 MG/DL

## 2019-12-17 PROCEDURE — 80061 LIPID PANEL: CPT | Performed by: INTERNAL MEDICINE

## 2019-12-17 PROCEDURE — 36415 COLL VENOUS BLD VENIPUNCTURE: CPT | Performed by: INTERNAL MEDICINE

## 2019-12-23 DIAGNOSIS — I10 UNCONTROLLED HYPERTENSION: ICD-10-CM

## 2019-12-24 RX ORDER — LISINOPRIL 40 MG/1
TABLET ORAL
Qty: 90 TABLET | Refills: 0 | OUTPATIENT
Start: 2019-12-24

## 2019-12-24 NOTE — TELEPHONE ENCOUNTER
Refill not appropriate.  Rx sent to the requesting pharmacy on 11/12/2019 for a 3 month supply with an additional 0 refills.    Shay Villaseñor, RN, BSN

## 2019-12-30 DIAGNOSIS — E11.9 NEW ONSET TYPE 2 DIABETES MELLITUS (H): ICD-10-CM

## 2019-12-30 RX ORDER — METFORMIN HCL 500 MG
1000 TABLET, EXTENDED RELEASE 24 HR ORAL 2 TIMES DAILY WITH MEALS
Qty: 270 TABLET | Refills: 1 | Status: SHIPPED | OUTPATIENT
Start: 2019-12-30 | End: 2020-04-15

## 2019-12-30 NOTE — TELEPHONE ENCOUNTER
Prescription approved per [facility] Refill Protocol.  John Flynn, RN, BSN

## 2020-02-12 ENCOUNTER — TELEPHONE (OUTPATIENT)
Dept: FAMILY MEDICINE | Facility: OTHER | Age: 60
End: 2020-02-12

## 2020-02-12 DIAGNOSIS — K85.91 NECROTIZING PANCREATITIS: ICD-10-CM

## 2020-02-12 DIAGNOSIS — R14.0 BLOATING: ICD-10-CM

## 2020-02-12 DIAGNOSIS — R10.11 RUQ ABDOMINAL PAIN: Primary | ICD-10-CM

## 2020-02-12 NOTE — TELEPHONE ENCOUNTER
Pressure in the area of his pancreas a little over a week. Pain when he presses on the area. Blood sugar is not changing.   Will put in order for CT of abdomen and patient will call to schedule.  Kalina Poe, MAIK-C

## 2020-02-12 NOTE — TELEPHONE ENCOUNTER
Reason for Call:  Other call back    Detailed comments: Patient called clinic asking for PCP to add an order for a CATscan, he says he has a history with pancreas issues and thinks it's happening again. Process for appts declined x1.     Phone Number Patient can be reached at: Home number on file [phone] (home)    Best Time: any    Can we leave a detailed message on this number? YES    Call taken on 2/12/2020 at 9:17 AM by Kristen Rodriguez

## 2020-02-18 ENCOUNTER — HOSPITAL ENCOUNTER (OUTPATIENT)
Dept: CT IMAGING | Facility: CLINIC | Age: 60
Discharge: HOME | End: 2020-02-18
Attending: STUDENT IN AN ORGANIZED HEALTH CARE EDUCATION/TRAINING PROGRAM | Admitting: STUDENT IN AN ORGANIZED HEALTH CARE EDUCATION/TRAINING PROGRAM
Payer: COMMERCIAL

## 2020-02-18 DIAGNOSIS — R14.0 BLOATING: ICD-10-CM

## 2020-02-18 DIAGNOSIS — R10.11 RUQ ABDOMINAL PAIN: ICD-10-CM

## 2020-02-18 DIAGNOSIS — K85.91 NECROTIZING PANCREATITIS: ICD-10-CM

## 2020-02-18 LAB
CREAT BLD-MCNC: 0.9 MG/DL (ref 0.66–1.25)
GFR SERPL CREATININE-BSD FRML MDRD: 86 ML/MIN/{1.73_M2}

## 2020-02-18 PROCEDURE — 25000125 ZZHC RX 250: Performed by: RADIOLOGY

## 2020-02-18 PROCEDURE — 25000128 H RX IP 250 OP 636: Performed by: RADIOLOGY

## 2020-02-18 PROCEDURE — 82565 ASSAY OF CREATININE: CPT

## 2020-02-18 PROCEDURE — 74177 CT ABD & PELVIS W/CONTRAST: CPT

## 2020-02-18 RX ORDER — IOPAMIDOL 755 MG/ML
500 INJECTION, SOLUTION INTRAVASCULAR ONCE
Status: COMPLETED | OUTPATIENT
Start: 2020-02-18 | End: 2020-02-18

## 2020-02-18 RX ADMIN — IOPAMIDOL 100 ML: 755 INJECTION, SOLUTION INTRAVENOUS at 08:17

## 2020-02-18 RX ADMIN — SODIUM CHLORIDE 60 ML: 9 INJECTION, SOLUTION INTRAVENOUS at 08:17

## 2020-03-26 DIAGNOSIS — E78.2 MIXED HYPERLIPIDEMIA: ICD-10-CM

## 2020-03-26 DIAGNOSIS — Z29.9 PREVENTIVE MEASURE: ICD-10-CM

## 2020-03-26 RX ORDER — SIMVASTATIN 20 MG
20 TABLET ORAL AT BEDTIME
Qty: 90 TABLET | Refills: 0 | Status: SHIPPED | OUTPATIENT
Start: 2020-03-26 | End: 2020-06-23

## 2020-04-09 DIAGNOSIS — I10 UNCONTROLLED HYPERTENSION: ICD-10-CM

## 2020-04-09 RX ORDER — LISINOPRIL 40 MG/1
TABLET ORAL
Qty: 90 TABLET | Refills: 0 | Status: SHIPPED | OUTPATIENT
Start: 2020-04-09 | End: 2020-10-02

## 2020-04-09 RX ORDER — LISINOPRIL 40 MG/1
TABLET ORAL
Qty: 90 TABLET | Refills: 0 | Status: SHIPPED | OUTPATIENT
Start: 2020-04-09 | End: 2020-04-09

## 2020-04-09 NOTE — TELEPHONE ENCOUNTER
Pending Prescriptions:                       Disp   Refills    lisinopril (ZESTRIL) 40 MG tablet [Pharma*90 tab*0            Sig: Take 1 tablet (40 mg) by mouth daily      Prescription approved per [facility] Refill Protocol.    Namrata Amador, MSN, RN    
0 = independent

## 2020-04-15 DIAGNOSIS — E11.9 NEW ONSET TYPE 2 DIABETES MELLITUS (H): ICD-10-CM

## 2020-04-15 RX ORDER — METFORMIN HCL 500 MG
1000 TABLET, EXTENDED RELEASE 24 HR ORAL 2 TIMES DAILY WITH MEALS
Qty: 270 TABLET | Refills: 0 | Status: SHIPPED | OUTPATIENT
Start: 2020-04-15 | End: 2020-07-27

## 2020-04-15 NOTE — TELEPHONE ENCOUNTER
Routing refill request to provider for review/approval because:  Patient needs to be seen because:  Overdue for DM visit    Next 5 appointments (look out 90 days)    Jun 03, 2020  7:15 AM CDT  Return Visit with Yessi Prasad MD, MG ENDO NURSE  [facility] ([facility]) [id number] [place] [id number]  [phone]        Kennedi Tavarez RN, BSN

## 2020-06-22 DIAGNOSIS — E78.2 MIXED HYPERLIPIDEMIA: ICD-10-CM

## 2020-06-23 ENCOUNTER — TELEPHONE (OUTPATIENT)
Dept: ENDOCRINOLOGY | Facility: CLINIC | Age: 60
End: 2020-06-23

## 2020-06-23 RX ORDER — SIMVASTATIN 20 MG
20 TABLET ORAL AT BEDTIME
Qty: 90 TABLET | Refills: 1 | Status: SHIPPED | OUTPATIENT
Start: 2020-06-23 | End: 2020-12-28

## 2020-06-23 NOTE — TELEPHONE ENCOUNTER
Pending Prescriptions:                       Disp   Refills    simvastatin (ZOCOR) 20 MG tablet [Pharmac*90 tab*0            Sig: TAKE 1 TABLET (20 MG) BY MOUTH AT BEDTIME.    Routing refill request to provider for review/approval because:  Labs not current:  Lipids  LOV 3/17/2019    Namrata Amador, MSN, RN

## 2020-06-23 NOTE — TELEPHONE ENCOUNTER
Writer called the patient and offered to change todays (6/23/20) visit to a virtual visit. Patient asked about rescheduling to a day when we are seeing patients in the clinic again. Writer explained that we do not have a set date that we are going to be able to see patients in clinic yet. Patient stated well I guess I don't have any choice. When asked if he could do a video visit, the patient stated I don't have time for this, I am at work and abruptly hung up. Writer was unable to change the visit because unsure of which he wanted to do and was unable to discuss the need of the patients blood sugar readings. Will wait for the patient to call back to address scheduling.    Rosa Keys, [facility]  Adult Endocrinology  [facility]

## 2020-07-09 DIAGNOSIS — I10 UNCONTROLLED HYPERTENSION: ICD-10-CM

## 2020-07-09 NOTE — TELEPHONE ENCOUNTER
Pending Prescriptions:                       Disp   Refills    lisinopril (ZESTRIL) 40 MG tablet [Pharma*90 tab*0            Sig: Take 1 tablet (40 mg) by mouth daily    Needs office visit and BMP lab.    Routed to [facility] Registration.  Tayler Maxwell, BSN, RN, PHN

## 2020-07-10 RX ORDER — LISINOPRIL 40 MG/1
TABLET ORAL
Qty: 90 TABLET | Refills: 0 | OUTPATIENT
Start: 2020-07-10

## 2020-07-10 NOTE — PROGRESS NOTES
Subjective     Zackery Rogers is a 59 year old male who presents to clinic today for the following health issues:    HPI       Diabetes Follow-up      How often are you checking your blood sugar? Not at all, had been doing once daily until machine stopped working    What concerns do you have today about your diabetes? None     Do you have any of these symptoms? (Select all that apply)  No numbness or tingling in feet.  No redness, sores or blisters on feet.  No complaints of excessive thirst.  No reports of blurry vision.  No significant changes to weight.    Have you had a diabetic eye exam in the last 12 months? No        BP Readings from Last 2 Encounters:   12/03/19 134/85   08/21/19 126/77     Hemoglobin A1C (%)   Date Value   12/03/2019 5.9 (A)   08/21/2019 5.8 (A)     LDL Cholesterol Calculated (mg/dL)   Date Value   12/17/2019 75   02/28/2019 127 (H)         Hypertension Follow-up      Do you check your blood pressure regularly outside of the clinic? No     Are you following a low salt diet? No    Are your blood pressures ever more than 140 on the top number (systolic) OR more   than 90 on the bottom number (diastolic), for example 140/90? No    Concern - skin tag on right upper lip  Onset: 2 months     Description:   Bleeds and then scabs over after shaving   This appears to be a filamentous type wart.    Patient Active Problem List   Diagnosis     Advanced directives, counseling/discussion     Swelling of both ankles     Night sweats     History of colonic polyps     Benign essential hypertension     Rosacea     Obesity (BMI 30.0-34.9)     Class 1 obesity due to excess calories with serious comorbidity and body mass index (BMI) of 31.0 to 31.9 in adult     Type 2 diabetes mellitus without complication, without long-term current use of insulin (H)     Skin lesion - right upper lip above the vermillion border     Past Surgical History:   Procedure Laterality Date     COLONOSCOPY N/A 10/1/2018    Procedure:  COLONOSCOPY;  Colonoscopy;  Surgeon: Rajinder Ridley MD;  Location: [facility]     ENDOSCOPIC RETROGRADE CHOLANGIOPANCREATOGRAM, NECROSECTOMY N/A 6/5/2017    Procedure: ENDOSCOPIC RETROGRADE CHOLANGIOPANCREATOGRAM, NECROSECTOMY;  Endoscopic Retrograde Chlangiopancreatogram, Necrosectomy ;  Surgeon: Guru Diana Navarro MD;  Location: [facility] OR     ENDOSCOPIC ULTRASOUND UPPER GASTROINTESTINAL TRACT (GI) N/A 5/19/2017    Procedure: ENDOSCOPIC ULTRASOUND, ESOPHAGOSCOPY / UPPER GASTROINTESTINAL TRACT (GI);  Upper Endoscopic Ultrasound with with cyst gastrostomy, hot axios stent;  Surgeon: Guru Diana Navarro MD;  Location: [facility] OR     NO HISTORY OF SURGERY         Social History     Tobacco Use     Smoking status: Never Smoker     Smokeless tobacco: Never Used   Substance Use Topics     Alcohol use: Yes     Comment: 1 x year     Family History   Problem Relation Age of Onset     Alzheimer Disease Mother      Myocardial Infarction Mother      Cerebrovascular Disease Father      Family History Negative Brother      Family History Negative Sister      Family History Negative Brother      Family History Negative Brother          Current Outpatient Medications   Medication Sig Dispense Refill     aspirin (ASA) 81 MG tablet Take 1 tablet (81 mg) by mouth daily 100 tablet 0     empagliflozin (JARDIANCE) 10 MG TABS tablet Take 1 tablet (10 mg) by mouth daily 90 tablet 3     insulin glargine (BASAGLAR KWIKPEN) 100 UNIT/ML pen Inject 11 Units Subcutaneous At Bedtime 9 mL 3     insulin pen needle (31G X 6 MM) 31G X 6 MM miscellaneous Use 1 pen needles daily or as directed. 100 each 3     lisinopril (ZESTRIL) 40 MG tablet Take 1 tablet (40 mg) by mouth daily 90 tablet 0     metFORMIN (GLUCOPHAGE-XR) 500 MG 24 hr tablet Take 2 tablets (1,000 mg) by mouth 2 times daily (with meals) 270 tablet 0     minocycline (MINOCIN/DYNACIN) 100 MG capsule Take 1 capsule (100 mg) by mouth 2 times daily 180 capsule 3      simvastatin (ZOCOR) 20 MG tablet TAKE 1 TABLET (20 MG) BY MOUTH AT BEDTIME. 90 tablet 1     blood glucose (ONETOUCH VERIO IQ) test strip Use to test blood sugar 3 times daily or as directed.  Ok to substitute alternative if insurance prefers. (Patient not taking: Reported on 7/13/2020) 100 strip prn     blood glucose calibration (ONETOUCH VERIO) solution Use to calibrate blood glucose monitor as needed as directed. (Patient not taking: Reported on 7/13/2020) 1 Bottle 3     blood glucose monitoring (ONE TOUCH DELICA) lancets Use to test blood sugars 3 times daily or as directed. (Patient not taking: Reported on 7/13/2020) 100 each 5     No Known Allergies  Recent Labs   Lab Test 02/18/20  0816 12/17/19  0733 12/03/19 08/21/19 06/13/19  0915 03/07/19  0810 02/28/19  0741 05/19/17  1005 05/18/17  0954 04/26/17  0948   A1C  --   --  5.9* 5.8*  --  12.7*  --   --   --   --    LDL  --  75  --   --   --   --  127*  --   --  88   HDL  --  43  --   --   --   --  39*  --   --  29*   TRIG  --  65  --   --   --   --  96  --   --  83   ALT  --   --   --   --  30  --   --  16 23 22   CR  --   --   --   --  0.75  --  0.84 0.93 1.34* 0.71   GFRESTIMATED 86  --   --   --  >90  --  >90 83 55* >90  Non  GFR Calc     GFRESTBLACK >90  --   --   --  >90  --  >90 >90   GFR Calc   66 >90   GFR Calc     POTASSIUM  --   --   --   --  4.2  --  4.6 3.8 4.5 4.1   TSH  --   --   --   --   --  1.33  --   --   --  1.58      BP Readings from Last 3 Encounters:   07/13/20 122/74   12/03/19 134/85   08/21/19 126/77    Wt Readings from Last 3 Encounters:   07/13/20 98.4 kg (217 lb)   12/03/19 96.1 kg (211 lb 12.8 oz)   08/21/19 94.2 kg (207 lb 9.6 oz)                    Reviewed and updated as needed this visit by Provider         Review of Systems   Constitutional, HEENT, cardiovascular, pulmonary, GI, , musculoskeletal, neuro, skin, endocrine and psych systems are negative, except as otherwise  "noted.      Objective    /74   Pulse 88   Temp 98.1  F (36.7  C) (Temporal)   Resp 14   Ht 1.798 m (5' 10.8\")   Wt 98.4 kg (217 lb)   SpO2 96%   BMI 30.44 kg/m    Body mass index is 30.44 kg/m .  Physical Exam   GENERAL: healthy, alert and no distress  NECK: no adenopathy, no asymmetry, masses, or scars and thyroid normal to palpation  RESP: lungs clear to auscultation - no rales, rhonchi or wheezes  CV: regular rate and rhythm, normal S1 S2, no S3 or S4, no murmur, click or rub, no peripheral edema and peripheral pulses strong  ABDOMEN: soft, nontender, no hepatosplenomegaly, no masses and bowel sounds normal  MS: no gross musculoskeletal defects noted, no edema  FOOT: Diabetic foot exam is within normal limits with exception of a blister to the top of the right foot second digit at the base of the nail.  He declines any manipulation of this.  I advised careful monitoring.    Procedure:  Informed consent is obtained and risk factors discussed to the patients satisfaction.  I answered questions the patient had.  The lesion is approximately 4 mm in rough diameter and protrudes from the surface of the skin by about 8 mm.  It is very columnar in nature.  Tender to the touch.  The area of concern is/are cleansed with betadine 3x's.  The base of the lesion(s) is / are infiltrated with 1% lidocaine local with good effect.  The lesion(s) are removed with electrocautery dissection and placed in pathology containers which have been labeled appropriately.  Cautery is used to obtain hemostasis.  Surgical sites are cleansed and dressed in the usual fashion.  Patient tolerated the procedure well.    Diagnostic Test Results:  Labs reviewed in Epic  No results found for this or any previous visit (from the past 24 hour(s)).        Assessment & Plan     1. Type 2 diabetes mellitus without complication, without long-term current use of insulin (H)  Awaiting results at time of dictation.  Will forward to PCP for review " "and to his endocrinologist as well.  - Albumin Random Urine Quantitative with Creat Ratio  - HEMOGLOBIN A1C  - Comprehensive metabolic panel  - FOOT EXAM    2. Skin lesion - right upper lip above the vermillion border  At this point time I suspect this to be a filamentous type wart.  Awaiting pathology.  - Surgical pathology exam     BMI:   Estimated body mass index is 30.44 kg/m  as calculated from the following:    Height as of this encounter: 1.798 m (5' 10.8\").    Weight as of this encounter: 98.4 kg (217 lb).   Weight management plan: Discussed healthy diet and exercise guidelines        Work on weight loss  Regular exercise  Return in about 4 weeks (around 8/10/2020) for recheck of current condition, if symptoms do not improve.    Marek Tadeo PA-C  [facility]"

## 2020-07-13 ENCOUNTER — OFFICE VISIT (OUTPATIENT)
Dept: FAMILY MEDICINE | Facility: OTHER | Age: 60
End: 2020-07-13
Payer: COMMERCIAL

## 2020-07-13 VITALS
DIASTOLIC BLOOD PRESSURE: 74 MMHG | HEART RATE: 88 BPM | RESPIRATION RATE: 14 BRPM | HEIGHT: 71 IN | TEMPERATURE: 98.1 F | WEIGHT: 217 LBS | BODY MASS INDEX: 30.38 KG/M2 | OXYGEN SATURATION: 96 % | SYSTOLIC BLOOD PRESSURE: 122 MMHG

## 2020-07-13 DIAGNOSIS — E11.9 TYPE 2 DIABETES MELLITUS WITHOUT COMPLICATION, WITHOUT LONG-TERM CURRENT USE OF INSULIN (H): Primary | ICD-10-CM

## 2020-07-13 DIAGNOSIS — L98.9 SKIN LESION: ICD-10-CM

## 2020-07-13 LAB
ALBUMIN SERPL-MCNC: 3.6 G/DL (ref 3.4–5)
ALP SERPL-CCNC: 99 U/L (ref 40–150)
ALT SERPL W P-5'-P-CCNC: 29 U/L (ref 0–70)
ANION GAP SERPL CALCULATED.3IONS-SCNC: 5 MMOL/L (ref 3–14)
AST SERPL W P-5'-P-CCNC: 20 U/L (ref 0–45)
BILIRUB SERPL-MCNC: 0.5 MG/DL (ref 0.2–1.3)
BUN SERPL-MCNC: 13 MG/DL (ref 7–30)
CALCIUM SERPL-MCNC: 8.3 MG/DL (ref 8.5–10.1)
CHLORIDE SERPL-SCNC: 107 MMOL/L (ref 94–109)
CO2 SERPL-SCNC: 28 MMOL/L (ref 20–32)
CREAT SERPL-MCNC: 0.92 MG/DL (ref 0.66–1.25)
CREAT UR-MCNC: 184 MG/DL
GFR SERPL CREATININE-BSD FRML MDRD: 90 ML/MIN/{1.73_M2}
GLUCOSE SERPL-MCNC: 102 MG/DL (ref 70–99)
HBA1C MFR BLD: 6.1 % (ref 0–5.6)
MICROALBUMIN UR-MCNC: 10 MG/L
MICROALBUMIN/CREAT UR: 5.35 MG/G CR (ref 0–17)
POTASSIUM SERPL-SCNC: 3.9 MMOL/L (ref 3.4–5.3)
PROT SERPL-MCNC: 6.3 G/DL (ref 6.8–8.8)
SODIUM SERPL-SCNC: 140 MMOL/L (ref 133–144)

## 2020-07-13 PROCEDURE — 99207 C FOOT EXAM  NO CHARGE: CPT | Performed by: PHYSICIAN ASSISTANT

## 2020-07-13 PROCEDURE — 17110 DESTRUCTION B9 LES UP TO 14: CPT | Performed by: PHYSICIAN ASSISTANT

## 2020-07-13 PROCEDURE — 88305 TISSUE EXAM BY PATHOLOGIST: CPT | Performed by: PHYSICIAN ASSISTANT

## 2020-07-13 PROCEDURE — 36415 COLL VENOUS BLD VENIPUNCTURE: CPT | Performed by: PHYSICIAN ASSISTANT

## 2020-07-13 PROCEDURE — 82043 UR ALBUMIN QUANTITATIVE: CPT | Performed by: PHYSICIAN ASSISTANT

## 2020-07-13 PROCEDURE — 80053 COMPREHEN METABOLIC PANEL: CPT | Performed by: PHYSICIAN ASSISTANT

## 2020-07-13 PROCEDURE — 83036 HEMOGLOBIN GLYCOSYLATED A1C: CPT | Performed by: PHYSICIAN ASSISTANT

## 2020-07-13 PROCEDURE — 99214 OFFICE O/P EST MOD 30 MIN: CPT | Mod: 25 | Performed by: PHYSICIAN ASSISTANT

## 2020-07-13 ASSESSMENT — MIFFLIN-ST. JEOR: SCORE: 1813.26

## 2020-07-15 LAB — COPATH REPORT: NORMAL

## 2020-07-21 DIAGNOSIS — Z29.9 PREVENTIVE MEASURE: ICD-10-CM

## 2020-07-21 RX ORDER — ASPIRIN 81 MG/1
TABLET, CHEWABLE ORAL
Qty: 100 TABLET | Refills: 2 | Status: SHIPPED | OUTPATIENT
Start: 2020-07-21 | End: 2021-09-08

## 2020-07-21 NOTE — TELEPHONE ENCOUNTER
Pending Prescriptions:                       Disp   Refills    aspirin (ASA) 81 MG chewable tablet [Phar*100 ta*2            Sig: CHEW AND SWALLOW ONE TABLET BY MOUTH ONE TIME           DAILY     Prescription approved per [facility] Refill Protocol.    Namrata Amador, MSN, RN

## 2020-07-27 DIAGNOSIS — E11.9 NEW ONSET TYPE 2 DIABETES MELLITUS (H): ICD-10-CM

## 2020-07-27 RX ORDER — METFORMIN HCL 500 MG
1000 TABLET, EXTENDED RELEASE 24 HR ORAL 2 TIMES DAILY WITH MEALS
Qty: 270 TABLET | Refills: 1 | Status: SHIPPED | OUTPATIENT
Start: 2020-07-27 | End: 2020-12-03

## 2020-07-27 NOTE — TELEPHONE ENCOUNTER
Pending Prescriptions:                       Disp   Refills    metFORMIN (GLUCOPHAGE-XR) 500 MG 24 hr ta*270 ta*1            Sig: Take 2 tablets (1,000 mg) by mouth 2 times daily           (with meals)    Prescription approved per [facility] [place]ill Protocol.    Namrata Amador, MSN, RN

## 2020-07-29 DIAGNOSIS — E11.65 UNCONTROLLED TYPE 2 DIABETES MELLITUS WITH HYPERGLYCEMIA (H): ICD-10-CM

## 2020-07-30 NOTE — TELEPHONE ENCOUNTER
Will forward to CHAVEZ Smith to review 7/31/2020.    Acacia Espinosa LPN  Diabetes Clinic Coordinator   Adult Endocrinology and Pediatric Specialty Clinics  [facility]

## 2020-07-30 NOTE — TELEPHONE ENCOUNTER
Damionaglannita Hines Subcutaneous Solution Pen-injector 100 UNIT/ML       Last Written Prescription Date:  11-8-19  Last Fill Quantity: 9 ml,   # refills: 3  Last Office Visit : 12-3-19  Future Office visit:  none    Routing refill request to provider for review/approval because:  Insulin - refilled per clinic

## 2020-07-31 RX ORDER — INSULIN GLARGINE 100 [IU]/ML
INJECTION, SOLUTION SUBCUTANEOUS
Qty: 9 ML | Refills: 0 | Status: SHIPPED | OUTPATIENT
Start: 2020-07-31 | End: 2020-11-18

## 2020-10-01 DIAGNOSIS — I10 UNCONTROLLED HYPERTENSION: ICD-10-CM

## 2020-10-02 RX ORDER — LISINOPRIL 40 MG/1
TABLET ORAL
Qty: 90 TABLET | Refills: 1 | Status: SHIPPED | OUTPATIENT
Start: 2020-10-02 | End: 2020-12-28

## 2020-11-18 DIAGNOSIS — E11.65 UNCONTROLLED TYPE 2 DIABETES MELLITUS WITH HYPERGLYCEMIA (H): ICD-10-CM

## 2020-11-18 NOTE — LETTER
[facility]  [address] [place] [id number]  Phone: [phone]  December 1, 2020      Zackery Rogers  [address] [facility] [address]      Dear Zackery,    We care about your health and have reviewed your health plan including your medical conditions, medications, and lab results.  Based on this review, it is recommended that you follow up regarding the following health topic(s):  -Diabetes  -High Blood Pressure  -Wellness (Physical) Visit     We recommend you take the following action(s):  -schedule a FOLLOWUP OFFICE APPOINTMENT.  We will perform the following labs:  Lipids (fasting cholesterol - nothing to eat except water and/or meds for 8-10 hours) and HIV Screening.  -schedule a WELLNESS (Physical) APPOINTMENT.  We will perform the following labs: Lipids (fasting cholesterol - nothing to eat except water and/or meds for 8-10 hours) and HIV Screening.  -Diabetic Eye Exam Referral     Please call us at the [facility] - [phone] (or use [employer]) to address the above recommendations.     Thank you for trusting [facility] and we appreciate the opportunity to serve you.  We look forward to supporting your healthcare needs in the future.    Healthy Regards,    Your Health Care Team  [facility]

## 2020-11-19 RX ORDER — INSULIN GLARGINE 100 [IU]/ML
INJECTION, SOLUTION SUBCUTANEOUS
Qty: 15 ML | Refills: 0 | Status: SHIPPED | OUTPATIENT
Start: 2020-11-19 | End: 2020-12-28

## 2020-11-19 NOTE — TELEPHONE ENCOUNTER
Christiane Hines Subcutaneous Solution Pen-injector 100 UNIT/ML    Last Written Prescription Date:  7/31/20  Last Fill Quantity: 9 ml ,   # refills: 0  Last Office Visit : 12/3/2019  Future Office visit:  None    Routing refill request to provider for review/approval because:   Insulin - refilled per clinic  07/13/20  1621    A1C 6.1*

## 2020-11-19 NOTE — TELEPHONE ENCOUNTER
Pending Prescriptions:                       Disp   Refills    insulin glargine (BASAGLAR KWIKPEN) 100 U*15 mL  0            Sig: Inject 11 Units under the skin (Subcutaneously)           at bedtime    Medication is being filled for 1 time jaspreet refill only due to:  Patient is due for DM check.     Please call and help schedule.  Thank you!  Rina Hitchcock RN  November 19, 2020

## 2020-11-19 NOTE — TELEPHONE ENCOUNTER
Appears diabetes is being managed through primary at [facility].    Will forward to [facility].    Acacia Espinosa LPN  Diabetes Clinic Coordinator   Adult Endocrinology and Pediatric Specialty Clinics  [facility]

## 2020-12-03 DIAGNOSIS — E11.9 NEW ONSET TYPE 2 DIABETES MELLITUS (H): ICD-10-CM

## 2020-12-03 RX ORDER — METFORMIN HCL 500 MG
1000 TABLET, EXTENDED RELEASE 24 HR ORAL 2 TIMES DAILY WITH MEALS
Qty: 270 TABLET | Refills: 0 | Status: SHIPPED | OUTPATIENT
Start: 2020-12-03 | End: 2020-12-28

## 2020-12-03 NOTE — TELEPHONE ENCOUNTER
Pending Prescriptions:                       Disp   Refills    metFORMIN (GLUCOPHAGE-XR) 500 MG 24 hr ta*270 ta*0            Sig: Take 2 tablets (1,000 mg) by mouth 2 times daily           (with meals)    Medication is being filled for 1 time jaspreet refill only due to:  Patient is due for diabetes follow up before out    Please call and help schedule.  Thank you!

## 2020-12-23 NOTE — PROGRESS NOTES
Subjective     Zackery Rogers is a 60 year old male who presents to clinic today for the following health issues:    History of Present Illness       He eats 2-3 servings of fruits and vegetables daily.He consumes 0 sweetened beverage(s) daily.He exercises with enough effort to increase his heart rate 9 or less minutes per day.  He exercises with enough effort to increase his heart rate 5 days per week.   He is taking medications regularly.           Diabetes Follow-up    How often are you checking your blood sugar? A few times a week, when he gets home from work. Sometimes eats something in the afternoon. Blood sugars are typically around 130.   What time of day are you checking your blood sugars (select all that apply)?  at night  Have you had any blood sugars above 200?  No  Have you had any blood sugars below 70?  No    What symptoms do you notice when your blood sugar is low?  None and Not applicable    What concerns do you have today about your diabetes? None     Do you have any of these symptoms? (Select all that apply)  No numbness or tingling in feet.  No redness, sores or blisters on feet.  No complaints of excessive thirst.  No reports of blurry vision.  No significant changes to weight.    Have you had a diabetic eye exam in the last 12 months? No    Empagliflozin was not covered by insurance so he stayed on insulin.     Does not check blood pressures outside of clinic. Taking lisinopril daily. No side effects.    Taking statin daily. No side effects.     BP Readings from Last 2 Encounters:   12/28/20 130/82   07/13/20 122/74     Hemoglobin A1C (%)   Date Value   07/13/2020 6.1 (H)   12/03/2019 5.9 (A)     LDL Cholesterol Calculated (mg/dL)   Date Value   12/17/2019 75   02/28/2019 127 (H)           Review of Systems   Constitutional, HEENT, cardiovascular, pulmonary, gi and gu systems are negative, except as otherwise noted.      Objective    /82   Pulse 84   Temp 97  F (36.1  C) (Temporal)    Wt 212 lb (96.2 kg)   SpO2 97%   BMI 29.74 kg/m    Body mass index is 29.74 kg/m .  Physical Exam   GENERAL: healthy, alert and no distress  NECK: no adenopathy, no asymmetry, masses, or scars and thyroid normal to palpation  RESP: lungs clear to auscultation - no rales, rhonchi or wheezes  CV: regular rate and rhythm, normal S1 S2, no S3 or S4, no murmur, click or rub, no peripheral edema   MS: no gross musculoskeletal defects noted, no edema  SKIN: no suspicious lesions or rashes  NEURO: Normal strength and tone, mentation intact and speech normal  PSYCH: mentation appears normal, affect normal/bright  Diabetic foot exam: normal DP and PT pulses, no trophic changes or ulcerative lesions and normal sensory exam    Results for orders placed or performed in visit on 12/28/20 (from the past 24 hour(s))   Lipid panel reflex to direct LDL Fasting   Result Value Ref Range    Cholesterol 155 <200 mg/dL    Triglycerides 125 <150 mg/dL    HDL Cholesterol 38 (L) >39 mg/dL    LDL Cholesterol Calculated 92 <100 mg/dL    Non HDL Cholesterol 117 <130 mg/dL   Comprehensive metabolic panel (BMP + Alb, Alk Phos, ALT, AST, Total. Bili, TP)   Result Value Ref Range    Sodium 145 (H) 133 - 144 mmol/L    Potassium 4.2 3.4 - 5.3 mmol/L    Chloride 112 (H) 94 - 109 mmol/L    Carbon Dioxide 29 20 - 32 mmol/L    Anion Gap 4 3 - 14 mmol/L    Glucose 137 (H) 70 - 99 mg/dL    Urea Nitrogen 18 7 - 30 mg/dL    Creatinine 0.90 0.66 - 1.25 mg/dL    GFR Estimate >90 >60 mL/min/[1.73_m2]    GFR Estimate If Black >90 >60 mL/min/[1.73_m2]    Calcium 8.8 8.5 - 10.1 mg/dL    Bilirubin Total 0.5 0.2 - 1.3 mg/dL    Albumin 3.7 3.4 - 5.0 g/dL    Protein Total 6.6 (L) 6.8 - 8.8 g/dL    Alkaline Phosphatase 109 40 - 150 U/L    ALT 30 0 - 70 U/L    AST 12 0 - 45 U/L   Hemoglobin A1c   Result Value Ref Range    Hemoglobin A1C 6.1 (H) 0 - 5.6 %           Assessment & Plan     Diabetes mellitus due to underlying condition without complication, unspecified  whether long term insulin use (H)  Stable. Continue current medication(s) and dose(s). Follow up in 6 months.   - insulin glargine (BASAGLAR KWIKPEN) 100 UNIT/ML pen; Inject 14 Units under the skin (Subcutaneously) at bedtime  - insulin pen needle (31G X 6 MM) 31G X 6 MM miscellaneous; Use 1 pen needles daily or as directed.  - metFORMIN (GLUCOPHAGE-XR) 500 MG 24 hr tablet; Take 2 tablets (1,000 mg) by mouth 2 times daily (with meals)  - Comprehensive metabolic panel (BMP + Alb, Alk Phos, ALT, AST, Total. Bili, TP)  - Hemoglobin A1c    Essential hypertension  Stable. Continue current medication(s) and dose(s).   - lisinopril (ZESTRIL) 40 MG tablet; Take 1 tablet (40 mg) by mouth daily    Mixed hyperlipidemia  Stable. Continue current medication(s) and dose(s).   - Lipid panel reflex to direct LDL Fasting  - simvastatin (ZOCOR) 20 MG tablet; Take 1 tablet (20 mg) by mouth At Bedtime    Class 1 obesity due to excess calories with serious comorbidity and body mass index (BMI) of 31.0 to 31.9 in adult  Recommend regular exercise and continued healthy eating.     Need for prophylactic vaccination and inoculation against influenza  - INFLUENZA QUAD, RECOMBINANT, P-FREE (RIV4) (FLUBLOCK) [47838]            No follow-ups on file.    DINH Somers [facility]

## 2020-12-28 ENCOUNTER — OFFICE VISIT (OUTPATIENT)
Dept: FAMILY MEDICINE | Facility: OTHER | Age: 60
End: 2020-12-28
Payer: COMMERCIAL

## 2020-12-28 VITALS
DIASTOLIC BLOOD PRESSURE: 82 MMHG | BODY MASS INDEX: 29.74 KG/M2 | HEART RATE: 84 BPM | OXYGEN SATURATION: 97 % | TEMPERATURE: 97 F | WEIGHT: 212 LBS | SYSTOLIC BLOOD PRESSURE: 130 MMHG

## 2020-12-28 DIAGNOSIS — E08.9 DIABETES MELLITUS DUE TO UNDERLYING CONDITION WITHOUT COMPLICATION, UNSPECIFIED WHETHER LONG TERM INSULIN USE (H): Primary | ICD-10-CM

## 2020-12-28 DIAGNOSIS — Z23 NEED FOR PROPHYLACTIC VACCINATION AND INOCULATION AGAINST INFLUENZA: ICD-10-CM

## 2020-12-28 DIAGNOSIS — E66.811 CLASS 1 OBESITY DUE TO EXCESS CALORIES WITH SERIOUS COMORBIDITY AND BODY MASS INDEX (BMI) OF 31.0 TO 31.9 IN ADULT: ICD-10-CM

## 2020-12-28 DIAGNOSIS — I10 ESSENTIAL HYPERTENSION: ICD-10-CM

## 2020-12-28 DIAGNOSIS — E66.09 CLASS 1 OBESITY DUE TO EXCESS CALORIES WITH SERIOUS COMORBIDITY AND BODY MASS INDEX (BMI) OF 31.0 TO 31.9 IN ADULT: ICD-10-CM

## 2020-12-28 DIAGNOSIS — E78.2 MIXED HYPERLIPIDEMIA: ICD-10-CM

## 2020-12-28 LAB
ALBUMIN SERPL-MCNC: 3.7 G/DL (ref 3.4–5)
ALP SERPL-CCNC: 109 U/L (ref 40–150)
ALT SERPL W P-5'-P-CCNC: 30 U/L (ref 0–70)
ANION GAP SERPL CALCULATED.3IONS-SCNC: 4 MMOL/L (ref 3–14)
AST SERPL W P-5'-P-CCNC: 12 U/L (ref 0–45)
BILIRUB SERPL-MCNC: 0.5 MG/DL (ref 0.2–1.3)
BUN SERPL-MCNC: 18 MG/DL (ref 7–30)
CALCIUM SERPL-MCNC: 8.8 MG/DL (ref 8.5–10.1)
CHLORIDE SERPL-SCNC: 112 MMOL/L (ref 94–109)
CHOLEST SERPL-MCNC: 155 MG/DL
CO2 SERPL-SCNC: 29 MMOL/L (ref 20–32)
CREAT SERPL-MCNC: 0.9 MG/DL (ref 0.66–1.25)
GFR SERPL CREATININE-BSD FRML MDRD: >90 ML/MIN/{1.73_M2}
GLUCOSE SERPL-MCNC: 137 MG/DL (ref 70–99)
HBA1C MFR BLD: 6.1 % (ref 0–5.6)
HDLC SERPL-MCNC: 38 MG/DL
LDLC SERPL CALC-MCNC: 92 MG/DL
NONHDLC SERPL-MCNC: 117 MG/DL
POTASSIUM SERPL-SCNC: 4.2 MMOL/L (ref 3.4–5.3)
PROT SERPL-MCNC: 6.6 G/DL (ref 6.8–8.8)
SODIUM SERPL-SCNC: 145 MMOL/L (ref 133–144)
TRIGL SERPL-MCNC: 125 MG/DL

## 2020-12-28 PROCEDURE — 80061 LIPID PANEL: CPT | Performed by: STUDENT IN AN ORGANIZED HEALTH CARE EDUCATION/TRAINING PROGRAM

## 2020-12-28 PROCEDURE — 90472 IMMUNIZATION ADMIN EACH ADD: CPT | Performed by: STUDENT IN AN ORGANIZED HEALTH CARE EDUCATION/TRAINING PROGRAM

## 2020-12-28 PROCEDURE — 90471 IMMUNIZATION ADMIN: CPT | Performed by: STUDENT IN AN ORGANIZED HEALTH CARE EDUCATION/TRAINING PROGRAM

## 2020-12-28 PROCEDURE — 90732 PPSV23 VACC 2 YRS+ SUBQ/IM: CPT | Performed by: STUDENT IN AN ORGANIZED HEALTH CARE EDUCATION/TRAINING PROGRAM

## 2020-12-28 PROCEDURE — 83036 HEMOGLOBIN GLYCOSYLATED A1C: CPT | Performed by: STUDENT IN AN ORGANIZED HEALTH CARE EDUCATION/TRAINING PROGRAM

## 2020-12-28 PROCEDURE — 90682 RIV4 VACC RECOMBINANT DNA IM: CPT | Performed by: STUDENT IN AN ORGANIZED HEALTH CARE EDUCATION/TRAINING PROGRAM

## 2020-12-28 PROCEDURE — 99214 OFFICE O/P EST MOD 30 MIN: CPT | Mod: 25 | Performed by: STUDENT IN AN ORGANIZED HEALTH CARE EDUCATION/TRAINING PROGRAM

## 2020-12-28 PROCEDURE — 36415 COLL VENOUS BLD VENIPUNCTURE: CPT | Performed by: STUDENT IN AN ORGANIZED HEALTH CARE EDUCATION/TRAINING PROGRAM

## 2020-12-28 PROCEDURE — 80053 COMPREHEN METABOLIC PANEL: CPT | Performed by: STUDENT IN AN ORGANIZED HEALTH CARE EDUCATION/TRAINING PROGRAM

## 2020-12-28 RX ORDER — METFORMIN HCL 500 MG
1000 TABLET, EXTENDED RELEASE 24 HR ORAL 2 TIMES DAILY WITH MEALS
Qty: 360 TABLET | Refills: 1 | Status: SHIPPED | OUTPATIENT
Start: 2020-12-28 | End: 2021-08-25

## 2020-12-28 RX ORDER — LISINOPRIL 40 MG/1
TABLET ORAL
Qty: 90 TABLET | Refills: 1 | Status: SHIPPED | OUTPATIENT
Start: 2020-12-28 | End: 2021-10-04

## 2020-12-28 RX ORDER — SIMVASTATIN 20 MG
20 TABLET ORAL AT BEDTIME
Qty: 90 TABLET | Refills: 1 | Status: SHIPPED | OUTPATIENT
Start: 2020-12-28 | End: 2021-10-04

## 2020-12-28 RX ORDER — INSULIN GLARGINE 100 [IU]/ML
INJECTION, SOLUTION SUBCUTANEOUS
Qty: 15 ML | Refills: 1 | Status: SHIPPED | OUTPATIENT
Start: 2020-12-28 | End: 2021-09-08

## 2020-12-28 ASSESSMENT — PAIN SCALES - GENERAL: PAINLEVEL: NO PAIN (0)

## 2021-01-15 ENCOUNTER — HEALTH MAINTENANCE LETTER (OUTPATIENT)
Age: 61
End: 2021-01-15

## 2021-04-27 ENCOUNTER — OFFICE VISIT (OUTPATIENT)
Dept: FAMILY MEDICINE | Facility: CLINIC | Age: 61
End: 2021-04-27
Payer: COMMERCIAL

## 2021-04-27 VITALS
SYSTOLIC BLOOD PRESSURE: 140 MMHG | BODY MASS INDEX: 31.2 KG/M2 | HEIGHT: 70 IN | OXYGEN SATURATION: 97 % | DIASTOLIC BLOOD PRESSURE: 80 MMHG | HEART RATE: 88 BPM | WEIGHT: 217.9 LBS | TEMPERATURE: 98.3 F | RESPIRATION RATE: 16 BRPM

## 2021-04-27 DIAGNOSIS — L02.33 CARBUNCLE AND FURUNCLE OF BUTTOCK: Primary | ICD-10-CM

## 2021-04-27 PROCEDURE — 99213 OFFICE O/P EST LOW 20 MIN: CPT | Performed by: FAMILY MEDICINE

## 2021-04-27 RX ORDER — SULFAMETHOXAZOLE/TRIMETHOPRIM 800-160 MG
1 TABLET ORAL 2 TIMES DAILY
Qty: 14 TABLET | Refills: 0 | Status: SHIPPED | OUTPATIENT
Start: 2021-04-27 | End: 2021-05-04

## 2021-04-27 ASSESSMENT — MIFFLIN-ST. JEOR: SCORE: 1793.39

## 2021-04-27 NOTE — PATIENT INSTRUCTIONS
Patient Education     Understanding Carbuncles    A carbuncle ([id number]-myv-[id number]) is a painful boil under the skin. It happens when a group of hair follicles become infected. Follicles are the tiny holes from which hair grows out of your skin.  What causes carbuncles?  A carbuncle is caused by an infection with bacteria, usually Staphylococcus aureus. They are common on areas of the body where friction and sweat occur. They usually appear on the back of the neck, back, and thighs. This type of infection can also happen when the skin is injured, such as by a cut or bug bite.  The bacteria that causes carbuncles can spread easily from person to person. People at higher risk for boils are those with diabetes or a weak immune system. Drug users who use needles are also more likely to get them.  Symptoms of carbuncles  A carbuncle starts as a small painful bump. But it can grow quickly. It may become:    Red    Swollen    Tender  Carbuncles may ooze pus. They may also cause fever and a general feeling of illness.  Treatment for carbuncles  A carbuncle may heal on its own in a few weeks. But the pus within it needs to come out first. Treatment options include:    Warm compress. Putting a warm, wet washcloth on the boil will help the pus drain out. You should never try to pop a boil. That can cause the infection to spread.    Surgical drainage. If the boil doesn t drain on its own, your healthcare provider may need to cut into it.    Antibiotics. In some cases, oral antibiotics may be prescribed to fight the infection, especially if the carbuncle returns. You will likely have to take the medicine for 5 to 7 days. You may need to take it longer for a severe case.    Good hygiene. Proper handwashing can prevent boils from spreading and coming back. Also wash things that have been in contact with the carbuncle in hot water. This includes items such as clothing and towels.  Complications of carbuncles  The main complication of a  carbuncle is the spreading of the infection. The bacteria can infect the heart and bone. It can also lead to septic shock, an infection of the entire body.  When to call your healthcare provider  Call your healthcare provider right away if you have any of these:    Fever of 100.4 F (38 C) or higher, or as directed    Redness, swelling, or fluid leaking from a carbuncle that gets worse    Pain that gets worse    Symptoms that don t get better, or get worse    New symptoms  Gerardo last reviewed this educational content on 6/1/2019 2000-2021 [employer]. All rights reserved. This information is not intended as a substitute for professional medical care. Always follow your healthcare professional's instructions.

## 2021-04-27 NOTE — PROGRESS NOTES
"    Assessment & Plan     Carbuncle and furuncle of buttock  Warm compress to affected area twice daily for 5-7 days  - sulfamethoxazole-trimethoprim (BACTRIM DS) 800-160 MG tablet; Take 1 tablet by mouth 2 times daily for 7 days      20 minutes spent on the date of the encounter doing chart review, history and exam, documentation and further activities per the note       No follow-ups on file.    Lenore Slater MD  [facility] HATTIE Chen is a 60 year old who presents for the following health issues  accompanied by his Self:    HPI     Skin lesion to the left gluteal cleft   Onset: 2 days ago  Description: ?pimple, cyst to skin of left gluteal cleft.  Intensity:   Progression of Symptoms:    Accompanying Signs & Symptoms: No discharge, can be uncomfortable when he is seated.  Previous history of similar problem: None  Precipitating factors:        Worsened by:   Alleviating factors:        Improved by:   Therapies tried and outcome:  none         Review of Systems   Constitutional, HEENT, cardiovascular, pulmonary, gi and gu systems are negative, except as otherwise noted.      Objective    BP (!) 140/80   Pulse 88   Temp 98.3  F (36.8  C) (Temporal)   Resp 16   Ht 1.768 m (5' 9.61\")   Wt 98.8 kg (217 lb 14.4 oz)   SpO2 97%   BMI 31.62 kg/m    Body mass index is 31.62 kg/m .  Physical Exam   GENERAL: healthy, alert and no distress  SKIN: Carbuncle skin of left gluteal cleft.          "

## 2021-07-02 ENCOUNTER — TRANSFERRED RECORDS (OUTPATIENT)
Dept: HEALTH INFORMATION MANAGEMENT | Facility: CLINIC | Age: 61
End: 2021-07-02

## 2021-07-02 LAB — RETINOPATHY: NEGATIVE

## 2021-07-11 ENCOUNTER — HEALTH MAINTENANCE LETTER (OUTPATIENT)
Age: 61
End: 2021-07-11

## 2021-08-24 DIAGNOSIS — E08.9 DIABETES MELLITUS DUE TO UNDERLYING CONDITION WITHOUT COMPLICATION, UNSPECIFIED WHETHER LONG TERM INSULIN USE (H): ICD-10-CM

## 2021-08-24 NOTE — LETTER
[facility]  [address]  [place] [address]  Phone: [phone]        August 31, 2021      Zackery S[place] Sue                                                                                                                                [id number] [facility] [address]            Dear Mr. Rogers,    We are concerned about your health care.  We recently provided you with a medication refill.  Many medications require routine follow-up with your Doctor.      At this time we ask that: You schedule a routine office visit with your physician to follow your medications. Please call the clinic at [phone] option 1 to schedule.     Your prescription: Has been refilled for 1 time so you may have time for the above noted follow-up.      Thank you,      Kalina Poe [employer]  Care Team

## 2021-08-25 RX ORDER — METFORMIN HCL 500 MG
1000 TABLET, EXTENDED RELEASE 24 HR ORAL 2 TIMES DAILY WITH MEALS
Qty: 120 TABLET | Refills: 0 | Status: SHIPPED | OUTPATIENT
Start: 2021-08-25 | End: 2021-09-08

## 2021-08-25 NOTE — TELEPHONE ENCOUNTER
Patient due for follow up visit for diabetes. Please call to set up appointment. I sent in one month refill.  Fallon Poe, CNP

## 2021-08-25 NOTE — TELEPHONE ENCOUNTER
Pending Prescriptions:                       Disp   Refills    metFORMIN (GLUCOPHAGE-XR) 500 MG 24 hr tab*360 ta*0        Sig: Take 2 tablets (1,000 mg) by mouth 2 times daily           (with meals)    Routing refill request to provider for review/approval because:  Labs not current:  A1c  Lab Results   Component Value Date    A1C 6.1 12/28/2020    A1C 6.1 07/13/2020    A1C 5.9 12/03/2019    A1C 5.8 08/21/2019    A1C 12.7 03/07/2019

## 2021-08-27 NOTE — TELEPHONE ENCOUNTER
LM to call back to relay message below and get patient set up for appt.    Chloe Gage on 8/27/2021 at 3:17 PM

## 2021-09-05 ENCOUNTER — HEALTH MAINTENANCE LETTER (OUTPATIENT)
Age: 61
End: 2021-09-05

## 2021-09-06 DIAGNOSIS — E08.9 DIABETES MELLITUS DUE TO UNDERLYING CONDITION WITHOUT COMPLICATION, UNSPECIFIED WHETHER LONG TERM INSULIN USE (H): ICD-10-CM

## 2021-09-06 DIAGNOSIS — Z29.9 PREVENTIVE MEASURE: ICD-10-CM

## 2021-09-08 RX ORDER — INSULIN GLARGINE 100 [IU]/ML
INJECTION, SOLUTION SUBCUTANEOUS
Qty: 15 ML | Refills: 0 | Status: SHIPPED | OUTPATIENT
Start: 2021-09-08 | End: 2021-10-04

## 2021-09-08 RX ORDER — METFORMIN HCL 500 MG
1000 TABLET, EXTENDED RELEASE 24 HR ORAL 2 TIMES DAILY WITH MEALS
Qty: 60 TABLET | Refills: 0 | Status: SHIPPED | OUTPATIENT
Start: 2021-09-08 | End: 2021-10-04

## 2021-09-08 RX ORDER — ASPIRIN 81 MG/1
TABLET, CHEWABLE ORAL
Qty: 100 TABLET | Refills: 0 | Status: SHIPPED | OUTPATIENT
Start: 2021-09-08 | End: 2021-10-04

## 2021-09-08 NOTE — TELEPHONE ENCOUNTER
Pending Prescriptions:                       Disp   Refills    metFORMIN (GLUCOPHAGE-XR) 500 MG 24 hr tab*120 ta*0        Sig: Take 2 tablets (1,000 mg) by mouth 2 times daily           (with meals).    aspirin (ASA) 81 MG chewable tablet [Pharm*100 ta*0        Sig: CHEW AND SWALLOW ONE TABLET BY MOUTH ONE TIME DAILY     insulin glargine (BASAGLAR KWIKPEN) 100 UN*15 mL  0        Sig: Inject 14 Units under the skin (Subcutaneously) at           bedtime    Routing refill request to provider for review/approval because:  Labs out of range:    BP Readings from Last 3 Encounters:   04/27/21 (!) 140/80   12/28/20 130/82   07/13/20 122/74     Lab Results   Component Value Date    A1C 6.1 12/28/2020    A1C 6.1 07/13/2020    A1C 5.9 12/03/2019    A1C 5.8 08/21/2019    A1C 12.7 03/07/2019

## 2021-09-09 NOTE — TELEPHONE ENCOUNTER
Patient is overdue for diabetes follow-up.  I sent in one-time refill of metformin.  Please call to tell patient to schedule an appointment.  Fallon Poe, CNP

## 2021-09-10 ENCOUNTER — MYC REFILL (OUTPATIENT)
Dept: FAMILY MEDICINE | Facility: OTHER | Age: 61
End: 2021-09-10

## 2021-09-10 DIAGNOSIS — Z29.9 PREVENTIVE MEASURE: ICD-10-CM

## 2021-09-10 DIAGNOSIS — E08.9 DIABETES MELLITUS DUE TO UNDERLYING CONDITION WITHOUT COMPLICATION, UNSPECIFIED WHETHER LONG TERM INSULIN USE (H): ICD-10-CM

## 2021-09-14 RX ORDER — ASPIRIN 81 MG/1
TABLET, CHEWABLE ORAL
Qty: 100 TABLET | Refills: 0 | OUTPATIENT
Start: 2021-09-14

## 2021-09-14 RX ORDER — METFORMIN HCL 500 MG
1000 TABLET, EXTENDED RELEASE 24 HR ORAL 2 TIMES DAILY WITH MEALS
Qty: 60 TABLET | Refills: 0 | OUTPATIENT
Start: 2021-09-14

## 2021-09-14 RX ORDER — INSULIN GLARGINE 100 [IU]/ML
INJECTION, SOLUTION SUBCUTANEOUS
Qty: 15 ML | Refills: 0 | OUTPATIENT
Start: 2021-09-14

## 2021-09-14 NOTE — TELEPHONE ENCOUNTER
"Requested Prescriptions   Pending Prescriptions Disp Refills     metFORMIN (GLUCOPHAGE-XR) 500 MG 24 hr tablet 60 tablet 0     Sig: Take 2 tablets (1,000 mg) by mouth 2 times daily (with meals)     Last office visit: 12/28/2020   Future Office Visit:          Biguanide Agents Failed - 9/10/2021 10:41 AM        Failed - Patient has documented A1c within the specified period of time.     If HgbA1C is 8 or greater, it needs to be on file within the past 3 months.  If less than 8, must be on file within the past 6 months.     Recent Labs   Lab Test 12/28/20  0758   A1C 6.1*             Failed - Recent (6 mo) or future (30 days) visit within the authorizing provider's specialty     Patient had office visit in the last 6 months or has a visit in the next 30 days with authorizing provider or within the authorizing provider's specialty.  See \"Patient Info\" tab in inbasket, or \"Choose Columns\" in Meds & Orders section of the refill encounter.            Passed - Patient is age 10 or older        Passed - Patient's CR is NOT>1.4 OR Patient's EGFR is NOT<45 within past 12 mos.     Recent Labs   Lab Test 12/28/20  0758   GFRESTIMATED >90   GFRESTBLACK >90       Recent Labs   Lab Test 12/28/20  0758   CR 0.90             Passed - Patient does NOT have a diagnosis of CHF.        Passed - Medication is active on med list           aspirin (ASA) 81 MG chewable tablet 100 tablet 0       Analgesics (Non-Narcotic Tylenol and ASA Only) Passed - 9/10/2021 10:41 AM        Passed - Recent (12 mo) or future (30 days) visit within the authorizing provider's specialty     Patient has had an office visit with the authorizing provider or a provider within the authorizing providers department within the previous 12 mos or has a future within next 30 days. See \"Patient Info\" tab in inbasket, or \"Choose Columns\" in Meds & Orders section of the refill encounter.              Passed - Patient is age 20 years or older     If ASA is flagged for ages " "under 20 years old. Forward to provider for confirmation Ryes Syndrome is not a concern.              Passed - Medication is active on med list           insulin glargine (BASAGLAR KWIKPEN) 100 UNIT/ML pen 15 mL 0       Long Acting Insulin Protocol Failed - 9/10/2021 10:41 AM        Failed - HgbA1C in past 3 or 6 months     If HgbA1C is 8 or greater, it needs to be on file within the past 3 months.  If less than 8, must be on file within the past 6 months.     Recent Labs   Lab Test 12/28/20  0758   A1C 6.1*             Failed - Recent (6 mo) or future (30 days) visit within the authorizing provider's specialty     Patient had office visit in the last 6 months or has a visit in the next 30 days with authorizing provider or within the authorizing provider's specialty.  See \"Patient Info\" tab in inbasket, or \"Choose Columns\" in Meds & Orders section of the refill encounter.            Passed - Serum creatinine on file in past 12 months     Recent Labs   Lab Test 12/28/20  0758 02/18/20  0816   CR 0.90  --    CREAT  --  0.9       Ok to refill medication if creatinine is low          Passed - Medication is active on med list        Passed - Patient is age 18 or older         Denied  Refill duplicate from 9/8/2021  Milena Chu RN      "

## 2021-10-04 ENCOUNTER — OFFICE VISIT (OUTPATIENT)
Dept: FAMILY MEDICINE | Facility: CLINIC | Age: 61
End: 2021-10-04
Payer: COMMERCIAL

## 2021-10-04 VITALS
WEIGHT: 220.3 LBS | HEART RATE: 92 BPM | TEMPERATURE: 97.9 F | BODY MASS INDEX: 31.97 KG/M2 | RESPIRATION RATE: 18 BRPM | DIASTOLIC BLOOD PRESSURE: 68 MMHG | OXYGEN SATURATION: 98 % | SYSTOLIC BLOOD PRESSURE: 134 MMHG

## 2021-10-04 DIAGNOSIS — E78.2 MIXED HYPERLIPIDEMIA: ICD-10-CM

## 2021-10-04 DIAGNOSIS — Z86.19 HISTORY OF SHINGLES: ICD-10-CM

## 2021-10-04 DIAGNOSIS — E66.811 CLASS 1 OBESITY DUE TO EXCESS CALORIES WITH SERIOUS COMORBIDITY AND BODY MASS INDEX (BMI) OF 31.0 TO 31.9 IN ADULT: ICD-10-CM

## 2021-10-04 DIAGNOSIS — E08.9 DIABETES MELLITUS DUE TO UNDERLYING CONDITION WITHOUT COMPLICATION, UNSPECIFIED WHETHER LONG TERM INSULIN USE (H): Primary | ICD-10-CM

## 2021-10-04 DIAGNOSIS — Z12.5 SCREENING FOR PROSTATE CANCER: ICD-10-CM

## 2021-10-04 DIAGNOSIS — E66.09 CLASS 1 OBESITY DUE TO EXCESS CALORIES WITH SERIOUS COMORBIDITY AND BODY MASS INDEX (BMI) OF 31.0 TO 31.9 IN ADULT: ICD-10-CM

## 2021-10-04 DIAGNOSIS — Z29.9 PREVENTIVE MEASURE: ICD-10-CM

## 2021-10-04 DIAGNOSIS — E13.65 OTHER SPECIFIED DIABETES MELLITUS WITH HYPERGLYCEMIA, UNSPECIFIED WHETHER LONG TERM INSULIN USE (H): ICD-10-CM

## 2021-10-04 DIAGNOSIS — I10 ESSENTIAL HYPERTENSION: ICD-10-CM

## 2021-10-04 LAB
ALBUMIN SERPL-MCNC: 3.6 G/DL (ref 3.4–5)
ALP SERPL-CCNC: 113 U/L (ref 40–150)
ALT SERPL W P-5'-P-CCNC: 36 U/L (ref 0–70)
ANION GAP SERPL CALCULATED.3IONS-SCNC: 3 MMOL/L (ref 3–14)
AST SERPL W P-5'-P-CCNC: 17 U/L (ref 0–45)
BILIRUB SERPL-MCNC: 0.8 MG/DL (ref 0.2–1.3)
BUN SERPL-MCNC: 12 MG/DL (ref 7–30)
CALCIUM SERPL-MCNC: 8.4 MG/DL (ref 8.5–10.1)
CHLORIDE BLD-SCNC: 111 MMOL/L (ref 94–109)
CO2 SERPL-SCNC: 27 MMOL/L (ref 20–32)
CREAT SERPL-MCNC: 0.83 MG/DL (ref 0.66–1.25)
CREAT UR-MCNC: 212 MG/DL
GFR SERPL CREATININE-BSD FRML MDRD: >90 ML/MIN/1.73M2
GLUCOSE BLD-MCNC: 151 MG/DL (ref 70–99)
HBA1C MFR BLD: 6.5 % (ref 0–5.6)
MICROALBUMIN UR-MCNC: 14 MG/L
MICROALBUMIN/CREAT UR: 6.6 MG/G CR (ref 0–17)
POTASSIUM BLD-SCNC: 4.4 MMOL/L (ref 3.4–5.3)
PROT SERPL-MCNC: 6.7 G/DL (ref 6.8–8.8)
PSA SERPL-MCNC: 0.63 UG/L (ref 0–4)
SODIUM SERPL-SCNC: 141 MMOL/L (ref 133–144)

## 2021-10-04 PROCEDURE — 82043 UR ALBUMIN QUANTITATIVE: CPT | Performed by: STUDENT IN AN ORGANIZED HEALTH CARE EDUCATION/TRAINING PROGRAM

## 2021-10-04 PROCEDURE — 36415 COLL VENOUS BLD VENIPUNCTURE: CPT | Performed by: STUDENT IN AN ORGANIZED HEALTH CARE EDUCATION/TRAINING PROGRAM

## 2021-10-04 PROCEDURE — 80053 COMPREHEN METABOLIC PANEL: CPT | Performed by: STUDENT IN AN ORGANIZED HEALTH CARE EDUCATION/TRAINING PROGRAM

## 2021-10-04 PROCEDURE — 83036 HEMOGLOBIN GLYCOSYLATED A1C: CPT | Performed by: STUDENT IN AN ORGANIZED HEALTH CARE EDUCATION/TRAINING PROGRAM

## 2021-10-04 PROCEDURE — G0103 PSA SCREENING: HCPCS | Performed by: STUDENT IN AN ORGANIZED HEALTH CARE EDUCATION/TRAINING PROGRAM

## 2021-10-04 PROCEDURE — 99214 OFFICE O/P EST MOD 30 MIN: CPT | Performed by: STUDENT IN AN ORGANIZED HEALTH CARE EDUCATION/TRAINING PROGRAM

## 2021-10-04 RX ORDER — INSULIN GLARGINE 100 [IU]/ML
INJECTION, SOLUTION SUBCUTANEOUS
Qty: 15 ML | Refills: 2 | Status: SHIPPED | OUTPATIENT
Start: 2021-10-04 | End: 2022-08-17

## 2021-10-04 RX ORDER — GLUCOSAMINE HCL/CHONDROITIN SU 500-400 MG
CAPSULE ORAL
Qty: 100 EACH | Refills: 3 | Status: SHIPPED | OUTPATIENT
Start: 2021-10-04 | End: 2024-07-01

## 2021-10-04 RX ORDER — LISINOPRIL 40 MG/1
TABLET ORAL
Qty: 90 TABLET | Refills: 1 | Status: SHIPPED | OUTPATIENT
Start: 2021-10-04 | End: 2022-06-15

## 2021-10-04 RX ORDER — METFORMIN HCL 500 MG
1000 TABLET, EXTENDED RELEASE 24 HR ORAL 2 TIMES DAILY WITH MEALS
Qty: 360 TABLET | Refills: 1 | Status: SHIPPED | OUTPATIENT
Start: 2021-10-04 | End: 2022-04-15

## 2021-10-04 RX ORDER — LANCETS
EACH MISCELLANEOUS
Qty: 100 EACH | Refills: 11 | Status: SHIPPED | OUTPATIENT
Start: 2021-10-04

## 2021-10-04 RX ORDER — SIMVASTATIN 20 MG
20 TABLET ORAL AT BEDTIME
Qty: 90 TABLET | Refills: 1 | Status: SHIPPED | OUTPATIENT
Start: 2021-10-04 | End: 2022-06-15

## 2021-10-04 RX ORDER — ASPIRIN 81 MG/1
TABLET, CHEWABLE ORAL
Qty: 100 TABLET | Refills: 3 | Status: SHIPPED | OUTPATIENT
Start: 2021-10-04 | End: 2022-11-17

## 2021-10-04 ASSESSMENT — PAIN SCALES - GENERAL: PAINLEVEL: NO PAIN (0)

## 2021-10-04 NOTE — PROGRESS NOTES
Assessment & Plan     Diabetes mellitus due to underlying condition without complication, unspecified whether long term insulin use (H)  Other specified diabetes mellitus with hyperglycemia, unspecified whether long term insulin use (H)  Recheck A1c today.  His glucometer was damaged by his puppy so I ordered a new one.  Prior to the glucometer getting damaged his blood sugars were running 120 or less without going below 90.  Plan to continue current medications and follow-up in 6-month with new PCP  - Albumin Random Urine Quantitative with Creat Ratio; Future  - Hemoglobin A1c; Future  - insulin glargine (BASAGLAR KWIKPEN) 100 UNIT/ML pen; Inject 16 Units under the skin (Subcutaneously) at bedtime.  - insulin pen needle (31G X 6 MM) 31G X 6 MM miscellaneous; Use 1 pen needles daily or as directed.  - metFORMIN (GLUCOPHAGE-XR) 500 MG 24 hr tablet; Take 2 tablets (1,000 mg) by mouth 2 times daily (with meals)  - blood glucose monitoring (NO BRAND SPECIFIED) meter device kit; Use to test blood sugar 1 times daily or as directed. Preferred blood glucose meter OR supplies to accompany: Blood Glucose Monitor Brands: per insurance.  - blood glucose (NO BRAND SPECIFIED) test strip; Use to test blood sugar 1 times daily or as directed. To accompany: Blood Glucose Monitor Brands: per insurance.  - blood glucose calibration (NO BRAND SPECIFIED) solution; To accompany: Blood Glucose Monitor Brands: per insurance.  - thin (NO BRAND SPECIFIED) lancets; Use with lanceting device. To accompany: Blood Glucose Monitor Brands: per insurance.  - alcohol swab prep pads; Use to swab area of injection/rosalba as directed.    Mixed hyperlipidemia  Not fasting today.  Will get fasting blood work with next diabetes visit.  - simvastatin (ZOCOR) 20 MG tablet; Take 1 tablet (20 mg) by mouth At Bedtime    Essential hypertension  Stable. Continue current medication(s) and dose(s).   - Comprehensive metabolic panel (BMP + Alb, Alk Phos, ALT,  "AST, Total. Bili, TP); Future  - lisinopril (ZESTRIL) 40 MG tablet; Take 1 tablet (40 mg) by mouth daily    Class 1 obesity due to excess calories with serious comorbidity and body mass index (BMI) of 31.0 to 31.9 in adult  Work on eating healthy and exercising to lose weight.    Preventive measure  - aspirin (ASA) 81 MG chewable tablet; CHEW AND SWALLOW ONE TABLET BY MOUTH ONE TIME DAILY    Screening for prostate cancer  - PSA, screen; Future    History of shingles - right hip  Declined vaccine due to history of shingles.           BMI:   Estimated body mass index is 31.97 kg/m  as calculated from the following:    Height as of 4/27/21: 1.768 m (5' 9.61\").    Weight as of this encounter: 99.9 kg (220 lb 4.8 oz).   Weight management plan: Discussed healthy diet and exercise guidelines        No follow-ups on file.    DINH Somers CNP  [facility] is a 60 year old who presents for the following health issues     HPI     Diabetes Follow-up    How often are you checking your blood sugar? One time daily  What time of day are you checking your blood sugars (select all that apply)?  At bedtime  Have you had any blood sugars above 200?  No  Have you had any blood sugars below 70?  No    What symptoms do you notice when your blood sugar is low?  None    What concerns do you have today about your diabetes? None     Do you have any of these symptoms? (Select all that apply)  No numbness or tingling in feet.  No redness, sores or blisters on feet.  No complaints of excessive thirst.  No reports of blurry vision.  No significant changes to weight.        Hyperlipidemia Follow-Up      Are you regularly taking any medication or supplement to lower your cholesterol?   Yes- .    Are you having muscle aches or other side effects that you think could be caused by your cholesterol lowering medication?  No    Hypertension Follow-up      Do you check your blood pressure regularly " outside of the clinic? No     Are you following a low salt diet? No    Are your blood pressures ever more than 140 on the top number (systolic) OR more   than 90 on the bottom number (diastolic), for example 140/90? No    BP Readings from Last 2 Encounters:   10/04/21 134/68   04/27/21 (!) 140/80     Hemoglobin A1C (%)   Date Value   12/28/2020 6.1 (H)   07/13/2020 6.1 (H)     LDL Cholesterol Calculated (mg/dL)   Date Value   12/28/2020 92   12/17/2019 75         How many servings of fruits and vegetables do you eat daily?  2-3    On average, how many sweetened beverages do you drink each day (Examples: soda, juice, sweet tea, etc.  Do NOT count diet or artificially sweetened beverages)?   0    How many days per week do you exercise enough to make your heart beat faster? 3 or less    How many minutes a day do you exercise enough to make your heart beat faster? 9 or less    How many days per week do you miss taking your medication? 0        Review of Systems   Constitutional, HEENT, cardiovascular, pulmonary, GI, , musculoskeletal, neuro, skin, endocrine and psych systems are negative, except as otherwise noted.      Objective    /68 (BP Location: Left arm, Patient Position: Sitting)   Pulse 92   Temp 97.9  F (36.6  C) (Temporal)   Resp 18   Wt 99.9 kg (220 lb 4.8 oz)   SpO2 98%   BMI 31.97 kg/m    Body mass index is 31.97 kg/m .  Physical Exam   GENERAL: healthy, alert and no distress  RESP: lungs clear to auscultation - no rales, rhonchi or wheezes  CV: regular rate and rhythm, normal S1 S2, no S3 or S4, no murmur, click or rub, no peripheral edema and peripheral pulses strong  MS: bunion left lateral foot  SKIN: no suspicious lesions or rashes  NEURO: Normal strength and tone, mentation intact and speech normal  PSYCH: mentation appears normal, affect normal/bright  Diabetic foot exam: normal DP and PT pulses, no trophic changes or ulcerative lesions and normal sensory exam    No results found for  this or any previous visit (from the past 24 hour(s)).

## 2021-10-25 ENCOUNTER — OFFICE VISIT (OUTPATIENT)
Dept: FAMILY MEDICINE | Facility: CLINIC | Age: 61
End: 2021-10-25
Payer: COMMERCIAL

## 2021-10-25 VITALS
SYSTOLIC BLOOD PRESSURE: 136 MMHG | HEIGHT: 70 IN | DIASTOLIC BLOOD PRESSURE: 80 MMHG | BODY MASS INDEX: 32.35 KG/M2 | RESPIRATION RATE: 16 BRPM | OXYGEN SATURATION: 98 % | HEART RATE: 92 BPM | WEIGHT: 226 LBS | TEMPERATURE: 97.3 F

## 2021-10-25 DIAGNOSIS — E08.9 DIABETES MELLITUS DUE TO UNDERLYING CONDITION WITHOUT COMPLICATION, UNSPECIFIED WHETHER LONG TERM INSULIN USE (H): ICD-10-CM

## 2021-10-25 DIAGNOSIS — R10.9 RIGHT FLANK PAIN: Primary | ICD-10-CM

## 2021-10-25 DIAGNOSIS — Z23 NEED FOR PROPHYLACTIC VACCINATION AND INOCULATION AGAINST INFLUENZA: ICD-10-CM

## 2021-10-25 DIAGNOSIS — E66.09 CLASS 1 OBESITY DUE TO EXCESS CALORIES WITH SERIOUS COMORBIDITY AND BODY MASS INDEX (BMI) OF 31.0 TO 31.9 IN ADULT: ICD-10-CM

## 2021-10-25 DIAGNOSIS — E66.811 CLASS 1 OBESITY DUE TO EXCESS CALORIES WITH SERIOUS COMORBIDITY AND BODY MASS INDEX (BMI) OF 31.0 TO 31.9 IN ADULT: ICD-10-CM

## 2021-10-25 DIAGNOSIS — Z11.4 SCREENING FOR HIV (HUMAN IMMUNODEFICIENCY VIRUS): ICD-10-CM

## 2021-10-25 LAB
ALBUMIN SERPL-MCNC: 3.7 G/DL (ref 3.4–5)
ALBUMIN UR-MCNC: NEGATIVE MG/DL
ALP SERPL-CCNC: 108 U/L (ref 40–150)
ALT SERPL W P-5'-P-CCNC: 37 U/L (ref 0–70)
AMYLASE SERPL-CCNC: 37 U/L (ref 30–110)
ANION GAP SERPL CALCULATED.3IONS-SCNC: 4 MMOL/L (ref 3–14)
APPEARANCE UR: CLEAR
AST SERPL W P-5'-P-CCNC: 16 U/L (ref 0–45)
BASOPHILS # BLD AUTO: 0 10E3/UL (ref 0–0.2)
BASOPHILS NFR BLD AUTO: 1 %
BILIRUB SERPL-MCNC: 0.5 MG/DL (ref 0.2–1.3)
BILIRUB UR QL STRIP: NEGATIVE
BUN SERPL-MCNC: 13 MG/DL (ref 7–30)
CALCIUM SERPL-MCNC: 8.9 MG/DL (ref 8.5–10.1)
CHLORIDE BLD-SCNC: 109 MMOL/L (ref 94–109)
CO2 SERPL-SCNC: 27 MMOL/L (ref 20–32)
COLOR UR AUTO: YELLOW
CREAT SERPL-MCNC: 0.86 MG/DL (ref 0.66–1.25)
EOSINOPHIL # BLD AUTO: 0.3 10E3/UL (ref 0–0.7)
EOSINOPHIL NFR BLD AUTO: 4 %
ERYTHROCYTE [DISTWIDTH] IN BLOOD BY AUTOMATED COUNT: 13.8 % (ref 10–15)
GFR SERPL CREATININE-BSD FRML MDRD: >90 ML/MIN/1.73M2
GLUCOSE BLD-MCNC: 114 MG/DL (ref 70–99)
GLUCOSE UR STRIP-MCNC: NEGATIVE MG/DL
HCT VFR BLD AUTO: 44.5 % (ref 40–53)
HGB BLD-MCNC: 15 G/DL (ref 13.3–17.7)
HGB UR QL STRIP: NEGATIVE
HIV 1+2 AB+HIV1 P24 AG SERPL QL IA: NONREACTIVE
KETONES UR STRIP-MCNC: NEGATIVE MG/DL
LEUKOCYTE ESTERASE UR QL STRIP: NEGATIVE
LIPASE SERPL-CCNC: 189 U/L (ref 73–393)
LYMPHOCYTES # BLD AUTO: 1.5 10E3/UL (ref 0.8–5.3)
LYMPHOCYTES NFR BLD AUTO: 21 %
MCH RBC QN AUTO: 30.3 PG (ref 26.5–33)
MCHC RBC AUTO-ENTMCNC: 33.7 G/DL (ref 31.5–36.5)
MCV RBC AUTO: 90 FL (ref 78–100)
MONOCYTES # BLD AUTO: 0.6 10E3/UL (ref 0–1.3)
MONOCYTES NFR BLD AUTO: 8 %
NEUTROPHILS # BLD AUTO: 4.8 10E3/UL (ref 1.6–8.3)
NEUTROPHILS NFR BLD AUTO: 67 %
NITRATE UR QL: NEGATIVE
PH UR STRIP: 5 [PH] (ref 5–7)
PLATELET # BLD AUTO: 166 10E3/UL (ref 150–450)
POTASSIUM BLD-SCNC: 3.9 MMOL/L (ref 3.4–5.3)
PROT SERPL-MCNC: 6.6 G/DL (ref 6.8–8.8)
RBC # BLD AUTO: 4.95 10E6/UL (ref 4.4–5.9)
SODIUM SERPL-SCNC: 140 MMOL/L (ref 133–144)
SP GR UR STRIP: >=1.03 (ref 1–1.03)
UROBILINOGEN UR STRIP-ACNC: 0.2 E.U./DL
WBC # BLD AUTO: 7.2 10E3/UL (ref 4–11)

## 2021-10-25 PROCEDURE — 99214 OFFICE O/P EST MOD 30 MIN: CPT | Mod: 25 | Performed by: NURSE PRACTITIONER

## 2021-10-25 PROCEDURE — 82150 ASSAY OF AMYLASE: CPT | Performed by: NURSE PRACTITIONER

## 2021-10-25 PROCEDURE — 36415 COLL VENOUS BLD VENIPUNCTURE: CPT | Performed by: NURSE PRACTITIONER

## 2021-10-25 PROCEDURE — 87389 HIV-1 AG W/HIV-1&-2 AB AG IA: CPT | Performed by: NURSE PRACTITIONER

## 2021-10-25 PROCEDURE — 85025 COMPLETE CBC W/AUTO DIFF WBC: CPT | Performed by: NURSE PRACTITIONER

## 2021-10-25 PROCEDURE — 83690 ASSAY OF LIPASE: CPT | Performed by: NURSE PRACTITIONER

## 2021-10-25 PROCEDURE — 81003 URINALYSIS AUTO W/O SCOPE: CPT | Performed by: NURSE PRACTITIONER

## 2021-10-25 PROCEDURE — 90471 IMMUNIZATION ADMIN: CPT | Performed by: NURSE PRACTITIONER

## 2021-10-25 PROCEDURE — 80053 COMPREHEN METABOLIC PANEL: CPT | Performed by: NURSE PRACTITIONER

## 2021-10-25 PROCEDURE — 90682 RIV4 VACC RECOMBINANT DNA IM: CPT | Performed by: NURSE PRACTITIONER

## 2021-10-25 ASSESSMENT — MIFFLIN-ST. JEOR: SCORE: 1825.03

## 2021-10-25 ASSESSMENT — PAIN SCALES - GENERAL: PAINLEVEL: MILD PAIN (3)

## 2021-10-25 NOTE — PROGRESS NOTES
Assessment & Plan     Right flank pain  Unclear etiology  - labs reviewed, likely MSK in nature. Discussed worsening symptoms and low threshold for imaging if not improving with pancreatic issues. Fluids, OTC pain relievers- tylenol. Active monitoring.   CO-morbid concerns DM and Hx of pancreatitis.       - Comprehensive metabolic panel (BMP + Alb, Alk Phos, ALT, AST, Total. Bili, TP); Future  - CBC with platelets and differential; Future  - UA Macro with Reflex to Micro and Culture - lab collect; Future  - Amylase; Future  - Lipase; Future  - Comprehensive metabolic panel (BMP + Alb, Alk Phos, ALT, AST, Total. Bili, TP)  - CBC with platelets and differential  - UA Macro with Reflex to Micro and Culture - lab collect  - Amylase  - Lipase    Class 1 obesity due to excess calories with serious comorbidity and body mass index (BMI) of 31.0 to 31.9 in adult  - discussed healthy habits, BG testing more routinely.     Diabetes mellitus due to underlying condition without complication, unspecified whether long term insulin use (H)  - stable- as above.     Screening for HIV (human immunodeficiency virus)  -adding to blood work.   - HIV Antigen Antibody Combo; Future  - HIV Antigen Antibody Combo    Need for prophylactic vaccination and inoculation against influenza  Updating.   - INFLUENZA QUAD, RECOMBINANT, P-FREE (RIV4) (FLUBLOK)        Return in about 3 months (around 1/25/2022) for Physical Exam.    DINH Rangel [facility] KUHN    Yash     Zackery Rogers is a 61 year old male who presents to clinic today for the following health issues accompanied by his :    HPI   Answers for HPI/ROS submitted by the patient on 10/25/2021  Your back pain is: new-about 1.5 weeks  What do you think is the original cause of your back pain?: not sure  When did you first notice your back pain? : 1-4 weeks ago  How would you describe your back pain? : dull ache  How often do you feel your back pain? :  "daily  Where is your back pain located? : right middle of back-right side only  Where does your back pain spread? : nowhere  Since you noticed your back pain, how has it changed? : unchanged  Does your back pain interfere with your job?: No  On a scale of 1-10 (10 being the worst), how strong is your back pain?: 3  What makes your back pain worse? : lying down  Acetaminophen: not tried  Activity or Exercise: not tried  Chiropractor: not tried  Cold: not tried  Heat: not tried  Massage: not tried  Muscle relaxants : not tried  NSAIDS (Ibuprofen, Naproxen) : not tried  Opioids: not tried  Physical Therapy: not tried  Rest: not tried  Stretching : not tried  Surgery: not tried  TENS Unit: not tried  Topical pain relievers : not tried  Do you see any other healthcare providers for your back pain? : None  Symptoms mostly present in AM, get better when moving. Occ. Motions will exacerbate symptoms, but pain does not move.   No N/V.   No severe pain .    DM- not checking BG levels daily- has been in good range.   HX of pancreatitis and mass- benign etiology.             Review of Systems   Constitutional, HEENT, cardiovascular, pulmonary, gi and gu systems are negative, except as otherwise noted.      Objective    BP (!) 148/82   Pulse 92   Temp 97.3  F (36.3  C) (Temporal)   Resp 16   Ht 1.768 m (5' 9.6\")   Wt 102.5 kg (226 lb)   SpO2 98%   BMI 32.80 kg/m    Body mass index is 32.8 kg/m .  Physical Exam   GENERAL: healthy, alert and no distress  EYES: Eyes grossly normal to inspection  HENT: nose and mouth without ulcers or lesions  NECK: no adenopathy, no asymmetry, masses, or scars and thyroid normal to palpation  RESP: lungs clear to auscultation - no rales, rhonchi or wheezes  CV: regular rate and rhythm, normal S1 S2, no S3 or S4, no murmur, click or rub, no peripheral edema and peripheral pulses strong  ABDOMEN: soft, nontender, no hepatosplenomegaly, no masses and bowel sounds normal, no CVAT  MS: no gross " musculoskeletal defects noted, no edema, no vertebral tenderness, pain in right lumbar paraspinal muscle region. Normal ROM  NEURO: Normal strength and tone, mentation intact and speech normal

## 2022-02-20 ENCOUNTER — HEALTH MAINTENANCE LETTER (OUTPATIENT)
Age: 62
End: 2022-02-20

## 2022-04-13 DIAGNOSIS — E08.9 DIABETES MELLITUS DUE TO UNDERLYING CONDITION WITHOUT COMPLICATION, UNSPECIFIED WHETHER LONG TERM INSULIN USE (H): ICD-10-CM

## 2022-04-14 NOTE — TELEPHONE ENCOUNTER
Pending Prescriptions:                       Disp   Refills    metFORMIN (GLUCOPHAGE-XR) 500 MG 24 hr tab*360 ta*0        Sig: Take 2 tablets (1,000 mg) by mouth 2 times daily           (with meals)    Routing refill request to provider for review/approval because:  Labs not current:    Lab Results   Component Value Date    A1C 6.5 10/04/2021    A1C 6.1 12/28/2020    A1C 6.1 07/13/2020    A1C 5.9 12/03/2019    A1C 5.8 08/21/2019    A1C 12.7 03/07/2019

## 2022-04-15 RX ORDER — METFORMIN HCL 500 MG
1000 TABLET, EXTENDED RELEASE 24 HR ORAL 2 TIMES DAILY WITH MEALS
Qty: 360 TABLET | Refills: 0 | Status: SHIPPED | OUTPATIENT
Start: 2022-04-15 | End: 2022-06-15

## 2022-04-17 ENCOUNTER — HEALTH MAINTENANCE LETTER (OUTPATIENT)
Age: 62
End: 2022-04-17

## 2022-06-13 DIAGNOSIS — E78.2 MIXED HYPERLIPIDEMIA: ICD-10-CM

## 2022-06-13 DIAGNOSIS — E08.9 DIABETES MELLITUS DUE TO UNDERLYING CONDITION WITHOUT COMPLICATION, UNSPECIFIED WHETHER LONG TERM INSULIN USE (H): ICD-10-CM

## 2022-06-13 DIAGNOSIS — I10 ESSENTIAL HYPERTENSION: ICD-10-CM

## 2022-06-13 NOTE — LETTER
June 17, 2022      Zackery Rogers  [address] [facility] [address]              Cornelius Chen,     We just wanted to let you know that we sent in a refill for your Simvastatin and lisinopril but you are due for a physical visit before your next refill is due.      Please give us a call at [phone] or use [facility] to schedule.      Have a great day.     Your [facility] Team

## 2022-06-15 RX ORDER — METFORMIN HCL 500 MG
1000 TABLET, EXTENDED RELEASE 24 HR ORAL 2 TIMES DAILY WITH MEALS
Qty: 360 TABLET | Refills: 0 | Status: SHIPPED | OUTPATIENT
Start: 2022-06-15 | End: 2022-08-17

## 2022-06-15 RX ORDER — SIMVASTATIN 20 MG
20 TABLET ORAL AT BEDTIME
Qty: 90 TABLET | Refills: 0 | Status: SHIPPED | OUTPATIENT
Start: 2022-06-15 | End: 2022-08-17

## 2022-06-15 RX ORDER — LISINOPRIL 40 MG/1
TABLET ORAL
Qty: 90 TABLET | Refills: 0 | Status: SHIPPED | OUTPATIENT
Start: 2022-06-15 | End: 2022-08-17

## 2022-06-15 NOTE — TELEPHONE ENCOUNTER
Prescription approved per [facility] Refill Protocol.  Rina Hitchcock, BSN, RN, PHN  [facility] RN for [place] [facility] [place]  Mally 15, 2022

## 2022-06-15 NOTE — TELEPHONE ENCOUNTER
Pending Prescriptions:                       Disp   Refills    simvastatin (ZOCOR) 20 MG tablet [Pharmac*90 tab*0            Sig: Take 1 tablet (20 mg) by mouth At Bedtime.    lisinopril (ZESTRIL) 40 MG tablet [Pharma*90 tab*0            Sig: Take 1 tablet (40 mg) by mouth daily.    Medication is being filled for 1 time jaspreet refill only due to:  Patient is due for physical.     Please call and help schedule.  Thank you!        Rina Hitchcock, KEKEN, RN, PHN  [facility] RN for [place]  Mally 15, 2022

## 2022-08-17 ENCOUNTER — OFFICE VISIT (OUTPATIENT)
Dept: FAMILY MEDICINE | Facility: OTHER | Age: 62
End: 2022-08-17
Payer: COMMERCIAL

## 2022-08-17 VITALS
WEIGHT: 226.31 LBS | TEMPERATURE: 98.6 F | SYSTOLIC BLOOD PRESSURE: 132 MMHG | OXYGEN SATURATION: 96 % | BODY MASS INDEX: 32.4 KG/M2 | RESPIRATION RATE: 20 BRPM | HEART RATE: 89 BPM | DIASTOLIC BLOOD PRESSURE: 84 MMHG | HEIGHT: 70 IN

## 2022-08-17 DIAGNOSIS — F41.9 ANXIETY: ICD-10-CM

## 2022-08-17 DIAGNOSIS — I10 ESSENTIAL HYPERTENSION: ICD-10-CM

## 2022-08-17 DIAGNOSIS — E11.65 UNCONTROLLED TYPE 2 DIABETES MELLITUS WITH HYPERGLYCEMIA (H): ICD-10-CM

## 2022-08-17 DIAGNOSIS — E08.9 DIABETES MELLITUS DUE TO UNDERLYING CONDITION WITHOUT COMPLICATION, UNSPECIFIED WHETHER LONG TERM INSULIN USE (H): Primary | ICD-10-CM

## 2022-08-17 DIAGNOSIS — E78.2 MIXED HYPERLIPIDEMIA: ICD-10-CM

## 2022-08-17 DIAGNOSIS — Z23 HIGH PRIORITY FOR 2019-NCOV VACCINE: ICD-10-CM

## 2022-08-17 DIAGNOSIS — E13.65 OTHER SPECIFIED DIABETES MELLITUS WITH HYPERGLYCEMIA, UNSPECIFIED WHETHER LONG TERM INSULIN USE (H): ICD-10-CM

## 2022-08-17 LAB
ALBUMIN SERPL-MCNC: 3.7 G/DL (ref 3.4–5)
ALP SERPL-CCNC: 127 U/L (ref 40–150)
ALT SERPL W P-5'-P-CCNC: 35 U/L (ref 0–70)
ANION GAP SERPL CALCULATED.3IONS-SCNC: 3 MMOL/L (ref 3–14)
AST SERPL W P-5'-P-CCNC: 22 U/L (ref 0–45)
BILIRUB SERPL-MCNC: 0.8 MG/DL (ref 0.2–1.3)
BUN SERPL-MCNC: 10 MG/DL (ref 7–30)
CALCIUM SERPL-MCNC: 8.6 MG/DL (ref 8.5–10.1)
CHLORIDE BLD-SCNC: 107 MMOL/L (ref 94–109)
CHOLEST SERPL-MCNC: 120 MG/DL
CO2 SERPL-SCNC: 29 MMOL/L (ref 20–32)
CREAT SERPL-MCNC: 0.84 MG/DL (ref 0.66–1.25)
CREAT UR-MCNC: 197 MG/DL
FASTING STATUS PATIENT QL REPORTED: NO
GFR SERPL CREATININE-BSD FRML MDRD: >90 ML/MIN/1.73M2
GLUCOSE BLD-MCNC: 160 MG/DL (ref 70–99)
HBA1C MFR BLD: 8.7 % (ref 0–5.6)
HDLC SERPL-MCNC: 31 MG/DL
LDLC SERPL CALC-MCNC: 58 MG/DL
MICROALBUMIN UR-MCNC: 22 MG/L
MICROALBUMIN/CREAT UR: 11.17 MG/G CR (ref 0–17)
NONHDLC SERPL-MCNC: 89 MG/DL
POTASSIUM BLD-SCNC: 4.1 MMOL/L (ref 3.4–5.3)
PROT SERPL-MCNC: 6.6 G/DL (ref 6.8–8.8)
SODIUM SERPL-SCNC: 139 MMOL/L (ref 133–144)
TRIGL SERPL-MCNC: 157 MG/DL
TSH SERPL DL<=0.005 MIU/L-ACNC: 1.47 MU/L (ref 0.4–4)

## 2022-08-17 PROCEDURE — 99214 OFFICE O/P EST MOD 30 MIN: CPT | Mod: 25 | Performed by: FAMILY MEDICINE

## 2022-08-17 PROCEDURE — 90677 PCV20 VACCINE IM: CPT | Performed by: FAMILY MEDICINE

## 2022-08-17 PROCEDURE — 91306 COVID-19,PF,MODERNA (18+ YRS BOOSTER .25ML): CPT | Performed by: FAMILY MEDICINE

## 2022-08-17 PROCEDURE — 82043 UR ALBUMIN QUANTITATIVE: CPT | Performed by: FAMILY MEDICINE

## 2022-08-17 PROCEDURE — 83036 HEMOGLOBIN GLYCOSYLATED A1C: CPT | Performed by: FAMILY MEDICINE

## 2022-08-17 PROCEDURE — 80053 COMPREHEN METABOLIC PANEL: CPT | Performed by: FAMILY MEDICINE

## 2022-08-17 PROCEDURE — 36415 COLL VENOUS BLD VENIPUNCTURE: CPT | Performed by: FAMILY MEDICINE

## 2022-08-17 PROCEDURE — 84443 ASSAY THYROID STIM HORMONE: CPT | Performed by: FAMILY MEDICINE

## 2022-08-17 PROCEDURE — 0064A COVID-19,PF,MODERNA (18+ YRS BOOSTER .25ML): CPT | Performed by: FAMILY MEDICINE

## 2022-08-17 PROCEDURE — 80061 LIPID PANEL: CPT | Performed by: FAMILY MEDICINE

## 2022-08-17 PROCEDURE — 90471 IMMUNIZATION ADMIN: CPT | Performed by: FAMILY MEDICINE

## 2022-08-17 RX ORDER — INSULIN GLARGINE 100 [IU]/ML
INJECTION, SOLUTION SUBCUTANEOUS
Qty: 15 ML | Refills: 2 | Status: SHIPPED | OUTPATIENT
Start: 2022-08-17 | End: 2022-11-02

## 2022-08-17 RX ORDER — METFORMIN HCL 500 MG
1000 TABLET, EXTENDED RELEASE 24 HR ORAL 2 TIMES DAILY WITH MEALS
Qty: 360 TABLET | Refills: 0 | Status: SHIPPED | OUTPATIENT
Start: 2022-08-17 | End: 2022-11-02

## 2022-08-17 RX ORDER — SIMVASTATIN 20 MG
20 TABLET ORAL AT BEDTIME
Qty: 90 TABLET | Refills: 0 | Status: SHIPPED | OUTPATIENT
Start: 2022-08-17 | End: 2022-11-02

## 2022-08-17 RX ORDER — LISINOPRIL 40 MG/1
40 TABLET ORAL DAILY
Qty: 90 TABLET | Refills: 0 | Status: SHIPPED | OUTPATIENT
Start: 2022-08-17 | End: 2022-11-02

## 2022-08-17 ASSESSMENT — ANXIETY QUESTIONNAIRES
7. FEELING AFRAID AS IF SOMETHING AWFUL MIGHT HAPPEN: NOT AT ALL
7. FEELING AFRAID AS IF SOMETHING AWFUL MIGHT HAPPEN: NOT AT ALL
1. FEELING NERVOUS, ANXIOUS, OR ON EDGE: NEARLY EVERY DAY
4. TROUBLE RELAXING: SEVERAL DAYS
2. NOT BEING ABLE TO STOP OR CONTROL WORRYING: SEVERAL DAYS
IF YOU CHECKED OFF ANY PROBLEMS ON THIS QUESTIONNAIRE, HOW DIFFICULT HAVE THESE PROBLEMS MADE IT FOR YOU TO DO YOUR WORK, TAKE CARE OF THINGS AT HOME, OR GET ALONG WITH OTHER PEOPLE: VERY DIFFICULT
GAD7 TOTAL SCORE: 7
3. WORRYING TOO MUCH ABOUT DIFFERENT THINGS: SEVERAL DAYS
6. BECOMING EASILY ANNOYED OR IRRITABLE: SEVERAL DAYS
5. BEING SO RESTLESS THAT IT IS HARD TO SIT STILL: NOT AT ALL
8. IF YOU CHECKED OFF ANY PROBLEMS, HOW DIFFICULT HAVE THESE MADE IT FOR YOU TO DO YOUR WORK, TAKE CARE OF THINGS AT HOME, OR GET ALONG WITH OTHER PEOPLE?: VERY DIFFICULT
GAD7 TOTAL SCORE: 7

## 2022-08-17 ASSESSMENT — ENCOUNTER SYMPTOMS: NERVOUS/ANXIOUS: 1

## 2022-08-17 NOTE — NURSING NOTE
Prior to immunization administration, verified patients identity using patient s name and date of birth. Please see Immunization Activity for additional information.     Screening Questionnaire for Adult Immunization    Are you sick today?   No   Do you have allergies to medications, food, a vaccine component or latex?   No   Have you ever had a serious reaction after receiving a vaccination?   No   Do you have a long-term health problem with heart, lung, kidney, or metabolic disease (e.g., diabetes), asthma, a blood disorder, no spleen, complement component deficiency, a cochlear implant, or a spinal fluid leak?  Are you on long-term aspirin therapy?   No   Do you have cancer, leukemia, HIV/AIDS, or any other immune system problem?   No   Do you have a parent, brother, or sister with an immune system problem?   No   In the past 3 months, have you taken medications that affect  your immune system, such as prednisone, other steroids, or anticancer drugs; drugs for the treatment of rheumatoid arthritis, Crohn s disease, or psoriasis; or have you had radiation treatments?   No   Have you had a seizure, or a brain or other nervous system problem?   No   During the past year, have you received a transfusion of blood or blood    products, or been given immune (gamma) globulin or antiviral drug?   No   For women: Are you pregnant or is there a chance you could become       pregnant during the next month?   No   Have you received any vaccinations in the past 4 weeks?   No     Immunization questionnaire answers were all negative.        Per orders of Dr. Bolivar, injection of Prevnar 20 and Moderna given by Pina Lunsford CMA. Patient instructed to remain in clinic for 15 minutes afterwards, and to report any adverse reaction to me immediately.       Screening performed by Pina Lunsford CMA on 8/17/2022 at 10:15 AM.

## 2022-08-17 NOTE — PROGRESS NOTES
Assessment & Plan       ICD-10-CM    1. Diabetes mellitus due to underlying condition without complication, unspecified whether long term insulin use (H)  E08.9 Albumin Random Urine Quantitative with Creat Ratio     COMPREHENSIVE METABOLIC PANEL     Lipid panel reflex to direct LDL Fasting     HEMOGLOBIN A1C     Albumin Random Urine Quantitative with Creat Ratio     COMPREHENSIVE METABOLIC PANEL     Lipid panel reflex to direct LDL Fasting     HEMOGLOBIN A1C     insulin glargine (BASAGLAR KWIKPEN) 100 UNIT/ML pen     metFORMIN (GLUCOPHAGE XR) 500 MG 24 hr tablet     AMB Adult Diabetes Educator Referral     TSH with free T4 reflex     TSH with free T4 reflex   2. Uncontrolled type 2 diabetes mellitus with hyperglycemia (H)  E11.65 Albumin Random Urine Quantitative with Creat Ratio     COMPREHENSIVE METABOLIC PANEL     Lipid panel reflex to direct LDL Fasting     HEMOGLOBIN A1C     Albumin Random Urine Quantitative with Creat Ratio     COMPREHENSIVE METABOLIC PANEL     Lipid panel reflex to direct LDL Fasting     HEMOGLOBIN A1C   3. Other specified diabetes mellitus with hyperglycemia, unspecified whether long term insulin use (H)  E13.65    4. Essential hypertension  I10 lisinopril (ZESTRIL) 40 MG tablet   5. Mixed hyperlipidemia  E78.2 simvastatin (ZOCOR) 20 MG tablet   6. Anxiety  F41.9 sertraline (ZOLOFT) 50 MG tablet   7. High priority for 2019-nCoV vaccine  Z23 COVID-19,PF,MODERNA (18+ Yrs BOOSTER .25mL)     Gustavo is had an increase in his A1c and had been only checking fasting morning blood sugars.  He had increased his insulin by 2 units as he consistently stayed above the 140 range and is currently taking 20 units nightly.  Though despite this he has increased his A1c to an 8.7 which suggest there are times the day where he is high above the still.  The most accurate way to find out what his blood sugars are doing and when would be to follow trends and we did ask him to follow his blood sugars through the  "day and follow-up with the diabetic educators to adjust insulin and/or other medications as needed.  He has been less active compared to previous and is interested in increasing this as well.  He does wonder if his increase in blood sugars have been due to his anxiety as well.  We did discuss this and planned for starting Zoloft as long as the thyroid is not the reason for his symptoms which was added onto labs today.  As the thyroid was normal we will anticipate Zoloft to be started and follow-up in 1 month to see how it is doing and offered a virtual visit for this if he is interested.  Otherwise diabetes visit follow-up is in 3 months    Review of the result(s) of each unique test - a1c, tsh, cmp  23 minutes spent on the date of the encounter doing chart review, history and exam, documentation and further activities per the note       BMI:   Estimated body mass index is 32.85 kg/m  as calculated from the following:    Height as of this encounter: 1.768 m (5' 9.6\").    Weight as of this encounter: 102.7 kg (226 lb 5 oz).   Weight management plan: Discussed healthy diet and exercise guidelines        Return in about 4 weeks (around 9/14/2022) for mood.    Kim Bolivar MD, MD  [facility] RYLIE Chen is a 61 year old, presenting for the following health issues:  Diabetes and Anxiety      Anxiety    History of Present Illness       Mental Health Follow-up:  Patient presents to follow-up on Anxiety.    Patient's anxiety since last visit has been:  Worse  The patient is having other symptoms associated with anxiety.  Any significant life events: No  Patient is feeling anxious or having panic attacks.  Patient has no concerns about alcohol or drug use.    He eats 0-1 servings of fruits and vegetables daily.He consumes 0 sweetened beverage(s) daily.He exercises with enough effort to increase his heart rate 9 or less minutes per day.  He exercises with enough effort to increase his heart rate " "3 or less days per week.   He is taking medications regularly.  Today's LINDA-7 Score: 7       Diabetes Follow-up    How often are you checking your blood sugar? A few times a month  What time of day are you checking your blood sugars (select all that apply)?  in the morning before breakfast  Have you had any blood sugars above 200?  No  Have you had any blood sugars below 70?  No    What symptoms do you notice when your blood sugar is low?  None and Not applicable    What concerns do you have today about your diabetes? None     Do you have any of these symptoms? (Select all that apply)  No numbness or tingling in feet.  No redness, sores or blisters on feet.  No complaints of excessive thirst.  No reports of blurry vision.  No significant changes to weight.    Have you had a diabetic eye exam in the last 12 months? No        BP Readings from Last 2 Encounters:   08/17/22 132/84   10/25/21 136/80     Hemoglobin A1C (%)   Date Value   08/17/2022 8.7 (H)   10/04/2021 6.5 (H)   12/28/2020 6.1 (H)   07/13/2020 6.1 (H)     LDL Cholesterol Calculated (mg/dL)   Date Value   12/28/2020 92   12/17/2019 75                   Review of Systems   Psychiatric/Behavioral: The patient is nervous/anxious.       Constitutional, HEENT, cardiovascular, pulmonary, GI, , musculoskeletal, neuro, skin, endocrine and psych systems are negative, except as otherwise noted.      Objective    /84 (BP Location: Right arm, Patient Position: Sitting, Cuff Size: Adult Regular)   Pulse 89   Temp 98.6  F (37  C) (Temporal)   Resp 20   Ht 1.768 m (5' 9.6\")   Wt 102.7 kg (226 lb 5 oz)   SpO2 96%   BMI 32.85 kg/m    Body mass index is 32.85 kg/m .  Physical Exam   GENERAL: healthy, alert and no distress  EYES: Eyes grossly normal to inspection, PERRL and conjunctivae and sclerae normal  HENT: ear canals and TM's normal, nose and mouth without ulcers or lesions  NECK: no adenopathy, no asymmetry, masses, or scars and thyroid normal to " palpation  RESP: lungs clear to auscultation - no rales, rhonchi or wheezes  CV: regular rate and rhythm, normal S1 S2, no S3 or S4, no murmur, click or rub, no peripheral edema and peripheral pulses strong  ABDOMEN: soft, nontender, no hepatosplenomegaly, no masses and bowel sounds normal  MS: no gross musculoskeletal defects noted, no edema  SKIN: no suspicious lesions or rashes  NEURO: Normal strength and tone, mentation intact and speech normal  PSYCH: mentation appears normal, affect normal/bright  Diabetic foot exam: normal DP and PT pulses, no trophic changes or ulcerative lesions, normal sensory exam and normal monofilament exam                    .  ..

## 2022-08-23 ENCOUNTER — TELEPHONE (OUTPATIENT)
Dept: NURSING | Facility: CLINIC | Age: 62
End: 2022-08-23

## 2022-08-23 NOTE — TELEPHONE ENCOUNTER
Telephone call  Patient calling  He saw Dr Bolivar on 8/17 he was prescribed  Sertraline  He has taken it for 5 days and he feels the tightness  In the chest  Is more severe and breathing fast and  Today when he was in a meeting he had shaking of his arms.  He is wondering if the Sertraline is making his anxiety worse.  Should he stop the Sertraline or should he continue to take the medication.  Routing to Provider.    Roberta Ambrose RN   [facility] Nurse Advisor  11:14 AM 8/23/2022    COVID 19 Nurse Triage Plan/Patient Instructions    Please be aware that novel coronavirus (COVID-19) may be circulating in the community. If you develop symptoms such as fever, cough, or SOB or if you have concerns about the presence of another infection including coronavirus (COVID-19), please contact your health care provider or visit [email or web address].     Disposition/Instructions    Home care recommended. Follow home care protocol based instructions.    Thank you for taking steps to prevent the spread of this virus.  o Limit your contact with others.  o Wear a simple mask to cover your cough.  o Wash your hands well and often.    Resources    [facility]: About COVID-19: [email or web address]    CDC: What to Do If You're Sick: www.cdc.gov/coronavirus/2019-ncov/about/steps-when-sick.html    CDC: Ending Home Isolation: www.cdc.gov/coronavirus/2019-ncov/hcp/disposition-in-home-patients.html     CDC: Caring for Someone: www.cdc.gov/coronavirus/2019-ncov/if-you-are-sick/care-for-someone.html     [facility]: Interim Guidance for Hospital Discharge to Home: www.health.[facility].[place]/diseases/coronavirus/hcp/hospdischarge.pdf    [facility] clinical trials (COVID-19 research studies): [email or web address]-trials     Below are the COVID-19 hotlines at the [place] Department of Health ([facility]). Interpreters are available.   o For health questions: Call 442-712-3394 or 1-357.485.3427 (7  a.m. to 7 p.m.)  o For questions about schools and childcare: Call 618-159-6450 or 1-184.113.2802 (7 a.m. to 7 p.m.)

## 2022-08-23 NOTE — TELEPHONE ENCOUNTER
That is longer than is typical for transition. Though make sure he saw the instructions as it looks like it was written both ways and we had planned a wean, likely the increased length of the symptoms would be expected if he was taking the full 50 mg from the start. I apologize for not correcting this on the prescription.   If he was this sensitive to the med and did wean up, we may need to the liquid for the transition to make smaller changes as it usually is just an adjustment period.  Kim Bolivar MD

## 2022-08-23 NOTE — TELEPHONE ENCOUNTER
RN called to discuss message below.   Patient stated that his symptoms have disappeared. He said that they lasted a few hours.   Patient took 1/2 tablet for one, then increased to 1 whole tablet.     JUAN Guillen, RN, PHN  [place] [facility]  August 23, 2022

## 2022-09-17 DIAGNOSIS — F41.9 ANXIETY: ICD-10-CM

## 2022-09-23 ENCOUNTER — OFFICE VISIT (OUTPATIENT)
Dept: FAMILY MEDICINE | Facility: OTHER | Age: 62
End: 2022-09-23
Payer: COMMERCIAL

## 2022-09-23 VITALS
RESPIRATION RATE: 16 BRPM | TEMPERATURE: 97.7 F | DIASTOLIC BLOOD PRESSURE: 66 MMHG | BODY MASS INDEX: 32.37 KG/M2 | HEART RATE: 81 BPM | OXYGEN SATURATION: 97 % | SYSTOLIC BLOOD PRESSURE: 136 MMHG | WEIGHT: 223 LBS

## 2022-09-23 DIAGNOSIS — F41.9 ANXIETY: Primary | ICD-10-CM

## 2022-09-23 PROBLEM — E66.09 CLASS 1 OBESITY DUE TO EXCESS CALORIES WITH SERIOUS COMORBIDITY AND BODY MASS INDEX (BMI) OF 31.0 TO 31.9 IN ADULT: Status: RESOLVED | Noted: 2018-09-04 | Resolved: 2022-09-23

## 2022-09-23 PROBLEM — E13.65: Status: RESOLVED | Noted: 2021-10-04 | Resolved: 2022-09-23

## 2022-09-23 PROBLEM — E66.811 CLASS 1 OBESITY DUE TO EXCESS CALORIES WITH SERIOUS COMORBIDITY AND BODY MASS INDEX (BMI) OF 31.0 TO 31.9 IN ADULT: Status: RESOLVED | Noted: 2018-09-04 | Resolved: 2022-09-23

## 2022-09-23 PROCEDURE — 99213 OFFICE O/P EST LOW 20 MIN: CPT | Performed by: FAMILY MEDICINE

## 2022-09-23 RX ORDER — SERTRALINE HYDROCHLORIDE 100 MG/1
TABLET, FILM COATED ORAL
Qty: 60 TABLET | Refills: 0 | Status: SHIPPED | OUTPATIENT
Start: 2022-09-23 | End: 2022-11-02

## 2022-09-23 RX ORDER — HYDROXYZINE PAMOATE 25 MG/1
25 CAPSULE ORAL 3 TIMES DAILY PRN
Qty: 20 CAPSULE | Refills: 0 | Status: SHIPPED | OUTPATIENT
Start: 2022-09-23 | End: 2023-08-08

## 2022-09-23 ASSESSMENT — ANXIETY QUESTIONNAIRES
5. BEING SO RESTLESS THAT IT IS HARD TO SIT STILL: MORE THAN HALF THE DAYS
7. FEELING AFRAID AS IF SOMETHING AWFUL MIGHT HAPPEN: NOT AT ALL
2. NOT BEING ABLE TO STOP OR CONTROL WORRYING: SEVERAL DAYS
1. FEELING NERVOUS, ANXIOUS, OR ON EDGE: NEARLY EVERY DAY
6. BECOMING EASILY ANNOYED OR IRRITABLE: SEVERAL DAYS
GAD7 TOTAL SCORE: 9
GAD7 TOTAL SCORE: 9
3. WORRYING TOO MUCH ABOUT DIFFERENT THINGS: MORE THAN HALF THE DAYS

## 2022-09-23 ASSESSMENT — PAIN SCALES - GENERAL: PAINLEVEL: MILD PAIN (2)

## 2022-09-23 ASSESSMENT — PATIENT HEALTH QUESTIONNAIRE - PHQ9
SUM OF ALL RESPONSES TO PHQ QUESTIONS 1-9: 12
5. POOR APPETITE OR OVEREATING: NOT AT ALL

## 2022-09-23 NOTE — PROGRESS NOTES
Assessment & Plan     Anxiety  At this time will increase to 100 mg daily, he has been out for a couple days so will have him take 50 mg for 4 days and then increase to the 100 mg.  Will also try hydroxyzine prn for anxiety attack.  Follow up in 4-6 weeks.    - sertraline (ZOLOFT) 100 MG tablet; Take 1/2 tab daily for 4 days then increase to 1 tablet daily  - hydrOXYzine (VISTARIL) 25 MG capsule; Take 1 capsule (25 mg) by mouth 3 times daily as needed for anxiety       Depression Screening Follow Up    PHQ 9/23/2022   PHQ-9 Total Score 12   Q9: Thoughts of better off dead/self-harm past 2 weeks Not at all       Follow Up Actions Taken  Patient counseled, no additional follow up at this time.   Medication was adjusted today.    Return in about 4 weeks (around 10/21/2022) for anxiety.    Caitlyn Thurman MD  [facility] RYLIE Chen is a 61 year old, presenting for the following health issues:  Recheck Medication      History of Present Illness       Reason for visit:  Mental    He eats 0-1 servings of fruits and vegetables daily.He consumes 0 sweetened beverage(s) daily.He exercises with enough effort to increase his heart rate 9 or less minutes per day.  He exercises with enough effort to increase his heart rate 3 or less days per week.   He is taking medications regularly.     States Zoloft went well for 2 weeks, then the last 2 weeks doesn't feel it was working as well.  Ran out a couple days ago.  Gets a light tightness in his chest and feeling of jitteriness. Stressful at work.  His symptoms only occur with stress, denies any chest tightness with exercise.  States a year ago had panic attack when [place].        Objective    /66   Pulse 81   Temp 97.7  F (36.5  C) (Temporal)   Resp 16   Wt 101.2 kg (223 lb)   SpO2 97%   BMI 32.37 kg/m    Body mass index is 32.37 kg/m .  Physical Exam   Gen: no apparent distress  Psych: Alert and oriented times 3;  coherent speech, normal   rate and volume, able to articulate logical thoughts, able   to abstract reason, no tangential thoughts, no hallucinations   or delusions  His affect is neutral.

## 2022-10-01 ENCOUNTER — APPOINTMENT (OUTPATIENT)
Dept: CT IMAGING | Facility: CLINIC | Age: 62
End: 2022-10-01
Attending: EMERGENCY MEDICINE
Payer: COMMERCIAL

## 2022-10-01 ENCOUNTER — HOSPITAL ENCOUNTER (EMERGENCY)
Facility: CLINIC | Age: 62
Discharge: HOME | End: 2022-10-01
Attending: EMERGENCY MEDICINE | Admitting: EMERGENCY MEDICINE
Payer: COMMERCIAL

## 2022-10-01 VITALS
SYSTOLIC BLOOD PRESSURE: 156 MMHG | TEMPERATURE: 97.1 F | HEIGHT: 70 IN | OXYGEN SATURATION: 97 % | RESPIRATION RATE: 16 BRPM | BODY MASS INDEX: 31.5 KG/M2 | WEIGHT: 220 LBS | DIASTOLIC BLOOD PRESSURE: 92 MMHG | HEART RATE: 64 BPM

## 2022-10-01 DIAGNOSIS — N20.1 LEFT URETERAL CALCULUS: ICD-10-CM

## 2022-10-01 LAB
ALBUMIN SERPL-MCNC: 3.9 G/DL (ref 3.4–5)
ALBUMIN UR-MCNC: 30 MG/DL
ALP SERPL-CCNC: 118 U/L (ref 40–150)
ALT SERPL W P-5'-P-CCNC: 35 U/L (ref 0–70)
ANION GAP SERPL CALCULATED.3IONS-SCNC: 8 MMOL/L (ref 3–14)
APPEARANCE UR: ABNORMAL
AST SERPL W P-5'-P-CCNC: 21 U/L (ref 0–45)
BASOPHILS # BLD AUTO: 0 10E3/UL (ref 0–0.2)
BASOPHILS NFR BLD AUTO: 0 %
BILIRUB SERPL-MCNC: 0.7 MG/DL (ref 0.2–1.3)
BILIRUB UR QL STRIP: NEGATIVE
BUN SERPL-MCNC: 21 MG/DL (ref 7–30)
CALCIUM SERPL-MCNC: 9.2 MG/DL (ref 8.5–10.1)
CHLORIDE BLD-SCNC: 110 MMOL/L (ref 94–109)
CO2 SERPL-SCNC: 23 MMOL/L (ref 20–32)
COLOR UR AUTO: YELLOW
CREAT SERPL-MCNC: 1 MG/DL (ref 0.66–1.25)
EOSINOPHIL # BLD AUTO: 0.1 10E3/UL (ref 0–0.7)
EOSINOPHIL NFR BLD AUTO: 1 %
ERYTHROCYTE [DISTWIDTH] IN BLOOD BY AUTOMATED COUNT: 13 % (ref 10–15)
GFR SERPL CREATININE-BSD FRML MDRD: 85 ML/MIN/1.73M2
GLUCOSE BLD-MCNC: 237 MG/DL (ref 70–99)
GLUCOSE UR STRIP-MCNC: 50 MG/DL
HCT VFR BLD AUTO: 46 % (ref 40–53)
HGB BLD-MCNC: 16.1 G/DL (ref 13.3–17.7)
HGB UR QL STRIP: ABNORMAL
IMM GRANULOCYTES # BLD: 0 10E3/UL
IMM GRANULOCYTES NFR BLD: 0 %
KETONES UR STRIP-MCNC: 5 MG/DL
LEUKOCYTE ESTERASE UR QL STRIP: NEGATIVE
LIPASE SERPL-CCNC: 151 U/L (ref 73–393)
LYMPHOCYTES # BLD AUTO: 1.1 10E3/UL (ref 0.8–5.3)
LYMPHOCYTES NFR BLD AUTO: 11 %
MCH RBC QN AUTO: 31.3 PG (ref 26.5–33)
MCHC RBC AUTO-ENTMCNC: 35 G/DL (ref 31.5–36.5)
MCV RBC AUTO: 89 FL (ref 78–100)
MONOCYTES # BLD AUTO: 0.6 10E3/UL (ref 0–1.3)
MONOCYTES NFR BLD AUTO: 6 %
MUCOUS THREADS #/AREA URNS LPF: PRESENT /LPF
NEUTROPHILS # BLD AUTO: 8.4 10E3/UL (ref 1.6–8.3)
NEUTROPHILS NFR BLD AUTO: 82 %
NITRATE UR QL: NEGATIVE
NRBC # BLD AUTO: 0 10E3/UL
NRBC BLD AUTO-RTO: 0 /100
PH UR STRIP: 5 [PH] (ref 5–7)
PLATELET # BLD AUTO: 192 10E3/UL (ref 150–450)
POTASSIUM BLD-SCNC: 3.9 MMOL/L (ref 3.4–5.3)
PROT SERPL-MCNC: 6.7 G/DL (ref 6.8–8.8)
RBC # BLD AUTO: 5.15 10E6/UL (ref 4.4–5.9)
RBC URINE: 135 /HPF
SODIUM SERPL-SCNC: 141 MMOL/L (ref 133–144)
SP GR UR STRIP: 1.02 (ref 1–1.03)
UROBILINOGEN UR STRIP-MCNC: NORMAL MG/DL
WBC # BLD AUTO: 10.4 10E3/UL (ref 4–11)
WBC URINE: 1 /HPF

## 2022-10-01 PROCEDURE — 80053 COMPREHEN METABOLIC PANEL: CPT | Performed by: EMERGENCY MEDICINE

## 2022-10-01 PROCEDURE — 83690 ASSAY OF LIPASE: CPT | Performed by: EMERGENCY MEDICINE

## 2022-10-01 PROCEDURE — 250N000011 HC RX IP 250 OP 636: Performed by: EMERGENCY MEDICINE

## 2022-10-01 PROCEDURE — 96374 THER/PROPH/DIAG INJ IV PUSH: CPT | Performed by: EMERGENCY MEDICINE

## 2022-10-01 PROCEDURE — 74176 CT ABD & PELVIS W/O CONTRAST: CPT

## 2022-10-01 PROCEDURE — 81001 URINALYSIS AUTO W/SCOPE: CPT | Performed by: EMERGENCY MEDICINE

## 2022-10-01 PROCEDURE — 85025 COMPLETE CBC W/AUTO DIFF WBC: CPT | Performed by: EMERGENCY MEDICINE

## 2022-10-01 PROCEDURE — 96375 TX/PRO/DX INJ NEW DRUG ADDON: CPT | Performed by: EMERGENCY MEDICINE

## 2022-10-01 PROCEDURE — 99285 EMERGENCY DEPT VISIT HI MDM: CPT | Mod: 25 | Performed by: EMERGENCY MEDICINE

## 2022-10-01 PROCEDURE — 36415 COLL VENOUS BLD VENIPUNCTURE: CPT | Performed by: EMERGENCY MEDICINE

## 2022-10-01 PROCEDURE — 99285 EMERGENCY DEPT VISIT HI MDM: CPT | Performed by: EMERGENCY MEDICINE

## 2022-10-01 RX ORDER — HYDROMORPHONE HYDROCHLORIDE 1 MG/ML
0.5 INJECTION, SOLUTION INTRAMUSCULAR; INTRAVENOUS; SUBCUTANEOUS ONCE
Status: COMPLETED | OUTPATIENT
Start: 2022-10-01 | End: 2022-10-01

## 2022-10-01 RX ORDER — ONDANSETRON 4 MG/1
4 TABLET, ORALLY DISINTEGRATING ORAL EVERY 6 HOURS PRN
Qty: 15 TABLET | Refills: 0 | Status: SHIPPED | OUTPATIENT
Start: 2022-10-01

## 2022-10-01 RX ORDER — ONDANSETRON 2 MG/ML
4 INJECTION INTRAMUSCULAR; INTRAVENOUS
Status: COMPLETED | OUTPATIENT
Start: 2022-10-01 | End: 2022-10-01

## 2022-10-01 RX ORDER — HYDROCODONE BITARTRATE AND ACETAMINOPHEN 5; 325 MG/1; MG/1
1 TABLET ORAL EVERY 6 HOURS PRN
Qty: 15 TABLET | Refills: 0 | Status: SHIPPED | OUTPATIENT
Start: 2022-10-01 | End: 2023-12-28

## 2022-10-01 RX ORDER — ONDANSETRON 2 MG/ML
INJECTION INTRAMUSCULAR; INTRAVENOUS
Status: DISCONTINUED
Start: 2022-10-01 | End: 2022-10-01 | Stop reason: HOSPADM

## 2022-10-01 RX ORDER — KETOROLAC TROMETHAMINE 30 MG/ML
30 INJECTION, SOLUTION INTRAMUSCULAR; INTRAVENOUS
Status: COMPLETED | OUTPATIENT
Start: 2022-10-01 | End: 2022-10-01

## 2022-10-01 RX ADMIN — KETOROLAC TROMETHAMINE 30 MG: 30 INJECTION, SOLUTION INTRAMUSCULAR; INTRAVENOUS at 09:57

## 2022-10-01 RX ADMIN — HYDROMORPHONE HYDROCHLORIDE 0.5 MG: 1 INJECTION, SOLUTION INTRAMUSCULAR; INTRAVENOUS; SUBCUTANEOUS at 08:34

## 2022-10-01 RX ADMIN — ONDANSETRON 4 MG: 2 INJECTION INTRAMUSCULAR; INTRAVENOUS at 08:15

## 2022-10-01 ASSESSMENT — ENCOUNTER SYMPTOMS
SHORTNESS OF BREATH: 0
PALPITATIONS: 0
WHEEZING: 0
CHEST TIGHTNESS: 0
COUGH: 0

## 2022-10-01 ASSESSMENT — ACTIVITIES OF DAILY LIVING (ADL): ADLS_ACUITY_SCORE: 35

## 2022-10-01 NOTE — ED TRIAGE NOTES
Pt presents with concerns of lower abdominal pain.  Pt states that it started this morning and is getting worse.  Pt states that he goes to the bathroom, but unable to go when he gets there. Pt recently had his Sertraline dose doubled.  Pt is having more frequent panic attacks, feels like one now.      Triage Assessment     Row Name 10/01/22 0738       Triage Assessment (Adult)    Airway WDL WDL       Respiratory WDL    Respiratory WDL WDL       Skin Circulation/Temperature WDL    Skin Circulation/Temperature WDL WDL       Cardiac WDL    Cardiac WDL WDL       Peripheral/Neurovascular WDL    Peripheral Neurovascular WDL WDL       Cognitive/Neuro/Behavioral WDL    Cognitive/Neuro/Behavioral WDL WDL

## 2022-10-01 NOTE — ED PROVIDER NOTES
History     Chief Complaint   Patient presents with     Abdominal Pain     HPI  Zackery Rogers is a 61 year old male who presents with suprapubic and hypogastric abdominal pain.  Onset this morning.  Nausea without emesis.  When the pain was severe he did have some cold sweats but no documented fever.  Rates pain 9/10 intensity.  Has noted some urinary hesitancy this morning and try to go to the bathroom after arriving in the ER.  States he has not had any prior difficulty urinating.  Does urinate frequently but he attributes that to drinking lots of water and having elevated blood glucose values.  Does have a document history for prostate enlargement on colonoscopy 4 years ago and CT abdomen pelvis 2 years ago.  Has not had a good normal large bowel movement but has been passing more frequent smaller stools.  No diarrhea.  Admits to being very anxious.  Having more panic.  Doctor recently increased sertraline from 50 mg to 100 mg daily.  No prior kidney stones.    Allergies:  No Known Allergies    Problem List:    Patient Active Problem List    Diagnosis Date Noted     Anxiety 09/23/2022     Priority: Medium     History of shingles - right hip 10/04/2021     Priority: Medium     Skin lesion - right upper lip above the vermillion border 07/13/2020     Priority: Medium     Type 2 diabetes mellitus without complication, without long-term current use of insulin (H) 03/07/2019     Priority: Medium     History of colonic polyps 09/04/2018     Priority: Medium     Essential hypertension 09/04/2018     Priority: Medium     Rosacea 09/04/2018     Priority: Medium     Swelling of both ankles 05/10/2017     Priority: Medium     Night sweats 05/10/2017     Priority: Medium     Obesity (BMI 30.0-34.9) 02/09/2015     Priority: Medium        Past Medical History:    Past Medical History:   Diagnosis Date     DISH (diffuse idiopathic skeletal hyperostosis)      History of acute pancreatitis 2015     Hypertension      Obesity       Pancreatic mass      Rosacea      Type 2 diabetes mellitus with other specified complication (H)        Past Surgical History:    Past Surgical History:   Procedure Laterality Date     COLONOSCOPY N/A 10/1/2018    Procedure: COLONOSCOPY;  Colonoscopy;  Surgeon: Rajinder Ridley MD;  Location: [facility]     ENDOSCOPIC RETROGRADE CHOLANGIOPANCREATOGRAM, NECROSECTOMY N/A 6/5/2017    Procedure: ENDOSCOPIC RETROGRADE CHOLANGIOPANCREATOGRAM, NECROSECTOMY;  Endoscopic Retrograde Chlangiopancreatogram, Necrosectomy ;  Surgeon: Guru Diana Navarro MD;  Location: [facility]     ENDOSCOPIC ULTRASOUND UPPER GASTROINTESTINAL TRACT (GI) N/A 5/19/2017    Procedure: ENDOSCOPIC ULTRASOUND, ESOPHAGOSCOPY / UPPER GASTROINTESTINAL TRACT (GI);  Upper Endoscopic Ultrasound with with cyst gastrostomy, hot axios stent;  Surgeon: Guru Diana Navarro MD;  Location: [facility]     NO HISTORY OF SURGERY         Family History:    Family History   Problem Relation Age of Onset     Alzheimer Disease Mother      Myocardial Infarction Mother      Cerebrovascular Disease Father      Family History Negative Brother      Family History Negative Sister      Family History Negative Brother      Family History Negative Brother        Social History:  Marital Status:   [2]  Social History     Tobacco Use     Smoking status: Never Smoker     Smokeless tobacco: Never Used   Vaping Use     Vaping Use: Never used   Substance Use Topics     Alcohol use: Yes     Comment: 1 x year     Drug use: No        Medications:    alcohol swab prep pads  aspirin (ASA) 81 MG chewable tablet  blood glucose (NO BRAND SPECIFIED) test strip  blood glucose calibration (NO BRAND SPECIFIED) solution  blood glucose monitoring (NO BRAND SPECIFIED) meter device kit  hydrOXYzine (VISTARIL) 25 MG capsule  insulin glargine (BASAGLAR KWIKPEN) 100 UNIT/ML pen  insulin pen needle (31G X 6 MM) 31G X 6 MM miscellaneous  lisinopril (ZESTRIL) 40 MG  "tablet  metFORMIN (GLUCOPHAGE XR) 500 MG 24 hr tablet  minocycline (MINOCIN/DYNACIN) 100 MG capsule  sertraline (ZOLOFT) 100 MG tablet  simvastatin (ZOCOR) 20 MG tablet  thin (NO BRAND SPECIFIED) lancets          Review of Systems   Respiratory: Negative for cough, chest tightness, shortness of breath and wheezing.    Cardiovascular: Negative for chest pain, palpitations and leg swelling.   All other systems reviewed and are negative.      Physical Exam   BP: (!) 149/87  Pulse: 58  Temp: 97.1  F (36.2  C)  Resp: 22  Height: 177.8 cm (5' 10\")  Weight: 99.8 kg (220 lb)  SpO2: 100 %      Physical Exam  Vitals and nursing note reviewed.   Constitutional:       General: He is not in acute distress.     Appearance: He is obese.      Comments: Very anxious   HENT:      Mouth/Throat:      Mouth: Mucous membranes are moist.   Eyes:      General: No scleral icterus.     Extraocular Movements: Extraocular movements intact.      Pupils: Pupils are equal, round, and reactive to light.   Cardiovascular:      Rate and Rhythm: Normal rate and regular rhythm.      Heart sounds: Normal heart sounds. No murmur heard.  Pulmonary:      Effort: Pulmonary effort is normal.      Breath sounds: Normal breath sounds. No wheezing.   Abdominal:      Hernia: No hernia is present.      Comments: Abdomen is nondistended.  Bowel sounds are present.  No guarding or rebound.  Anxiousness makes it hard to get him to focus on location of pain but seem to be suprapubic and in the lower abdomen more in the left lower quadrant.  No pain over McBurney's point.  No CVA tenderness.   Skin:     General: Skin is warm.      Capillary Refill: Capillary refill takes less than 2 seconds.      Comments: Mildly diaphoretic   Neurological:      General: No focal deficit present.      Mental Status: He is alert.   Psychiatric:         Mood and Affect: Mood normal.         ED Course                 Procedures                Results for orders placed or performed " during the hospital encounter of 10/01/22 (from the past 24 hour(s))   UA with Microscopic reflex to Culture    Specimen: Urine, Midstream   Result Value Ref Range    Color Urine Yellow Colorless, Straw, Light Yellow, Yellow    Appearance Urine Slightly Cloudy (A) Clear    Glucose Urine 50  (A) Negative mg/dL    Bilirubin Urine Negative Negative    Ketones Urine 5  (A) Negative mg/dL    Specific Gravity Urine 1.025 1.003 - 1.035    Blood Urine Large (A) Negative    pH Urine 5.0 5.0 - 7.0    Protein Albumin Urine 30  (A) Negative mg/dL    Urobilinogen Urine Normal Normal, 2.0 mg/dL    Nitrite Urine Negative Negative    Leukocyte Esterase Urine Negative Negative    Mucus Urine Present (A) None Seen /LPF    RBC Urine 135 (H) <=2 /HPF    WBC Urine 1 <=5 /HPF    Narrative    Urine Culture not indicated   CBC with platelets differential    Narrative    The following orders were created for panel order CBC with platelets differential.  Procedure                               Abnormality         Status                     ---------                               -----------         ------                     CBC with platelets and d...[[id number]]  Abnormal            Final result                 Please view results for these tests on the individual orders.   Comprehensive metabolic panel   Result Value Ref Range    Sodium 141 133 - 144 mmol/L    Potassium 3.9 3.4 - 5.3 mmol/L    Chloride 110 (H) 94 - 109 mmol/L    Carbon Dioxide (CO2) 23 20 - 32 mmol/L    Anion Gap 8 3 - 14 mmol/L    Urea Nitrogen 21 7 - 30 mg/dL    Creatinine 1.00 0.66 - 1.25 mg/dL    Calcium 9.2 8.5 - 10.1 mg/dL    Glucose 237 (H) 70 - 99 mg/dL    Alkaline Phosphatase 118 40 - 150 U/L    AST 21 0 - 45 U/L    ALT 35 0 - 70 U/L    Protein Total 6.7 (L) 6.8 - 8.8 g/dL    Albumin 3.9 3.4 - 5.0 g/dL    Bilirubin Total 0.7 0.2 - 1.3 mg/dL    GFR Estimate 85 >60 mL/min/1.73m2   Lipase   Result Value Ref Range    Lipase 151 73 - 393 U/L   CBC with platelets and  differential   Result Value Ref Range    WBC Count 10.4 4.0 - 11.0 10e3/uL    RBC Count 5.15 4.40 - 5.90 10e6/uL    Hemoglobin 16.1 13.3 - 17.7 g/dL    Hematocrit 46.0 40.0 - 53.0 %    MCV 89 78 - 100 fL    MCH 31.3 26.5 - 33.0 pg    MCHC 35.0 31.5 - 36.5 g/dL    RDW 13.0 10.0 - 15.0 %    Platelet Count 192 150 - 450 10e3/uL    % Neutrophils 82 %    % Lymphocytes 11 %    % Monocytes 6 %    % Eosinophils 1 %    % Basophils 0 %    % Immature Granulocytes 0 %    NRBCs per 100 WBC 0 <1 /100    Absolute Neutrophils 8.4 (H) 1.6 - 8.3 10e3/uL    Absolute Lymphocytes 1.1 0.8 - 5.3 10e3/uL    Absolute Monocytes 0.6 0.0 - 1.3 10e3/uL    Absolute Eosinophils 0.1 0.0 - 0.7 10e3/uL    Absolute Basophils 0.0 0.0 - 0.2 10e3/uL    Absolute Immature Granulocytes 0.0 <=0.4 10e3/uL    Absolute NRBCs 0.0 10e3/uL   CT Abdomen Pelvis w/o Contrast    Narrative    EXAM: CT ABDOMEN PELVIS W/O CONTRAST  LOCATION: [facility]  DATE/TIME: 10/1/2022 9:28 AM    INDICATION: Suprapubic and left lower quadrant abdominal pain with hematuria.  Possible ureteral stone  COMPARISON: 02/18/2020  TECHNIQUE: CT scan of the abdomen and pelvis was performed without IV contrast. Multiplanar reformats were obtained. Dose reduction techniques were used.  CONTRAST: None.    FINDINGS:   LOWER CHEST: Partly visualized calcified mediastinal lymph nodes. Severe coronary artery calcifications. Mild bibasilar atelectasis.    HEPATOBILIARY: Hepatic steatosis. Punctate calcified hepatic granulomas.    PANCREAS: Stable atrophic pancreatic parenchyma in the body and preserved parenchyma in the head and tail. No peripancreatic inflammatory changes.    SPLEEN: Multiple calcified splenic granulomas. Stable small cyst in the superior spleen.    ADRENAL GLANDS: Normal.    KIDNEYS/BLADDER: Obstructing calculus in the distal left ureter measuring 3 mm (series 2, image 215) with mild left hydroureteronephrosis and perinephric stranding. Few  parapelvic cysts in the kidneys bilaterally requiring no specific follow-up. No   intrarenal calculi. No right-sided hydronephrosis or perinephric stranding.    BOWEL: Normal.    LYMPH NODES: No lymphadenopathy.    VASCULATURE: No abdominal aortic aneurysm. Multiple venous collaterals in the left upper quadrant of the abdomen are stable, presumably related to chronic occlusion of the splenic.    PELVIC ORGANS: No pelvic masses. No free fluid or fluid collections. No extraluminal air.    MUSCULOSKELETAL: Degenerative changes in the spine. No suspicious lesions in the bones.      Impression    IMPRESSION:   1.  Left distal ureteral 3 mm obstructing calculus with mild left hydroureteronephrosis and perinephric stranding.  2.  Hepatic steatosis.  3.  Stable sequela of chronic pancreatitis with atrophic pancreatic parenchyma in the mid body and stable evidence of chronic splenic vein occlusion.         Medications - No data to display    Assessments & Plan (with Medical Decision Making)  Gustavo is 61 years of age.  Presents with a suprapubic and left lower quadrant abdominal pain.  Presented with a nonsurgical abdomen.  Noted that he had hematuria.  This led to obtaining a CT which confirmed he is a distal ureteral stone on the left that measures 3 mm.  Its near the UVJ.  He has mild hydronephrosis.  No signs for any urinary tract infection.  No leukocytosis no fever.  Discharge home with hydrocodone and Zofran.  Urology follow-up if symptoms do not resolve by Monday or Tuesday.  Return to ED if fever greater than 100.4 presents.     I have reviewed the nursing notes.    I have reviewed the findings, diagnosis, plan and need for follow up with the patient.      New Prescriptions    No medications on file       Final diagnoses:   Left ureteral calculus       10/1/2022   [facility] EMERGENCY DEPT     Cody Davenport DO  10/01/22 1012

## 2022-10-04 ENCOUNTER — NURSE TRIAGE (OUTPATIENT)
Dept: FAMILY MEDICINE | Facility: OTHER | Age: 62
End: 2022-10-04

## 2022-10-04 DIAGNOSIS — N20.0 CALCULUS OF KIDNEY: Primary | ICD-10-CM

## 2022-10-04 NOTE — TELEPHONE ENCOUNTER
Called Urology and talked with  who states the referral was received and normally they will work in patient within 2 days depending on pain and symptoms, when a referral comes over for kidney stones.     States she will forward this over to a nurse to call patient and triage pain and symptoms and they will schedule appropriately.     Patient was notified to wait for call.     KEKE WestonN, RN  [facility]  October 4, 2022

## 2022-10-04 NOTE — TELEPHONE ENCOUNTER
"Patient was in the emergency room on 10/1 for kidney stone.     Patient is in a lot of pain and was discharged with Hydrocodone and Ondansetron. Took pain medication this AM and laid back down, but now pain is worsening. States the pain medication is not lasting the 6 hours that it's supposed to.     Pain is not up to what it was when he went into ED but almost there. States the pain is lower left abdomen/pelvic area and is pretty intense.      No problems with urination. States he cannot find a thermometer but does not feel warm.     Per ED note:    \"Discharge home with hydrocodone and Zofran.  Urology follow-up if symptoms do not resolve by Monday or Tuesday.  Return to ED if fever greater than 100.4 presents.\"    Patient was told to follow-up with Urology if no improvement. There was no referral placed, and patient is in a lot of pain.    Explained to patient there is not much in clinic that provider can do for him other than pain medication. He needs to get in with Urology.     Advised patient is he has fever of 100.4 or higher, or pain worsens than he needs to be seen in ED again.     Routing to last provider that patient was seen by. Can provider place referral for Urology?     JUAN Weston, RN  [place]  October 4, 2022    Reason for Disposition    Patient wants to be seen    Additional Information    Negative: Sounds like a life-threatening emergency to the triager    Negative: Post-op and incision symptoms or questions    Negative: Post-op and general symptoms or questions    Negative: Symptoms arising from use of a urinary catheter (e.g., coude, Cohen)    Negative: Taking antibiotic for cellulitis (follow-up call)    Negative: Taking antibiotic for other infection (follow-up call)    Negative: Medication question    Negative: New symptom and not a possible complication of hospitalized condition    Negative: New-onset fever    Negative: Patient sounds very sick or weak to " the triager    Negative: Sounds like a serious complication to the triager    Negative: Condition / symptoms WORSE    Negative: Caller has URGENT question and triager unable to answer question    Protocols used: POST-HOSPITALIZATION FOLLOW-UP CALL-A-[place]

## 2022-10-06 ENCOUNTER — OFFICE VISIT (OUTPATIENT)
Dept: FAMILY MEDICINE | Facility: CLINIC | Age: 62
End: 2022-10-06
Payer: COMMERCIAL

## 2022-10-06 VITALS
BODY MASS INDEX: 31.57 KG/M2 | SYSTOLIC BLOOD PRESSURE: 146 MMHG | RESPIRATION RATE: 12 BRPM | DIASTOLIC BLOOD PRESSURE: 74 MMHG | TEMPERATURE: 97.9 F | OXYGEN SATURATION: 95 % | WEIGHT: 220 LBS | HEART RATE: 84 BPM

## 2022-10-06 DIAGNOSIS — N20.0 RENAL CALCULUS, LEFT: Primary | ICD-10-CM

## 2022-10-06 PROCEDURE — 99214 OFFICE O/P EST MOD 30 MIN: CPT | Performed by: FAMILY MEDICINE

## 2022-10-06 RX ORDER — TAMSULOSIN HYDROCHLORIDE 0.4 MG/1
0.4 CAPSULE ORAL DAILY
Qty: 30 CAPSULE | Refills: 0 | Status: SHIPPED | OUTPATIENT
Start: 2022-10-06 | End: 2023-12-28

## 2022-10-06 RX ORDER — SENNA AND DOCUSATE SODIUM 50; 8.6 MG/1; MG/1
1 TABLET, FILM COATED ORAL DAILY PRN
Qty: 30 TABLET | Refills: 0 | Status: SHIPPED | OUTPATIENT
Start: 2022-10-06

## 2022-10-06 ASSESSMENT — PAIN SCALES - GENERAL: PAINLEVEL: EXTREME PAIN (9)

## 2022-10-06 NOTE — PROGRESS NOTES
Assessment & Plan   1. Renal calculus, left: Patient with 3 mm left UVJ stone.  Good chance of passing.  Comfortable currently.  Has pain medication available.  Encourage fluid intake.  Offer Flomax to aid in passing stone.  Recommend senna for constipation associated with narcotic pain medication use.  Recommend sparing use of pain medications and not taking less having pain.  Discussed that stones may take days, weeks, months the past in some instances and mainly need to focus on pain control.  Given a strainer to collect the stone if possible to look at composition to help give further recommendations to aid in avoiding future stone production.  Patient agreeable plan.  - REVIEW OF HEALTH MAINTENANCE PROTOCOL ORDERS  - SENNA-docusate sodium (SENNA S) 8.6-50 MG tablet; Take 1 tablet by mouth daily as needed  Dispense: 30 tablet; Refill: 0  - tamsulosin (FLOMAX) 0.4 MG capsule; Take 1 capsule (0.4 mg) by mouth daily  Dispense: 30 capsule; Refill: 0  - Stone analysis; Future      Return in about 2 weeks (around 10/20/2022), or if symptoms worsen or fail to improve.    Trell Olsen MD   [facility]    This chart is completed utilizing dictation software; typos and/or incorrect word substitutions may unintentionally occur.      Subjective     Zackery Rogers is a 61 year old male who presents to clinic today for the following health issues:    History of Present Illness       Reason for visit:  Mental    He eats 0-1 servings of fruits and vegetables daily.He consumes 0 sweetened beverage(s) daily.He exercises with enough effort to increase his heart rate 9 or less minutes per day.  He exercises with enough effort to increase his heart rate 3 or less days per week.   He is taking medications regularly.       ED/UC Followup:    Facility:  [facility] Emergency Dept  Date of visit: 10/1/2022  Reason for visit: Left ureteral calculus  Current Status: felt better  for a few days but now left lower abdominal/pelvic pain-used last pain medication tablet today and wondering about more, stated no BM, urination is ok he stated    Patient was seen at the [place] emergency department on 10/1/2022 for abdominal pain and nausea.  Was having cold sweats as well.  Pain at that time was rated at 9 out of 10 intensity.  Also having some urinary hesitancy that morning.  Was found to have a UVJ junction 3 mm stone.    He was given Zofran for nausea and hydrocodone for pain and recommended follow-up with urology if symptoms do not improve by Monday or Tuesday.  Was also given return instructions today go to the emergency department if developing fever.    Pain had improved since that time with intermittent hydrocodone use.  On 10/4/2022 he had a resurgence in his pain.    Currently he is comfortable.  No longer having urinary hesitancy.  Wife was asking him to ask if constipation could be contributing given his hydrocodone use.    Review of Systems   Constitutional, cardiovascular, pulmonary, gi and gu systems are negative, except as otherwise noted.      Objective    BP (!) 146/74   Pulse 84   Temp 97.9  F (36.6  C) (Temporal)   Resp 12   Wt 99.8 kg (220 lb)   SpO2 95%   BMI 31.57 kg/m    Body mass index is 31.57 kg/m .  Physical Exam   General: Appears well and in no acute distress.  Cardiovascular: Regular rate and rhythm, normal S1 and S2 without murmur. No extra heartsounds or friction rub. Radial pulses present and equal bilaterally.  Respiratory: Lungs clear to auscultation bilaterally. No wheezing or crackles. No prolonged expiration. Symmetrical chest rise.  Musculoskeletal: No gross extremity deformities. No peripheral edema. Normal muscle bulk.    Labs: pending

## 2022-10-23 ENCOUNTER — HEALTH MAINTENANCE LETTER (OUTPATIENT)
Age: 62
End: 2022-10-23

## 2022-10-27 ENCOUNTER — TELEPHONE (OUTPATIENT)
Dept: FAMILY MEDICINE | Facility: OTHER | Age: 62
End: 2022-10-27

## 2022-10-27 NOTE — TELEPHONE ENCOUNTER
Reason for Call:  Appointment Request    Patient requesting this type of appt: Chronic Diease Management/Medication/Follow-Up    Requested provider: Dr Thurman    Reason patient unable to be scheduled: Not within requested timeframe    When does patient want to be seen/preferred time: 2 weeks    Comments: patient wondering if Dr Thurman would work him in early in the morning f2f    Could we send this information to you in [facility] or would you prefer to receive a phone call?:   Patient would prefer a phone call   Okay to leave a detailed message?: Yes at Home number on file [phone] (home)    Call taken on 10/27/2022 at 2:18 PM by Britt Stoddard

## 2022-10-28 NOTE — TELEPHONE ENCOUNTER
Increased anxiety medication.    Good for about 2 weeks working well and now still working but now is feeling jittery and a bit of a lull.    Scheduled a visit for mood med check follow up.    Aracelis Boothe RN  [facility] ~ Registered Nurse  Clinic Triage ~ [place] [facility]  October 28, 2022

## 2022-11-02 ENCOUNTER — OFFICE VISIT (OUTPATIENT)
Dept: FAMILY MEDICINE | Facility: OTHER | Age: 62
End: 2022-11-02
Payer: COMMERCIAL

## 2022-11-02 VITALS
BODY MASS INDEX: 31.71 KG/M2 | HEIGHT: 70 IN | WEIGHT: 221.5 LBS | SYSTOLIC BLOOD PRESSURE: 122 MMHG | OXYGEN SATURATION: 95 % | DIASTOLIC BLOOD PRESSURE: 82 MMHG | HEART RATE: 78 BPM | TEMPERATURE: 97.1 F | RESPIRATION RATE: 16 BRPM

## 2022-11-02 DIAGNOSIS — E78.2 MIXED HYPERLIPIDEMIA: ICD-10-CM

## 2022-11-02 DIAGNOSIS — E11.9 TYPE 2 DIABETES MELLITUS WITHOUT COMPLICATION, WITHOUT LONG-TERM CURRENT USE OF INSULIN (H): Primary | ICD-10-CM

## 2022-11-02 DIAGNOSIS — F41.9 ANXIETY: ICD-10-CM

## 2022-11-02 DIAGNOSIS — I10 ESSENTIAL HYPERTENSION: ICD-10-CM

## 2022-11-02 DIAGNOSIS — E08.9 DIABETES MELLITUS DUE TO UNDERLYING CONDITION WITHOUT COMPLICATION, UNSPECIFIED WHETHER LONG TERM INSULIN USE (H): ICD-10-CM

## 2022-11-02 LAB — HBA1C MFR BLD: 7.6 % (ref 0–5.6)

## 2022-11-02 PROCEDURE — 83036 HEMOGLOBIN GLYCOSYLATED A1C: CPT | Performed by: FAMILY MEDICINE

## 2022-11-02 PROCEDURE — 99214 OFFICE O/P EST MOD 30 MIN: CPT | Performed by: FAMILY MEDICINE

## 2022-11-02 PROCEDURE — 36415 COLL VENOUS BLD VENIPUNCTURE: CPT | Performed by: FAMILY MEDICINE

## 2022-11-02 RX ORDER — METFORMIN HCL 500 MG
1000 TABLET, EXTENDED RELEASE 24 HR ORAL 2 TIMES DAILY WITH MEALS
Qty: 360 TABLET | Refills: 1 | Status: SHIPPED | OUTPATIENT
Start: 2022-11-02 | End: 2023-12-11

## 2022-11-02 RX ORDER — SIMVASTATIN 20 MG
20 TABLET ORAL AT BEDTIME
Qty: 90 TABLET | Refills: 1 | Status: SHIPPED | OUTPATIENT
Start: 2022-11-02 | End: 2024-03-04

## 2022-11-02 RX ORDER — INSULIN GLARGINE 100 [IU]/ML
INJECTION, SOLUTION SUBCUTANEOUS
Qty: 15 ML | Refills: 2 | Status: SHIPPED | OUTPATIENT
Start: 2022-11-02 | End: 2023-06-13

## 2022-11-02 RX ORDER — SERTRALINE HYDROCHLORIDE 100 MG/1
100 TABLET, FILM COATED ORAL DAILY
Qty: 90 TABLET | Refills: 1 | Status: SHIPPED | OUTPATIENT
Start: 2022-11-02 | End: 2023-06-13

## 2022-11-02 RX ORDER — LISINOPRIL 40 MG/1
40 TABLET ORAL DAILY
Qty: 90 TABLET | Refills: 1 | Status: SHIPPED | OUTPATIENT
Start: 2022-11-02 | End: 2024-05-07

## 2022-11-02 ASSESSMENT — ANXIETY QUESTIONNAIRES
3. WORRYING TOO MUCH ABOUT DIFFERENT THINGS: SEVERAL DAYS
IF YOU CHECKED OFF ANY PROBLEMS ON THIS QUESTIONNAIRE, HOW DIFFICULT HAVE THESE PROBLEMS MADE IT FOR YOU TO DO YOUR WORK, TAKE CARE OF THINGS AT HOME, OR GET ALONG WITH OTHER PEOPLE: SOMEWHAT DIFFICULT
GAD7 TOTAL SCORE: 6
5. BEING SO RESTLESS THAT IT IS HARD TO SIT STILL: SEVERAL DAYS
GAD7 TOTAL SCORE: 6
6. BECOMING EASILY ANNOYED OR IRRITABLE: SEVERAL DAYS
2. NOT BEING ABLE TO STOP OR CONTROL WORRYING: NOT AT ALL
1. FEELING NERVOUS, ANXIOUS, OR ON EDGE: MORE THAN HALF THE DAYS
4. TROUBLE RELAXING: SEVERAL DAYS
8. IF YOU CHECKED OFF ANY PROBLEMS, HOW DIFFICULT HAVE THESE MADE IT FOR YOU TO DO YOUR WORK, TAKE CARE OF THINGS AT HOME, OR GET ALONG WITH OTHER PEOPLE?: SOMEWHAT DIFFICULT
7. FEELING AFRAID AS IF SOMETHING AWFUL MIGHT HAPPEN: NOT AT ALL
GAD7 TOTAL SCORE: 6
7. FEELING AFRAID AS IF SOMETHING AWFUL MIGHT HAPPEN: NOT AT ALL

## 2022-11-02 ASSESSMENT — PAIN SCALES - GENERAL: PAINLEVEL: NO PAIN (0)

## 2022-11-02 NOTE — PROGRESS NOTES
Assessment & Plan       ICD-10-CM    1. Type 2 diabetes mellitus without complication, without long-term current use of insulin (H)  E11.9 Hemoglobin A1c     Hemoglobin A1c      2. Anxiety  F41.9 sertraline (ZOLOFT) 100 MG tablet      3. Diabetes mellitus due to underlying condition without complication, unspecified whether long term insulin use (H)  E08.9 insulin glargine (BASAGLAR KWIKPEN) 100 UNIT/ML pen     metFORMIN (GLUCOPHAGE XR) 500 MG 24 hr tablet      4. Mixed hyperlipidemia  E78.2 simvastatin (ZOCOR) 20 MG tablet      5. Essential hypertension  I10 lisinopril (ZESTRIL) 40 MG tablet        Patient has been doing well with his blood pressure and his diabetes has come under better control as he is taking more time to manage this though still finds high blood sugars at times after eating at work as he is participating in the joint meals that are oftentimes carb heavy.  We talked about either taking his food to work to eat and not eating them served a meal or trying to get small portion sizes as it can be difficult to do a carb heavy meal.  Blood pressure is well controlled and this as well as his cholesterol medication were renewed today and we are planning to follow-up in 6 months  He also has been having significant anxiety that is increasing medications for controlling well.  He did have 1 episode where he had quite a long time to calm down from a panic attack and he was not sure what to do.  We talked about self cares and routines that were helpful for it he may do well with some education through a therapist and he has been offered this through work and so we did encourage him to complete the employee assistance program to assist this.    Review of the result(s) of each unique test - a1c  31 minutes spent on the date of the encounter doing chart review, history and exam, documentation and further activities per the note         No follow-ups on file.    Kim Bolivar MD, MD  [facility]  NOBLE Chen is a 62 year old, presenting for the following health issues:  Diabetes      History of Present Illness       Mental Health Follow-up:  Patient presents to follow-up on Anxiety.    Patient's anxiety since last visit has been:  Medium  The patient is having other symptoms associated with anxiety.  Any significant life events: No  Patient is feeling anxious or having panic attacks.  Patient has no concerns about alcohol or drug use.    Diabetes:   He presents for follow up of diabetes.  He is checking home blood glucose two times daily. He checks blood glucose before meals and at bedtime.  Blood glucose is sometimes over 200 and never under 70. When his blood glucose is low, the patient is asymptomatic for confusion, blurred vision, lethargy and reports not feeling dizzy, shaky, or weak.  He has no concerns regarding his diabetes at this time.  He is not experiencing numbness or burning in feet, excessive thirst, blurry vision, weight changes or redness, sores or blisters on feet. The patient has not had a diabetic eye exam in the last 12 months.         He eats 0-1 servings of fruits and vegetables daily.He consumes 0 sweetened beverage(s) daily.He exercises with enough effort to increase his heart rate 9 or less minutes per day.  He exercises with enough effort to increase his heart rate 3 or less days per week.   He is taking medications regularly.  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 But has had improvement of mood since starting the increased dose. But still had a panic attack recently still, though these are much improved. Seemed like meds were working well for 2-3 weeks and then hit again. Has not been taking the hydroxyzine.   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      Review of Systems   Constitutional, HEENT, cardiovascular, pulmonary, GI, , musculoskeletal,  "neuro, skin, endocrine and psych systems are negative, except as otherwise noted.      Objective    /82   Pulse 78   Temp 97.1  F (36.2  C) (Temporal)   Resp 16   Ht 1.778 m (5' 10\")   Wt 100.5 kg (221 lb 8 oz)   SpO2 95%   BMI 31.78 kg/m    Body mass index is 31.78 kg/m .  Physical Exam   GENERAL: healthy, alert and no distress  RESP: lungs clear to auscultation - no rales, rhonchi or wheezes  CV: regular rate and rhythm, normal S1 S2, no S3 or S4, no murmur, click or rub, no peripheral edema and peripheral pulses strong  MS: no gross musculoskeletal defects noted, no edema  SKIN: no suspicious lesions or rashes  NEURO: Normal strength and tone, mentation intact and speech normal  PSYCH: mentation appears normal, affect normal/bright                      "

## 2022-11-12 DIAGNOSIS — Z29.9 PREVENTIVE MEASURE: ICD-10-CM

## 2022-11-17 RX ORDER — ASPIRIN 81 MG/1
TABLET, CHEWABLE ORAL
Qty: 90 TABLET | Refills: 1 | Status: SHIPPED | OUTPATIENT
Start: 2022-11-17 | End: 2023-06-01

## 2022-11-28 ENCOUNTER — TELEPHONE (OUTPATIENT)
Dept: FAMILY MEDICINE | Facility: OTHER | Age: 62
End: 2022-11-28

## 2022-11-28 NOTE — TELEPHONE ENCOUNTER
sertraline (ZOLOFT) 100 MG tablet    Prior Authorization Retail Medication Request    Medication/Dose:   ICD code (if different than what is on RX):    Previously Tried and Failed:    Rationale:      Insurance Name:    Insurance ID:        Pharmacy Information (if different than what is on RX)  Name:    Phone:

## 2022-11-29 NOTE — TELEPHONE ENCOUNTER
Prior Authorization Not Needed per Insurance    Medication: sertraline (ZOLOFT) 100 MG tablet  Insurance Company: [employer] ([facility]) - Phone [phone] Fax [phone]  Expected CoPay:      Pharmacy Filling the Rx: [id number] [place], [place] - [address] [place] [address]  Pharmacy Notified: Yes  Patient Notified: Yes    Spoke with pharmacy, no PA needed. Patient has picked up med.

## 2023-03-22 ENCOUNTER — VIRTUAL VISIT (OUTPATIENT)
Dept: FAMILY MEDICINE | Facility: CLINIC | Age: 63
End: 2023-03-22
Payer: COMMERCIAL

## 2023-03-22 DIAGNOSIS — E11.9 TYPE 2 DIABETES MELLITUS WITHOUT COMPLICATION, WITHOUT LONG-TERM CURRENT USE OF INSULIN (H): Primary | ICD-10-CM

## 2023-03-22 DIAGNOSIS — F41.1 GAD (GENERALIZED ANXIETY DISORDER): ICD-10-CM

## 2023-03-22 DIAGNOSIS — F41.0 PANIC ATTACK: ICD-10-CM

## 2023-03-22 PROCEDURE — 99214 OFFICE O/P EST MOD 30 MIN: CPT | Mod: VID | Performed by: STUDENT IN AN ORGANIZED HEALTH CARE EDUCATION/TRAINING PROGRAM

## 2023-03-22 RX ORDER — BUSPIRONE HYDROCHLORIDE 5 MG/1
5 TABLET ORAL 3 TIMES DAILY
Qty: 180 TABLET | Refills: 3 | Status: SHIPPED | OUTPATIENT
Start: 2023-03-22 | End: 2023-12-28

## 2023-03-22 ASSESSMENT — ANXIETY QUESTIONNAIRES
1. FEELING NERVOUS, ANXIOUS, OR ON EDGE: NEARLY EVERY DAY
8. IF YOU CHECKED OFF ANY PROBLEMS, HOW DIFFICULT HAVE THESE MADE IT FOR YOU TO DO YOUR WORK, TAKE CARE OF THINGS AT HOME, OR GET ALONG WITH OTHER PEOPLE?: VERY DIFFICULT
3. WORRYING TOO MUCH ABOUT DIFFERENT THINGS: SEVERAL DAYS
2. NOT BEING ABLE TO STOP OR CONTROL WORRYING: SEVERAL DAYS
GAD7 TOTAL SCORE: 12
4. TROUBLE RELAXING: MORE THAN HALF THE DAYS
IF YOU CHECKED OFF ANY PROBLEMS ON THIS QUESTIONNAIRE, HOW DIFFICULT HAVE THESE PROBLEMS MADE IT FOR YOU TO DO YOUR WORK, TAKE CARE OF THINGS AT HOME, OR GET ALONG WITH OTHER PEOPLE: VERY DIFFICULT
5. BEING SO RESTLESS THAT IT IS HARD TO SIT STILL: MORE THAN HALF THE DAYS
7. FEELING AFRAID AS IF SOMETHING AWFUL MIGHT HAPPEN: SEVERAL DAYS
6. BECOMING EASILY ANNOYED OR IRRITABLE: MORE THAN HALF THE DAYS
GAD7 TOTAL SCORE: 12
GAD7 TOTAL SCORE: 12
7. FEELING AFRAID AS IF SOMETHING AWFUL MIGHT HAPPEN: SEVERAL DAYS

## 2023-03-22 ASSESSMENT — PATIENT HEALTH QUESTIONNAIRE - PHQ9
SUM OF ALL RESPONSES TO PHQ QUESTIONS 1-9: 14
SUM OF ALL RESPONSES TO PHQ QUESTIONS 1-9: 14
10. IF YOU CHECKED OFF ANY PROBLEMS, HOW DIFFICULT HAVE THESE PROBLEMS MADE IT FOR YOU TO DO YOUR WORK, TAKE CARE OF THINGS AT HOME, OR GET ALONG WITH OTHER PEOPLE: VERY DIFFICULT

## 2023-03-22 NOTE — PROGRESS NOTES
"Gustavo is a 62 year old who is being evaluated via a billable video visit.      How would you like to obtain your AVS? MyChart  If the video visit is dropped, the invitation should be resent by: Text to cell phone: [phone]  Will anyone else be joining your video visit? No          Assessment & Plan     LINDA (generalized anxiety disorder)  Panic attack  Plan to continue sertraline at 100 mg and add BuSpar daily.  Would have him continue hydroxyzine as needed for panic attacks and consider nightly use if having trouble with anxiety and sleeping.  Discussed sleep hygiene as well.  Would have him consider follow-up with psychology once again.  Follow-up with me in 2 months regarding medication.  - busPIRone (BUSPAR) 5 MG tablet  Dispense: 180 tablet; Refill: 3    Type 2 diabetes mellitus without complication, without long-term current use of insulin (H)  Previous A1c within goal.  Encouraged continued improvement with diet and exercise and we will obtain A1c today.  - Hemoglobin A1c       BMI:   Estimated body mass index is 31.78 kg/m  as calculated from the following:    Height as of 11/2/22: 1.778 m (5' 10\").    Weight as of 11/2/22: 100.5 kg (221 lb 8 oz).   Weight management plan: Discussed healthy diet and exercise guidelines    Depression Screening Follow Up    PHQ 3/22/2023   PHQ-9 Total Score 14   Q9: Thoughts of better off dead/self-harm past 2 weeks Not at all     Last PHQ-9 3/22/2023   1.  Little interest or pleasure in doing things 2   2.  Feeling down, depressed, or hopeless 2   3.  Trouble falling or staying asleep, or sleeping too much 2   4.  Feeling tired or having little energy 2   5.  Poor appetite or overeating 2   6.  Feeling bad about yourself 0   7.  Trouble concentrating 2   8.  Moving slowly or restless 2   Q9: Thoughts of better off dead/self-harm past 2 weeks 0   PHQ-9 Total Score 14       Follow Up Actions Taken  Patient working on calming techniques at home  Has crisis line " available  Consider follow-up with psychology in the future  See him back in 2 months regarding anxiety and depression.      Rafiq Devlin MD  [facility] JEANMARIE Chen is a 62 year old, presenting for the following health issues:  Recheck Medication (Increase zoloft dosage)  No flowsheet data found.  History of Present Illness       Mental Health Follow-up:  Patient presents to follow-up on Depression & Anxiety.Patient's depression since last visit has been:  Worse  The patient is having other symptoms associated with depression.  Patient's anxiety since last visit has been:  Worse  The patient is having other symptoms associated with anxiety.  Any significant life events: job concerns  Patient is feeling anxious or having panic attacks.  Patient has no concerns about alcohol or drug use.    He eats 0-1 servings of fruits and vegetables daily.He consumes 0 sweetened beverage(s) daily.He exercises with enough effort to increase his heart rate 9 or less minutes per day.  He exercises with enough effort to increase his heart rate 3 or less days per week.   He is taking medications regularly.    Today's PHQ-9         PHQ-9 Total Score: 14    PHQ-9 Q9 Thoughts of better off dead/self-harm past 2 weeks :   Not at all    How difficult have these problems made it for you to do your work, take care of things at home, or get along with other people: Very difficult  Today's LINDA-7 Score: 12     Patient with anxiety and panic attacks worsened over the last 6 months.  Was started on sertraline and did well and dose increased to 100 mg.  Tolerated but has had some trouble sleeping.  Was also given hydroxyzine for as needed panic attacks which has been helpful but did not work for recent event yesterday.  In general things are much improved but still having episodes.  Thinks mood is mostly okay.  Also has diabetes which has been fairly stable with 22 units of insulin and metformin.  Patient no  chest pain shortness of breath or breathing issues.  No numbness or tingling in the hands or feet.    Review of Systems   Constitutional, HEENT, cardiovascular, pulmonary, gi and gu systems are negative, except as otherwise noted.      Objective           Vitals:  No vitals were obtained today due to virtual visit.    Physical Exam   GENERAL: Healthy, alert and no distress  EYES: Eyes grossly normal to inspection.  No discharge or erythema, or obvious scleral/conjunctival abnormalities.  RESP: No audible wheeze, cough, or visible cyanosis.  No visible retractions or increased work of breathing.    SKIN: Visible skin clear. No significant rash, abnormal pigmentation or lesions.  NEURO: Cranial nerves grossly intact.  Mentation and speech appropriate for age.  PSYCH: Mentation appears normal, affect normal/bright, judgement and insight intact, normal speech and appearance well-groomed.          Video-Visit Details    Type of service:  Video Visit     Originating Location (pt. Location): Home    Distant Location (provider location):  On-site  Platform used for Video Visit: [employer]Well

## 2023-04-02 ENCOUNTER — HEALTH MAINTENANCE LETTER (OUTPATIENT)
Age: 63
End: 2023-04-02

## 2023-04-05 NOTE — LETTER
March 19, 2019      Zackery Rogers  [id number] [facility] [id number]        Dear ,    We are writing to inform you of your test results.    We were unable to to reach you by phone. We wanted to let you know that the other blood tests came back and it does not appear that your diabetes is type 1 diabetes, which is good news, as that is the type of diabetes that is harder to control. We still want you to set up an appointment with diabetic education to get teaching on starting insulin once a day. I am working on finding out which insulin will be best covered by your insurance.     Component      Latest Ref Rng & Units 3/7/2019   Creatinine Urine      mg/dL 106   Albumin Urine mg/L      mg/L 6   Albumin Urine mg/g Cr      0 - 17 mg/g Cr 5.88   Islet Cell Antibody IgG      <1:4 <1:4   Hemoglobin A1C      0 - 5.6 % 12.7 (H)   Insulin      3 - 25 mU/L 8.9   TSH      0.40 - 4.00 mU/L 1.33     If you have any questions or concerns, please call the clinic at the number listed above.       Sincerely,    DINH Somers CNP                  
Continue Regimen: Spironolactone, increasing to three pills daily
Initiate Treatment: Tretinoin 0.05% cream every other night, increasing to nightly if tolerated
Detail Level: Zone

## 2023-06-01 ENCOUNTER — HEALTH MAINTENANCE LETTER (OUTPATIENT)
Age: 63
End: 2023-06-01

## 2023-06-01 DIAGNOSIS — Z29.9 PREVENTIVE MEASURE: ICD-10-CM

## 2023-06-01 RX ORDER — ASPIRIN 81 MG/1
TABLET, CHEWABLE ORAL
Qty: 90 TABLET | Refills: 1 | Status: SHIPPED | OUTPATIENT
Start: 2023-06-01 | End: 2023-12-05

## 2023-06-10 DIAGNOSIS — E08.9 DIABETES MELLITUS DUE TO UNDERLYING CONDITION WITHOUT COMPLICATION, UNSPECIFIED WHETHER LONG TERM INSULIN USE (H): ICD-10-CM

## 2023-06-10 DIAGNOSIS — F41.9 ANXIETY: ICD-10-CM

## 2023-06-12 NOTE — TELEPHONE ENCOUNTER
"Pending Prescriptions:                       Disp   Refills    sertraline (ZOLOFT) 100 MG tablet [Pharmac*90 tab*0        Sig: Take 1 tablet (100 mg) by mouth daily    insulin glargine (BASAGLAR KWIKPEN) 100 UN*15 mL  0        Sig: Inject 22 Units under the skin (Subcutaneously) at           bedtime.    Routing refill request to provider for review/approval because:  Labs not current:  A1C  A break in medication    Requested Prescriptions   Pending Prescriptions Disp Refills    sertraline (ZOLOFT) 100 MG tablet [Pharmacy Med Name: Sertraline HCl Oral Tablet 100 MG] 90 tablet 0     Sig: Take 1 tablet (100 mg) by mouth daily       SSRIs Protocol Passed - 6/12/2023 12:12 PM        Passed - Recent (12 mo) or future (30 days) visit within the authorizing provider's specialty     Patient has had an office visit with the authorizing provider or a provider within the authorizing providers department within the previous 12 mos or has a future within next 30 days. See \"Patient Info\" tab in inbasket, or \"Choose Columns\" in Meds & Orders section of the refill encounter.              Passed - Medication is active on med list        Passed - Patient is age 18 or older          insulin glargine (BASAGLAR KWIKPEN) 100 UNIT/ML pen [Pharmacy Med Name: Basaglar KwikPen Subcutaneous Solution Pen-injector 100 UNIT/ML] 15 mL 0     Sig: Inject 22 Units under the skin (Subcutaneously) at bedtime.       Long Acting Insulin Protocol Failed - 6/12/2023 12:12 PM        Failed - HgbA1C in past 3 or 6 months     If HgbA1C is 8 or greater, it needs to be on file within the past 3 months.  If less than 8, must be on file within the past 6 months.     Recent Labs   Lab Test 11/02/22  0844   A1C 7.6*             Passed - Serum creatinine on file in past 12 months     Recent Labs   Lab Test 10/01/22  0824 07/13/20  1621 02/18/20  0816   CR 1.00   < >  --    CREAT  --   --  0.9    < > = values in this interval not displayed.       Ok to refill " "medication if creatinine is low          Passed - Medication is active on med list        Passed - Patient is age 18 or older        Passed - Recent (6 mo) or future (30 days) visit within the authorizing provider's specialty     Patient had office visit in the last 6 months or has a visit in the next 30 days with authorizing provider or within the authorizing provider's specialty.  See \"Patient Info\" tab in inbasket, or \"Choose Columns\" in Meds & Orders section of the refill encounter.                     "

## 2023-06-13 RX ORDER — INSULIN GLARGINE 100 [IU]/ML
INJECTION, SOLUTION SUBCUTANEOUS
Qty: 15 ML | Refills: 0 | Status: SHIPPED | OUTPATIENT
Start: 2023-06-13 | End: 2023-08-14

## 2023-06-13 RX ORDER — SERTRALINE HYDROCHLORIDE 100 MG/1
100 TABLET, FILM COATED ORAL DAILY
Qty: 90 TABLET | Refills: 0 | Status: SHIPPED | OUTPATIENT
Start: 2023-06-13 | End: 2023-10-30

## 2023-07-14 ENCOUNTER — LAB (OUTPATIENT)
Dept: LAB | Facility: CLINIC | Age: 63
End: 2023-07-14
Payer: COMMERCIAL

## 2023-07-14 DIAGNOSIS — E11.9 TYPE 2 DIABETES MELLITUS WITHOUT COMPLICATION, WITHOUT LONG-TERM CURRENT USE OF INSULIN (H): ICD-10-CM

## 2023-07-14 LAB — HBA1C MFR BLD: 10.7 % (ref 0–5.6)

## 2023-07-14 PROCEDURE — 83036 HEMOGLOBIN GLYCOSYLATED A1C: CPT

## 2023-07-14 PROCEDURE — 36415 COLL VENOUS BLD VENIPUNCTURE: CPT

## 2023-07-15 DIAGNOSIS — F41.9 ANXIETY: ICD-10-CM

## 2023-07-18 RX ORDER — SERTRALINE HYDROCHLORIDE 100 MG/1
100 TABLET, FILM COATED ORAL DAILY
Qty: 90 TABLET | Refills: 0 | OUTPATIENT
Start: 2023-07-18

## 2023-07-18 NOTE — TELEPHONE ENCOUNTER
Was sent on 6/13/23 with refills, should not be out at this time, please check profile for any held scripts.

## 2023-07-20 DIAGNOSIS — F41.9 ANXIETY: ICD-10-CM

## 2023-07-21 RX ORDER — SERTRALINE HYDROCHLORIDE 100 MG/1
100 TABLET, FILM COATED ORAL DAILY
Qty: 90 TABLET | Refills: 0 | OUTPATIENT
Start: 2023-07-21

## 2023-07-21 NOTE — TELEPHONE ENCOUNTER
Zoloft denied, refill too soon.  Sent to this pharmacy on 6/13/2023 for 90 tablets.   [FreeTextEntry1] : The patient had an elevated PSA.  Noted on routine exam.  He has had 5.46.  He has had repeat levels in this range. \par \par No abnormal rectal exam prior.  He has been voiding well. no change in weight or appetite.

## 2023-08-08 ENCOUNTER — MYC REFILL (OUTPATIENT)
Dept: FAMILY MEDICINE | Facility: OTHER | Age: 63
End: 2023-08-08
Payer: COMMERCIAL

## 2023-08-08 DIAGNOSIS — F41.9 ANXIETY: ICD-10-CM

## 2023-08-08 RX ORDER — HYDROXYZINE PAMOATE 25 MG/1
25 CAPSULE ORAL 3 TIMES DAILY PRN
Qty: 20 CAPSULE | Refills: 0 | Status: SHIPPED | OUTPATIENT
Start: 2023-08-08 | End: 2023-08-22

## 2023-08-11 DIAGNOSIS — L71.9 ROSACEA: ICD-10-CM

## 2023-08-11 RX ORDER — MINOCYCLINE HYDROCHLORIDE 100 MG/1
100 CAPSULE ORAL 2 TIMES DAILY
Qty: 180 CAPSULE | Refills: 3 | OUTPATIENT
Start: 2023-08-11

## 2023-08-12 DIAGNOSIS — E08.9 DIABETES MELLITUS DUE TO UNDERLYING CONDITION WITHOUT COMPLICATION, UNSPECIFIED WHETHER LONG TERM INSULIN USE (H): ICD-10-CM

## 2023-08-14 RX ORDER — INSULIN GLARGINE 100 [IU]/ML
INJECTION, SOLUTION SUBCUTANEOUS
Qty: 15 ML | Refills: 0 | Status: SHIPPED | OUTPATIENT
Start: 2023-08-14 | End: 2023-08-22

## 2023-08-22 ENCOUNTER — OFFICE VISIT (OUTPATIENT)
Dept: FAMILY MEDICINE | Facility: OTHER | Age: 63
End: 2023-08-22
Payer: COMMERCIAL

## 2023-08-22 VITALS
RESPIRATION RATE: 18 BRPM | SYSTOLIC BLOOD PRESSURE: 128 MMHG | HEART RATE: 74 BPM | HEIGHT: 70 IN | TEMPERATURE: 98.1 F | OXYGEN SATURATION: 94 % | DIASTOLIC BLOOD PRESSURE: 82 MMHG | WEIGHT: 218.5 LBS | BODY MASS INDEX: 31.28 KG/M2

## 2023-08-22 DIAGNOSIS — E11.65 TYPE 2 DIABETES MELLITUS WITH HYPERGLYCEMIA, WITH LONG-TERM CURRENT USE OF INSULIN (H): Primary | ICD-10-CM

## 2023-08-22 DIAGNOSIS — Z79.4 TYPE 2 DIABETES MELLITUS WITH HYPERGLYCEMIA, WITH LONG-TERM CURRENT USE OF INSULIN (H): Primary | ICD-10-CM

## 2023-08-22 DIAGNOSIS — F41.9 ANXIETY: ICD-10-CM

## 2023-08-22 DIAGNOSIS — L71.9 ROSACEA: ICD-10-CM

## 2023-08-22 LAB
CHOLEST SERPL-MCNC: 150 MG/DL
CREAT UR-MCNC: 177.2 MG/DL
HDLC SERPL-MCNC: 36 MG/DL
LDLC SERPL CALC-MCNC: 93 MG/DL
MICROALBUMIN UR-MCNC: <12 MG/L
MICROALBUMIN/CREAT UR: NORMAL MG/G{CREAT}
NONHDLC SERPL-MCNC: 114 MG/DL
TRIGL SERPL-MCNC: 105 MG/DL

## 2023-08-22 PROCEDURE — 82570 ASSAY OF URINE CREATININE: CPT | Performed by: PHYSICIAN ASSISTANT

## 2023-08-22 PROCEDURE — 99214 OFFICE O/P EST MOD 30 MIN: CPT | Performed by: PHYSICIAN ASSISTANT

## 2023-08-22 PROCEDURE — 82043 UR ALBUMIN QUANTITATIVE: CPT | Performed by: PHYSICIAN ASSISTANT

## 2023-08-22 PROCEDURE — 80061 LIPID PANEL: CPT | Performed by: PHYSICIAN ASSISTANT

## 2023-08-22 PROCEDURE — 36415 COLL VENOUS BLD VENIPUNCTURE: CPT | Performed by: PHYSICIAN ASSISTANT

## 2023-08-22 RX ORDER — INSULIN GLARGINE 100 [IU]/ML
INJECTION, SOLUTION SUBCUTANEOUS
Qty: 15 ML | Refills: 0 | COMMUNITY
Start: 2023-08-22 | End: 2023-10-17

## 2023-08-22 RX ORDER — MINOCYCLINE HYDROCHLORIDE 100 MG/1
100 CAPSULE ORAL 2 TIMES DAILY
Qty: 180 CAPSULE | Refills: 3 | Status: SHIPPED | OUTPATIENT
Start: 2023-08-22 | End: 2024-09-13

## 2023-08-22 RX ORDER — PROCHLORPERAZINE 25 MG/1
SUPPOSITORY RECTAL
Qty: 1 EACH | Refills: 0 | Status: SHIPPED | OUTPATIENT
Start: 2023-08-22 | End: 2023-12-28

## 2023-08-22 RX ORDER — PROCHLORPERAZINE 25 MG/1
SUPPOSITORY RECTAL
Qty: 1 EACH | Refills: 1 | Status: SHIPPED | OUTPATIENT
Start: 2023-08-22 | End: 2023-12-28

## 2023-08-22 RX ORDER — HYDROXYZINE PAMOATE 25 MG/1
25 CAPSULE ORAL 3 TIMES DAILY PRN
Qty: 20 CAPSULE | Refills: 0 | Status: SHIPPED | OUTPATIENT
Start: 2023-08-22 | End: 2023-10-30

## 2023-08-22 RX ORDER — PROCHLORPERAZINE 25 MG/1
1 SUPPOSITORY RECTAL
Qty: 3 EACH | Refills: 5 | Status: SHIPPED | OUTPATIENT
Start: 2023-08-22 | End: 2023-12-28

## 2023-08-22 ASSESSMENT — PATIENT HEALTH QUESTIONNAIRE - PHQ9
SUM OF ALL RESPONSES TO PHQ QUESTIONS 1-9: 11
10. IF YOU CHECKED OFF ANY PROBLEMS, HOW DIFFICULT HAVE THESE PROBLEMS MADE IT FOR YOU TO DO YOUR WORK, TAKE CARE OF THINGS AT HOME, OR GET ALONG WITH OTHER PEOPLE: SOMEWHAT DIFFICULT
SUM OF ALL RESPONSES TO PHQ QUESTIONS 1-9: 11

## 2023-08-22 NOTE — PROGRESS NOTES
Assessment & Plan     Type 2 diabetes mellitus without complication, with long-term current use of insulin (H)  Patient recently had A1c checked, returned at 10.7%. This is up from 7.6% nine months ago. Discussed with the patient that this requires medication adjustment or additional treatment to lower the sugars. He and his wife were reluctant to make any such changes and I emphasized the risks of allowing elevated sugars such as this to persist. The patient admits that he has been eating donuts, breads, etc and is aware that he cannot tolerate such a high carb/sugar diet. In addition, his wife states that he has not been exercising nor checking sugars regularly. Had checked on a vacation this summer, >250. This is consistent with the A1c. I will have him begin to titrate up his insulin 1-2 units every 2-3 days while monitoring sugars. I have also placed order for CGM and MTM for close follow up. He is aware and agreeable to these orders. Follow up in 2-3 months.   - Lipid panel reflex to direct LDL Non-fasting; Future  - Albumin Random Urine Quantitative with Creat Ratio; Future  - Med Therapy Management Referral  - Continuous Blood Gluc  (DEXCOM G6 ) YASMEEN; Use to read blood sugars as per 's instructions.  - Continuous Blood Gluc Sensor (DEXCOM G6 SENSOR) MISC; 1 each every 10 days Change every 10 days.  - Continuous Blood Gluc Transmit (DEXCOM G6 TRANSMITTER) MISC; Change every 3 months.  - Lipid panel reflex to direct LDL Non-fasting  - Albumin Random Urine Quantitative with Creat Ratio    Rosacea  Patient's rosacea had been well controlled until this summer. He says that the rash has worsened with the heat/sun exposure. He would like to restart the minocycline. I am in support of this. Pending response, can transition him to topical flagyl for maintenance.   - minocycline (MINOCIN) 100 MG capsule; Take 1 capsule (100 mg) by mouth 2 times daily    Anxiety  Patient has used this as  "needed. No new concerns at this time.   - hydrOXYzine (VISTARIL) 25 MG capsule; Take 1 capsule (25 mg) by mouth 3 times daily as needed for anxiety     Depression Screening Follow Up        8/22/2023     7:57 AM   PHQ   PHQ-9 Total Score 11   Q9: Thoughts of better off dead/self-harm past 2 weeks Not at all     ELIE العلي [facility] RYLIE Chen is a 62 year old, presenting for the following health issues:  Diabetes      8/22/2023     8:05 AM   Additional Questions   Roomed by Jenna WANG   Accompanied by spouse-Shaye       History of Present Illness       Diabetes:   He presents for follow up of diabetes.    He is not checking blood glucose.        He is concerned about blood sugar frequently over 200.    He is not experiencing numbness or burning in feet, excessive thirst, blurry vision, weight changes or redness, sores or blisters on feet. The patient has not had a diabetic eye exam in the last 12 months.          Reason for visit:  Face    He eats 0-1 servings of fruits and vegetables daily.He consumes 0 sweetened beverage(s) daily.He exercises with enough effort to increase his heart rate 9 or less minutes per day.  He exercises with enough effort to increase his heart rate 3 or less days per week.   He is taking medications regularly.     Rosacea follow up is what he meant by reason for visit:face, just PEPE. He is just asking for a refill of the medication that helps treat that. Also I let him know that he is due for his diabetic eye exam so he will be scheduling that and he will make sure we receive a copy of it.    Review of Systems   Constitutional, HEENT, cardiovascular, pulmonary, gi and gu systems are negative, except as otherwise noted.      Objective    /82   Pulse 74   Temp 98.1  F (36.7  C) (Temporal)   Resp 18   Ht 1.778 m (5' 10\")   Wt 99.1 kg (218 lb 8 oz)   SpO2 94%   BMI 31.35 kg/m    Body mass index is 31.35 kg/m .  Physical Exam   GENERAL: " healthy, alert and no distress  RESP: lungs clear to auscultation - no rales, rhonchi or wheezes  CV: regular rate and rhythm, normal S1 S2, no S3 or S4, no murmur, click or rub, no peripheral edema and peripheral pulses strong  MS: no gross musculoskeletal defects noted, no edema  PSYCH: mentation appears normal, affect normal/bright    Lab on 07/14/2023   Component Date Value Ref Range Status    Hemoglobin A1C 07/14/2023 10.7 (H)  0.0 - 5.6 % Final     No results found for any visits on 08/22/23.

## 2023-08-22 NOTE — PATIENT INSTRUCTIONS
Administer 25 units at bedtime subcutaneously (may increase 1-2 units every 2-3 days for max of 30 units)

## 2023-08-31 ENCOUNTER — TELEPHONE (OUTPATIENT)
Dept: FAMILY MEDICINE | Facility: OTHER | Age: 63
End: 2023-08-31
Payer: COMMERCIAL

## 2023-08-31 NOTE — TELEPHONE ENCOUNTER
[place] referral from: [facility] visit (referral by provider)    [place] referral outreach attempt #2 on August 31, 2023 at 10:58 AM      Outcome: Patient not reachable after several attempts, will route to [facility] Pharmacist/Provider as an FYI.  [facility] scheduling number is [phone].  Thank you for the referral.    Use   [employer]BS OOS-GFE needed    for the carrier/Plan on the flowsheet    MILAN Lara

## 2023-10-15 DIAGNOSIS — Z79.4 TYPE 2 DIABETES MELLITUS WITH HYPERGLYCEMIA, WITH LONG-TERM CURRENT USE OF INSULIN (H): Primary | ICD-10-CM

## 2023-10-15 DIAGNOSIS — E11.65 TYPE 2 DIABETES MELLITUS WITH HYPERGLYCEMIA, WITH LONG-TERM CURRENT USE OF INSULIN (H): Primary | ICD-10-CM

## 2023-10-17 RX ORDER — INSULIN GLARGINE 100 [IU]/ML
22 INJECTION, SOLUTION SUBCUTANEOUS AT BEDTIME
Qty: 7 ML | Refills: 0 | Status: SHIPPED | OUTPATIENT
Start: 2023-10-17 | End: 2023-11-29

## 2023-10-29 DIAGNOSIS — F41.9 ANXIETY: ICD-10-CM

## 2023-10-30 RX ORDER — SERTRALINE HYDROCHLORIDE 100 MG/1
100 TABLET, FILM COATED ORAL DAILY
Qty: 90 TABLET | Refills: 0 | Status: SHIPPED | OUTPATIENT
Start: 2023-10-30 | End: 2024-03-04

## 2023-10-30 RX ORDER — HYDROXYZINE PAMOATE 25 MG/1
25 CAPSULE ORAL 3 TIMES DAILY PRN
Qty: 20 CAPSULE | Refills: 0 | Status: SHIPPED | OUTPATIENT
Start: 2023-10-30 | End: 2024-03-04

## 2023-11-15 ENCOUNTER — TRANSFERRED RECORDS (OUTPATIENT)
Dept: MULTI SPECIALTY CLINIC | Facility: CLINIC | Age: 63
End: 2023-11-15

## 2023-11-15 LAB — RETINOPATHY: NORMAL

## 2023-11-25 DIAGNOSIS — Z79.4 TYPE 2 DIABETES MELLITUS WITH HYPERGLYCEMIA, WITH LONG-TERM CURRENT USE OF INSULIN (H): ICD-10-CM

## 2023-11-25 DIAGNOSIS — E11.65 TYPE 2 DIABETES MELLITUS WITH HYPERGLYCEMIA, WITH LONG-TERM CURRENT USE OF INSULIN (H): ICD-10-CM

## 2023-11-28 RX ORDER — INSULIN GLARGINE 100 [IU]/ML
22 INJECTION, SOLUTION SUBCUTANEOUS AT BEDTIME
Qty: 15 ML | Refills: 0 | OUTPATIENT
Start: 2023-11-28

## 2023-11-28 NOTE — TELEPHONE ENCOUNTER
Patient is overdue for office visit. Cyndi period has been provided and patient did not schedule. Please help patient schedule appointment and I can fill medication to get them to that date.     Robert Vasques PA-C on 11/28/2023 at 3:54 PM

## 2023-11-28 NOTE — TELEPHONE ENCOUNTER
I have not seen patient for over a year in order most recent appointment was with JR. Arvin graham.  Kim Bolivar MD

## 2023-11-29 RX ORDER — INSULIN GLARGINE 100 [IU]/ML
22 INJECTION, SOLUTION SUBCUTANEOUS AT BEDTIME
Qty: 7 ML | Refills: 0 | Status: SHIPPED | OUTPATIENT
Start: 2023-11-29 | End: 2023-12-28

## 2023-12-05 DIAGNOSIS — Z29.9 PREVENTIVE MEASURE: ICD-10-CM

## 2023-12-05 RX ORDER — ASPIRIN 81 MG/1
TABLET, CHEWABLE ORAL
Qty: 90 TABLET | Refills: 0 | Status: SHIPPED | OUTPATIENT
Start: 2023-12-05 | End: 2024-03-12

## 2023-12-28 ENCOUNTER — OFFICE VISIT (OUTPATIENT)
Dept: FAMILY MEDICINE | Facility: OTHER | Age: 63
End: 2023-12-28
Payer: COMMERCIAL

## 2023-12-28 VITALS
BODY MASS INDEX: 31.57 KG/M2 | HEIGHT: 70 IN | WEIGHT: 220.5 LBS | RESPIRATION RATE: 20 BRPM | OXYGEN SATURATION: 92 % | HEART RATE: 75 BPM | SYSTOLIC BLOOD PRESSURE: 136 MMHG | DIASTOLIC BLOOD PRESSURE: 88 MMHG | TEMPERATURE: 98.3 F

## 2023-12-28 DIAGNOSIS — I10 ESSENTIAL HYPERTENSION: ICD-10-CM

## 2023-12-28 DIAGNOSIS — E11.9 TYPE 2 DIABETES MELLITUS WITHOUT COMPLICATION, WITHOUT LONG-TERM CURRENT USE OF INSULIN (H): Primary | ICD-10-CM

## 2023-12-28 DIAGNOSIS — Z12.11 SCREEN FOR COLON CANCER: ICD-10-CM

## 2023-12-28 LAB
ANION GAP SERPL CALCULATED.3IONS-SCNC: 8 MMOL/L (ref 7–15)
BUN SERPL-MCNC: 13.2 MG/DL (ref 8–23)
CALCIUM SERPL-MCNC: 9.1 MG/DL (ref 8.8–10.2)
CHLORIDE SERPL-SCNC: 105 MMOL/L (ref 98–107)
CREAT SERPL-MCNC: 0.96 MG/DL (ref 0.67–1.17)
DEPRECATED HCO3 PLAS-SCNC: 29 MMOL/L (ref 22–29)
EGFRCR SERPLBLD CKD-EPI 2021: 88 ML/MIN/1.73M2
GLUCOSE SERPL-MCNC: 199 MG/DL (ref 70–99)
HBA1C MFR BLD: 8.9 % (ref 0–5.6)
POTASSIUM SERPL-SCNC: 4.3 MMOL/L (ref 3.4–5.3)
SODIUM SERPL-SCNC: 142 MMOL/L (ref 135–145)

## 2023-12-28 PROCEDURE — 90471 IMMUNIZATION ADMIN: CPT | Performed by: PHYSICIAN ASSISTANT

## 2023-12-28 PROCEDURE — 90678 RSV VACC PREF BIVALENT IM: CPT | Performed by: PHYSICIAN ASSISTANT

## 2023-12-28 PROCEDURE — 36415 COLL VENOUS BLD VENIPUNCTURE: CPT | Performed by: PHYSICIAN ASSISTANT

## 2023-12-28 PROCEDURE — 99214 OFFICE O/P EST MOD 30 MIN: CPT | Mod: 25 | Performed by: PHYSICIAN ASSISTANT

## 2023-12-28 PROCEDURE — 90472 IMMUNIZATION ADMIN EACH ADD: CPT | Performed by: PHYSICIAN ASSISTANT

## 2023-12-28 PROCEDURE — 99207 PR FOOT EXAM NO CHARGE: CPT | Performed by: PHYSICIAN ASSISTANT

## 2023-12-28 PROCEDURE — 83036 HEMOGLOBIN GLYCOSYLATED A1C: CPT | Performed by: PHYSICIAN ASSISTANT

## 2023-12-28 PROCEDURE — 90715 TDAP VACCINE 7 YRS/> IM: CPT | Performed by: PHYSICIAN ASSISTANT

## 2023-12-28 PROCEDURE — 80048 BASIC METABOLIC PNL TOTAL CA: CPT | Performed by: PHYSICIAN ASSISTANT

## 2023-12-28 RX ORDER — PROCHLORPERAZINE 25 MG/1
1 SUPPOSITORY RECTAL
Qty: 3 EACH | Refills: 5 | Status: SHIPPED | OUTPATIENT
Start: 2023-12-28 | End: 2024-04-02

## 2023-12-28 RX ORDER — PROCHLORPERAZINE 25 MG/1
SUPPOSITORY RECTAL
Qty: 1 EACH | Refills: 1 | Status: SHIPPED | OUTPATIENT
Start: 2023-12-28 | End: 2024-04-02

## 2023-12-28 RX ORDER — PROCHLORPERAZINE 25 MG/1
SUPPOSITORY RECTAL
Qty: 1 EACH | Refills: 0 | Status: SHIPPED | OUTPATIENT
Start: 2023-12-28 | End: 2024-04-02

## 2023-12-28 RX ORDER — INSULIN GLARGINE 100 [IU]/ML
25 INJECTION, SOLUTION SUBCUTANEOUS AT BEDTIME
Qty: 8 ML | Refills: 2 | Status: SHIPPED | OUTPATIENT
Start: 2023-12-28 | End: 2024-06-20

## 2023-12-28 RX ORDER — METFORMIN HCL 500 MG
1000 TABLET, EXTENDED RELEASE 24 HR ORAL 2 TIMES DAILY WITH MEALS
Qty: 360 TABLET | Refills: 0 | Status: SHIPPED | OUTPATIENT
Start: 2023-12-28 | End: 2024-09-12

## 2023-12-28 ASSESSMENT — ANXIETY QUESTIONNAIRES
3. WORRYING TOO MUCH ABOUT DIFFERENT THINGS: SEVERAL DAYS
GAD7 TOTAL SCORE: 3
2. NOT BEING ABLE TO STOP OR CONTROL WORRYING: SEVERAL DAYS
IF YOU CHECKED OFF ANY PROBLEMS ON THIS QUESTIONNAIRE, HOW DIFFICULT HAVE THESE PROBLEMS MADE IT FOR YOU TO DO YOUR WORK, TAKE CARE OF THINGS AT HOME, OR GET ALONG WITH OTHER PEOPLE: NOT DIFFICULT AT ALL
4. TROUBLE RELAXING: NOT AT ALL
6. BECOMING EASILY ANNOYED OR IRRITABLE: NOT AT ALL
GAD7 TOTAL SCORE: 3
7. FEELING AFRAID AS IF SOMETHING AWFUL MIGHT HAPPEN: NOT AT ALL
5. BEING SO RESTLESS THAT IT IS HARD TO SIT STILL: NOT AT ALL
1. FEELING NERVOUS, ANXIOUS, OR ON EDGE: SEVERAL DAYS

## 2023-12-28 ASSESSMENT — PAIN SCALES - GENERAL: PAINLEVEL: NO PAIN (0)

## 2023-12-28 NOTE — PROGRESS NOTES
Prior to immunization administration, verified patients identity using patient s name and date of birth. Please see Immunization Activity for additional information.     Screening Questionnaire for Adult Immunization    Are you sick today?   No   Do you have allergies to medications, food, a vaccine component or latex?   No   Have you ever had a serious reaction after receiving a vaccination?   No   Do you have a long-term health problem with heart, lung, kidney, or metabolic disease (e.g., diabetes), asthma, a blood disorder, no spleen, complement component deficiency, a cochlear implant, or a spinal fluid leak?  Are you on long-term aspirin therapy?   No   Do you have cancer, leukemia, HIV/AIDS, or any other immune system problem?   No   Do you have a parent, brother, or sister with an immune system problem?   No   In the past 3 months, have you taken medications that affect  your immune system, such as prednisone, other steroids, or anticancer drugs; drugs for the treatment of rheumatoid arthritis, Crohn s disease, or psoriasis; or have you had radiation treatments?   No   Have you had a seizure, or a brain or other nervous system problem?   No   During the past year, have you received a transfusion of blood or blood    products, or been given immune (gamma) globulin or antiviral drug?   No   For women: Are you pregnant or is there a chance you could become       pregnant during the next month?   No   Have you received any vaccinations in the past 4 weeks?   No     Immunization questionnaire answers were all negative.      Patient instructed to remain in clinic for 15 minutes afterwards, and to report any adverse reactions.     Screening performed by Joseline Benjamin CMA on 12/28/2023 at 8:02 AM.

## 2023-12-28 NOTE — PROGRESS NOTES
"  Assessment & Plan     Type 2 diabetes mellitus without complication, without long-term current use of insulin (H)  Patient has been making efforts to improve his dietary choices, specifically eating less donuts and similar high sugar foods. He says that he would like to use a CGM which I am in support of. I have sent this to the pharmacy for him. A1c returned at 8.9%, which is improved from the 10.7% at last check in July 2023. I will have the patient increase his insulin dose to 25 units at bedtime, up from 22 units. Follow up in 3 months to recheck sugars.   - metFORMIN (GLUCOPHAGE XR) 500 MG 24 hr tablet; Take 2 tablets (1,000 mg) by mouth 2 times daily (with meals)  - Basic metabolic panel  (Ca, Cl, CO2, Creat, Gluc, K, Na, BUN); Future  - Hemoglobin A1c; Future  - FOOT EXAM  - Basic metabolic panel  (Ca, Cl, CO2, Creat, Gluc, K, Na, BUN)  - Hemoglobin A1c    Essential hypertension  BP is 136/88, this is acceptable at this time. Patient was encouraged to continue working on a healthy diet as well as including exercise 5 or more days each week in his routine.     Screen for colon cancer  Patient will be due for screen this year, he was agreeable to order today. He can schedule at his convenience. History of polyps on previous screens.   - Colonoscopy Screening  Referral; Future    Reviewed health maintenance and updated per the patient's preferences.      BMI:   Estimated body mass index is 31.57 kg/m  as calculated from the following:    Height as of this encounter: 1.78 m (5' 10.08\").    Weight as of this encounter: 100 kg (220 lb 8 oz).   Weight management plan: Discussed healthy diet and exercise guidelines    ELIE العلي [facility] RYLIE Chen is a 63 year old, presenting for the following health issues:  Diabetes      12/28/2023     7:24 AM   Additional Questions   Roomed by Pat   Accompanied by Self         12/28/2023     7:24 AM   Patient Reported " "Additional Medications   Patient reports taking the following new medications NA       History of Present Illness       Diabetes:   He presents for follow up of diabetes.  He is checking home blood glucose a few times a week.   He checks blood glucose before meals and at bedtime.  Blood glucose is sometimes over 200 and never under 70. He is aware of hypoglycemia symptoms including none.   He is concerned about blood sugar frequently over 200.   He is having weight gain.  The patient has had a diabetic eye exam in the last 12 months. Eye exam performed on 1115. Location of last eye exam [facility].        He eats 0-1 servings of fruits and vegetables daily.He exercises with enough effort to increase his heart rate 9 or less minutes per day.  He exercises with enough effort to increase his heart rate 3 or less days per week.   He is taking medications regularly.     Review of Systems   Constitutional, HEENT, cardiovascular, pulmonary, gi and gu systems are negative, except as otherwise noted.      Objective    /88   Pulse 75   Temp 98.3  F (36.8  C) (Temporal)   Resp 20   Ht 1.78 m (5' 10.08\")   Wt 100 kg (220 lb 8 oz)   SpO2 92%   BMI 31.57 kg/m    Body mass index is 31.57 kg/m .  Physical Exam   GENERAL: healthy, alert and no distress  EYES: Eyes grossly normal to inspection, PERRL and conjunctivae and sclerae normal  HENT: ear canals and TM's normal, nose and mouth without ulcers or lesions  NECK: no adenopathy, no asymmetry, masses, or scars and thyroid normal to palpation  RESP: lungs clear to auscultation - no rales, rhonchi or wheezes  CV: regular rate and rhythm, normal S1 S2, no S3 or S4, no murmur, click or rub, no peripheral edema and peripheral pulses strong  MS: no gross musculoskeletal defects noted, no edema  Diabetic foot exam: normal DP and PT pulses, no trophic changes or ulcerative lesions, normal sensory exam, and nail exam onychomycosis of the toenails    Results for orders placed or " performed in visit on 12/28/23   Hemoglobin A1c     Status: Abnormal   Result Value Ref Range    Hemoglobin A1C 8.9 (H) 0.0 - 5.6 %    Narrative    Results confirmed by repeat test.

## 2024-01-03 NOTE — COMMUNITY RESOURCES LIST (ENGLISH)
01/03/2024   [facility]  N/A  For questions about this resource list or additional care needs, please contact your primary care clinic or care manager.  Phone: [phone]   Email: N/A   Address: [address] [place] [id number]   Hours: N/A        Financial Stability       Utility payment assistance  1  [facility] Services Office - [facility] utility assistance Distance: 3.39 miles      In-Person   [address] [address]  Language: English  Hours: Mon - Fri 8:00 AM - 12:00 PM , Mon - Fri 1:00 PM - 4:00 PM  Fees: Free   Phone: (708) 180-3843 Email: [email or web address] Website: [email or web address]/[email or web address]-services-[email or web address]     2  [facility] - Emergency Assistance - Utility payment assistance Distance: 9.03 miles      In-Person   [address] [facility] [address] [place] [address]  Language: English  Hours: Mon - Fri 8:00 AM - 4:30 PM  Fees: Free   Phone: (860) 997-8932 Email: [email or web address]. Website: [email or web address]          Important Numbers & Websites       Emergency Services   911  City Services   311  Poison Control   (706) 990-3231  Suicide Prevention Lifeline   (589) 306-1506 (TALK)  Child Abuse Hotline   (957) 299-8071 (4-A-Child)  Sexual Assault Hotline   (785) 558-5612 (HOPE)  National Runaway Safeline   (386) 319-6540 (RUNAWAY)  All-Options Talkline   (597) 764-4717  Substance Abuse Referral   (642) 481-1012 (HELP)     Validate Note Data When Using Inventory: Yes

## 2024-02-19 ENCOUNTER — TELEPHONE (OUTPATIENT)
Dept: FAMILY MEDICINE | Facility: OTHER | Age: 64
End: 2024-02-19
Payer: COMMERCIAL

## 2024-02-19 NOTE — TELEPHONE ENCOUNTER
Patient Quality Outreach    Patient is due for the following:   Diabetes -  Diabetic Follow-Up Visit    Next Steps:   Schedule a office visit for Diabetes recheck appt by end of March.    Type of outreach:    Sent [employer] message.      Questions for provider review:    None           Jenna Bhandari, CMA

## 2024-03-02 DIAGNOSIS — F41.9 ANXIETY: ICD-10-CM

## 2024-03-03 DIAGNOSIS — E78.2 MIXED HYPERLIPIDEMIA: ICD-10-CM

## 2024-03-04 RX ORDER — HYDROXYZINE PAMOATE 25 MG/1
25 CAPSULE ORAL 3 TIMES DAILY PRN
Qty: 20 CAPSULE | Refills: 4 | Status: SHIPPED | OUTPATIENT
Start: 2024-03-04

## 2024-03-04 RX ORDER — SERTRALINE HYDROCHLORIDE 100 MG/1
100 TABLET, FILM COATED ORAL DAILY
Qty: 90 TABLET | Refills: 1 | Status: SHIPPED | OUTPATIENT
Start: 2024-03-04 | End: 2024-04-17

## 2024-03-04 RX ORDER — SIMVASTATIN 20 MG
20 TABLET ORAL AT BEDTIME
Qty: 30 TABLET | Refills: 0 | Status: SHIPPED | OUTPATIENT
Start: 2024-03-04 | End: 2024-05-06

## 2024-03-12 DIAGNOSIS — Z29.9 PREVENTIVE MEASURE: ICD-10-CM

## 2024-03-12 RX ORDER — ASPIRIN 81 MG/1
TABLET, CHEWABLE ORAL
Qty: 90 TABLET | Refills: 0 | Status: SHIPPED | OUTPATIENT
Start: 2024-03-12 | End: 2024-06-10

## 2024-04-02 ENCOUNTER — OFFICE VISIT (OUTPATIENT)
Dept: FAMILY MEDICINE | Facility: OTHER | Age: 64
End: 2024-04-02
Payer: COMMERCIAL

## 2024-04-02 VITALS
SYSTOLIC BLOOD PRESSURE: 128 MMHG | TEMPERATURE: 96.9 F | WEIGHT: 221 LBS | OXYGEN SATURATION: 93 % | RESPIRATION RATE: 18 BRPM | HEIGHT: 70 IN | BODY MASS INDEX: 31.64 KG/M2 | HEART RATE: 89 BPM | DIASTOLIC BLOOD PRESSURE: 88 MMHG

## 2024-04-02 DIAGNOSIS — E11.65 TYPE 2 DIABETES MELLITUS WITH HYPERGLYCEMIA, WITH LONG-TERM CURRENT USE OF INSULIN (H): Primary | ICD-10-CM

## 2024-04-02 DIAGNOSIS — N20.0 CALCULUS OF KIDNEY: ICD-10-CM

## 2024-04-02 DIAGNOSIS — Z79.4 TYPE 2 DIABETES MELLITUS WITH HYPERGLYCEMIA, WITH LONG-TERM CURRENT USE OF INSULIN (H): Primary | ICD-10-CM

## 2024-04-02 DIAGNOSIS — Z87.19 HISTORY OF CHRONIC PANCREATITIS: ICD-10-CM

## 2024-04-02 PROBLEM — E11.9 TYPE 2 DIABETES MELLITUS WITHOUT COMPLICATION, WITHOUT LONG-TERM CURRENT USE OF INSULIN (H): Status: RESOLVED | Noted: 2019-03-07 | Resolved: 2024-04-02

## 2024-04-02 LAB
ALBUMIN SERPL BCG-MCNC: 3.9 G/DL (ref 3.5–5.2)
ALBUMIN UR-MCNC: NEGATIVE MG/DL
ALP SERPL-CCNC: 134 U/L (ref 40–150)
ALT SERPL W P-5'-P-CCNC: 22 U/L (ref 0–70)
AMYLASE SERPL-CCNC: 22 U/L (ref 28–100)
ANION GAP SERPL CALCULATED.3IONS-SCNC: 13 MMOL/L (ref 7–15)
APPEARANCE UR: CLEAR
AST SERPL W P-5'-P-CCNC: 19 U/L (ref 0–45)
BILIRUB SERPL-MCNC: 0.5 MG/DL
BILIRUB UR QL STRIP: NEGATIVE
BUN SERPL-MCNC: 17.7 MG/DL (ref 8–23)
CALCIUM SERPL-MCNC: 9 MG/DL (ref 8.8–10.2)
CHLORIDE SERPL-SCNC: 104 MMOL/L (ref 98–107)
COLOR UR AUTO: YELLOW
CREAT SERPL-MCNC: 0.88 MG/DL (ref 0.67–1.17)
DEPRECATED HCO3 PLAS-SCNC: 23 MMOL/L (ref 22–29)
EGFRCR SERPLBLD CKD-EPI 2021: >90 ML/MIN/1.73M2
GLUCOSE SERPL-MCNC: 308 MG/DL (ref 70–99)
GLUCOSE UR STRIP-MCNC: >=1000 MG/DL
HBA1C MFR BLD: 10.9 % (ref 0–5.6)
HGB UR QL STRIP: NEGATIVE
KETONES UR STRIP-MCNC: NEGATIVE MG/DL
LEUKOCYTE ESTERASE UR QL STRIP: NEGATIVE
LIPASE SERPL-CCNC: 21 U/L (ref 13–60)
NITRATE UR QL: NEGATIVE
PH UR STRIP: 5.5 [PH] (ref 5–7)
POTASSIUM SERPL-SCNC: 4.4 MMOL/L (ref 3.4–5.3)
PROT SERPL-MCNC: 6.6 G/DL (ref 6.4–8.3)
SODIUM SERPL-SCNC: 140 MMOL/L (ref 135–145)
SP GR UR STRIP: 1.02 (ref 1–1.03)
UROBILINOGEN UR STRIP-ACNC: 0.2 E.U./DL

## 2024-04-02 PROCEDURE — 82150 ASSAY OF AMYLASE: CPT | Performed by: PHYSICIAN ASSISTANT

## 2024-04-02 PROCEDURE — 99214 OFFICE O/P EST MOD 30 MIN: CPT | Performed by: PHYSICIAN ASSISTANT

## 2024-04-02 PROCEDURE — 80053 COMPREHEN METABOLIC PANEL: CPT | Performed by: PHYSICIAN ASSISTANT

## 2024-04-02 PROCEDURE — 83036 HEMOGLOBIN GLYCOSYLATED A1C: CPT | Performed by: PHYSICIAN ASSISTANT

## 2024-04-02 PROCEDURE — 36415 COLL VENOUS BLD VENIPUNCTURE: CPT | Performed by: PHYSICIAN ASSISTANT

## 2024-04-02 PROCEDURE — 83690 ASSAY OF LIPASE: CPT | Performed by: PHYSICIAN ASSISTANT

## 2024-04-02 PROCEDURE — 81003 URINALYSIS AUTO W/O SCOPE: CPT | Performed by: PHYSICIAN ASSISTANT

## 2024-04-02 ASSESSMENT — PAIN SCALES - GENERAL: PAINLEVEL: NO PAIN (0)

## 2024-04-02 NOTE — PROGRESS NOTES
Assessment & Plan     Type 2 diabetes mellitus with hyperglycemia, with long-term current use of insulin (H)  Related labs pending.  He needs to follow-up based on results.  Do recommend that he establish a primary care provider to follow along with his diabetes.  - Hemoglobin A1c; Future  - Amylase; Future  - Lipase; Future  - Comprehensive metabolic panel (BMP + Alb, Alk Phos, ALT, AST, Total. Bili, TP); Future  - UA Macroscopic with reflex to Microscopic and Culture - Lab Collect; Future  - CT Abdomen Pelvis w Contrast; Future  - Adult Diabetes Education  Referral; Future  - Hemoglobin A1c  - Amylase  - Lipase  - Comprehensive metabolic panel (BMP + Alb, Alk Phos, ALT, AST, Total. Bili, TP)  - UA Macroscopic with reflex to Microscopic and Culture - Lab Collect    Calculus of kidney  Historically noted with no new concerns.  His left flank pain seems to have resolved.  We do note that back in 2002 he had some very small kidney stones at that point in time that were on the left.  Did offer him a CT scan to recheck this.  He may consider this in the future.  - Hemoglobin A1c; Future  - Amylase; Future  - Lipase; Future  - Comprehensive metabolic panel (BMP + Alb, Alk Phos, ALT, AST, Total. Bili, TP); Future  - UA Macroscopic with reflex to Microscopic and Culture - Lab Collect; Future  - CT Abdomen Pelvis w Contrast; Future  - Adult Diabetes Education  Referral; Future  - Hemoglobin A1c  - Amylase  - Lipase  - Comprehensive metabolic panel (BMP + Alb, Alk Phos, ALT, AST, Total. Bili, TP)  - UA Macroscopic with reflex to Microscopic and Culture - Lab Collect    History of chronic pancreatitis  Related labs pending.  May consider CT scan in the future to take a look at this further as well.  No true concerns for new onset today based on what we can tell.   - Hemoglobin A1c; Future  - Amylase; Future  - Lipase; Future  - Comprehensive metabolic panel (BMP + Alb, Alk Phos, ALT, AST, Total. Bili, TP);  Future  - UA Macroscopic with reflex to Microscopic and Culture - Lab Collect; Future  - CT Abdomen Pelvis w Contrast; Future  - Adult Diabetes Education  Referral; Future  - Hemoglobin A1c  - Amylase  - Lipase  - Comprehensive metabolic panel (BMP + Alb, Alk Phos, ALT, AST, Total. Bili, TP)  - UA Macroscopic with reflex to Microscopic and Culture - Lab Collect    Subjective   Gustavo is a 63 year old, presenting for the following health issues:  Other (Pancreas /)        4/2/2024    10:40 AM   Additional Questions   Roomed by Serenity PARRA   Accompanied by Self     History of Present Illness       Back Pain:  He presents for follow up of back pain. Patient's back pain is a new problem.    Original cause of back pain: not sure  First noticed back pain: in the last week  Patient feels back pain: dailyLocation of back pain:  Left middle of back  Description of back pain: dull ache  Back pain spreads: nowhere    Since patient first noticed back pain, pain is: gradually improving  Does back pain interfere with his job:  Not applicable  On a scale of 1-10 (10 being the worst), patient describes pain as:  5  What makes back pain worse: bending, certain positions and twisting   Acupuncture: not tried  Acetaminophen: helpful  Chiropractor:  Not tried  Cold: not tried  Heat: not tried  Massage: not tried  Muscle relaxants: not tried  NSAIDS: not tried  Opioids: not tried  Steroid Injection: not tried  Stretching: not tried  Surgery: not tried  TENS unit: not tried  Topical pain relievers: helpful  Other healthcare providers patient is seeing for back pain: None    Diabetes:   He presents for follow up of diabetes.  He is checking home blood glucose one time daily.   He checks blood glucose before meals and after meals.  Blood glucose is sometimes over 200 and never under 70. He is aware of hypoglycemia symptoms including shakiness.    He has no concerns regarding his diabetes at this time.  He is having excessive thirst.    "         He eats 2-3 servings of fruits and vegetables daily.He consumes 0 sweetened beverage(s) daily.He exercises with enough effort to increase his heart rate 9 or less minutes per day.  He exercises with enough effort to increase his heart rate 3 or less days per week. He is missing 1 dose(s) of medications per week.       Review of Systems  Constitutional, HEENT, cardiovascular, pulmonary, GI, , musculoskeletal, neuro, skin, endocrine and psych systems are negative, except as otherwise noted.      Objective    /88   Pulse 89   Temp 96.9  F (36.1  C) (Temporal)   Resp 18   Ht 1.778 m (5' 10\")   Wt 100.2 kg (221 lb)   SpO2 93%   BMI 31.71 kg/m    Body mass index is 31.71 kg/m .  Physical Exam   GENERAL: alert and no distress  NECK: no adenopathy, no asymmetry, masses, or scars  RESP: lungs clear to auscultation - no rales, rhonchi or wheezes  CV: regular rate and rhythm, normal S1 S2, no S3 or S4, no murmur, click or rub, no peripheral edema  ABDOMEN: soft, nontender, no hepatosplenomegaly, no masses and bowel sounds normal  MS: no gross musculoskeletal defects noted, no edema    Results for orders placed or performed in visit on 04/02/24 (from the past 24 hour(s))   Hemoglobin A1c   Result Value Ref Range    Hemoglobin A1C 10.9 (H) 0.0 - 5.6 %    Narrative    Results confirmed by repeat test.     UA Macroscopic with reflex to Microscopic and Culture - Lab Collect    Specimen: Urine, NOS   Result Value Ref Range    Color Urine Yellow Colorless, Straw, Light Yellow, Yellow    Appearance Urine Clear Clear    Glucose Urine >=1000 (A) Negative mg/dL    Bilirubin Urine Negative Negative    Ketones Urine Negative Negative mg/dL    Specific Gravity Urine 1.025 1.003 - 1.035    Blood Urine Negative Negative    pH Urine 5.5 5.0 - 7.0    Protein Albumin Urine Negative Negative mg/dL    Urobilinogen Urine 0.2 0.2, 1.0 E.U./dL    Nitrite Urine Negative Negative    Leukocyte Esterase Urine Negative Negative    " Narrative    Microscopic not indicated           Signed Electronically by: Marek Medina PA-C

## 2024-04-17 ENCOUNTER — TELEPHONE (OUTPATIENT)
Dept: FAMILY MEDICINE | Facility: OTHER | Age: 64
End: 2024-04-17

## 2024-04-17 ENCOUNTER — VIRTUAL VISIT (OUTPATIENT)
Dept: FAMILY MEDICINE | Facility: OTHER | Age: 64
End: 2024-04-17
Payer: COMMERCIAL

## 2024-04-17 DIAGNOSIS — F41.9 ANXIETY: ICD-10-CM

## 2024-04-17 PROCEDURE — 99441 PR PHYSICIAN TELEPHONE EVALUATION 5-10 MIN: CPT | Mod: 93 | Performed by: PHYSICIAN ASSISTANT

## 2024-04-17 RX ORDER — SERTRALINE HYDROCHLORIDE 100 MG/1
150 TABLET, FILM COATED ORAL DAILY
Qty: 135 TABLET | Refills: 1 | Status: SHIPPED | OUTPATIENT
Start: 2024-04-17

## 2024-04-17 ASSESSMENT — ANXIETY QUESTIONNAIRES
5. BEING SO RESTLESS THAT IT IS HARD TO SIT STILL: SEVERAL DAYS
8. IF YOU CHECKED OFF ANY PROBLEMS, HOW DIFFICULT HAVE THESE MADE IT FOR YOU TO DO YOUR WORK, TAKE CARE OF THINGS AT HOME, OR GET ALONG WITH OTHER PEOPLE?: VERY DIFFICULT
4. TROUBLE RELAXING: NOT AT ALL
7. FEELING AFRAID AS IF SOMETHING AWFUL MIGHT HAPPEN: NOT AT ALL
GAD7 TOTAL SCORE: 5
1. FEELING NERVOUS, ANXIOUS, OR ON EDGE: SEVERAL DAYS
6. BECOMING EASILY ANNOYED OR IRRITABLE: SEVERAL DAYS
IF YOU CHECKED OFF ANY PROBLEMS ON THIS QUESTIONNAIRE, HOW DIFFICULT HAVE THESE PROBLEMS MADE IT FOR YOU TO DO YOUR WORK, TAKE CARE OF THINGS AT HOME, OR GET ALONG WITH OTHER PEOPLE: VERY DIFFICULT
GAD7 TOTAL SCORE: 5
2. NOT BEING ABLE TO STOP OR CONTROL WORRYING: SEVERAL DAYS
GAD7 TOTAL SCORE: 5
3. WORRYING TOO MUCH ABOUT DIFFERENT THINGS: SEVERAL DAYS

## 2024-04-17 ASSESSMENT — PATIENT HEALTH QUESTIONNAIRE - PHQ9
SUM OF ALL RESPONSES TO PHQ QUESTIONS 1-9: 13
SUM OF ALL RESPONSES TO PHQ QUESTIONS 1-9: 13
10. IF YOU CHECKED OFF ANY PROBLEMS, HOW DIFFICULT HAVE THESE PROBLEMS MADE IT FOR YOU TO DO YOUR WORK, TAKE CARE OF THINGS AT HOME, OR GET ALONG WITH OTHER PEOPLE: VERY DIFFICULT

## 2024-04-17 NOTE — PROGRESS NOTES
"    Instructions Relayed to Patient by Virtual Roomer:     Patient is active on [employer]:   Relayed following to patient: \"It looks like you are active on [employer], are you able to join the visit this way? If not, do you need us to send you a link now or would you like your provider to send a link via text or email when they are ready to initiate the visit?\"    Reminded patient to ensure they were logged on to virtual visit by arrival time listed. Documented in appointment notes if patient had flexibility to initiate visit sooner than arrival time. If pediatric virtual visit, ensured pediatric patient along with parent/guardian will be present for video visit.     Patient offered the website [email or web address] and/or phone number to [employer] Help line: [phone]    Gustavo is a 63 year old who is being evaluated via a billable telephone visit.    What phone number would you like to be contacted at? [phone]   How would you like to obtain your AVS? [place]  Originating Location (pt. Location): Home    Distant Location (provider location):  Off-site    Assessment & Plan     Anxiety  Sounds like work is a very big stressor for him and unfortunately this stress isn't going to be resolved.   Recommended revisiting with counseling to see if they can help him with triggers.   Will increase his sertraline dose to 150 mg as he has tolerated this well.  Goal is to hopefully only be using this regularly and then continue with Hydroxyzine if needed.    - Adult Mental Health  Referral; Future  - sertraline (ZOLOFT) 100 MG tablet; Take 1.5 tablets (150 mg) by mouth daily    Follow-up if not improving over the next 4-6 weeks, sooner if worse or new concerns.     Options for treatment and follow-up care were reviewed with the patient and/or guardian. Patient and/or guardian engaged in the decision making process and verbalized understanding of the options discussed and agreed with the final " plan.      Yash Chen is a 63 year old, presenting for the following health issues:   Follow Up  - Hydroxyzine, was taking all in the morning when needing it instead of 3x throughout the day      4/17/2024    11:22 AM   Additional Questions   Roomed by Alber HERNANDEZ     History of Present Illness       Mental Health Follow-up:  Patient presents to follow-up on Depression & Anxiety.Patient's depression since last visit has been:  Bad  The patient is not having other symptoms associated with depression.  Patient's anxiety since last visit has been:  Bad  The patient is not having other symptoms associated with anxiety.  Any significant life events: No  Patient is feeling anxious or having panic attacks.  Patient has no concerns about alcohol or drug use.      - Recently had a bad anxiety attack, yesterday. Broke down and cried. First one he has had in a while. It lasted through the night, he was up over night, today it is a little bit better.   - frequency of more mild panic attacks is very minimal since Sertraline was started.   - He has stress at work, someone said something at work and that triggered the attack  - He did do some counseling in the past when he first started medication.   - Typically takes Hydroxyzine only as needed basis but he has been taking it now with this panic attack. He has been taking it once daily on regular basis.   - Still feels like the sertraline has been helpful.       Review of Systems  Constitutional, neuro, skin, and psych systems are negative, except as otherwise noted.      Objective           Vitals:  No vitals were obtained today due to virtual visit.    Physical Exam   General: Alert and no distress //Respiratory: No audible wheeze, cough, or shortness of breath // Psychiatric:  Appropriate affect, tone, and pace of words            Phone call duration:8:04 minutes  Signed Electronically by: Alcon Rodriguez PA-C

## 2024-04-17 NOTE — TELEPHONE ENCOUNTER
Pt wanting to discuss mental health and his ongoing anxiet. May want a mental health or counseling referral     RN assisted in scheduling a virtual visit.     Celine Mcintyre RN

## 2024-04-22 ENCOUNTER — TELEPHONE (OUTPATIENT)
Dept: FAMILY MEDICINE | Facility: OTHER | Age: 64
End: 2024-04-22
Payer: COMMERCIAL

## 2024-04-22 NOTE — TELEPHONE ENCOUNTER
Patient Quality Outreach    Patient is due for the following:   Diabetes -  A1C    Next Steps:   No follow up needed at this time. Was just seen and also supposed to be seeing Diabetic education     Type of outreach:    Chart review performed, no outreach needed.      Questions for provider review:    None           Jenna Bhandari, CMA

## 2024-05-05 DIAGNOSIS — I10 ESSENTIAL HYPERTENSION: ICD-10-CM

## 2024-05-05 DIAGNOSIS — E78.2 MIXED HYPERLIPIDEMIA: ICD-10-CM

## 2024-05-06 RX ORDER — SIMVASTATIN 20 MG
20 TABLET ORAL AT BEDTIME
Qty: 30 TABLET | Refills: 1 | Status: SHIPPED | OUTPATIENT
Start: 2024-05-06 | End: 2024-07-15

## 2024-05-07 RX ORDER — LISINOPRIL 40 MG/1
40 TABLET ORAL DAILY
Qty: 30 TABLET | Refills: 0 | Status: SHIPPED | OUTPATIENT
Start: 2024-05-07 | End: 2024-06-10

## 2024-05-30 ENCOUNTER — VIRTUAL VISIT (OUTPATIENT)
Dept: PSYCHOLOGY | Facility: CLINIC | Age: 64
End: 2024-05-30
Payer: COMMERCIAL

## 2024-05-30 DIAGNOSIS — F41.9 ANXIETY: ICD-10-CM

## 2024-05-30 PROCEDURE — 90791 PSYCH DIAGNOSTIC EVALUATION: CPT | Mod: 95

## 2024-05-30 ASSESSMENT — ANXIETY QUESTIONNAIRES
7. FEELING AFRAID AS IF SOMETHING AWFUL MIGHT HAPPEN: SEVERAL DAYS
IF YOU CHECKED OFF ANY PROBLEMS ON THIS QUESTIONNAIRE, HOW DIFFICULT HAVE THESE PROBLEMS MADE IT FOR YOU TO DO YOUR WORK, TAKE CARE OF THINGS AT HOME, OR GET ALONG WITH OTHER PEOPLE: VERY DIFFICULT
GAD7 TOTAL SCORE: 15
3. WORRYING TOO MUCH ABOUT DIFFERENT THINGS: MORE THAN HALF THE DAYS
4. TROUBLE RELAXING: NEARLY EVERY DAY
2. NOT BEING ABLE TO STOP OR CONTROL WORRYING: MORE THAN HALF THE DAYS
7. FEELING AFRAID AS IF SOMETHING AWFUL MIGHT HAPPEN: SEVERAL DAYS
2. NOT BEING ABLE TO STOP OR CONTROL WORRYING: MORE THAN HALF THE DAYS
7. FEELING AFRAID AS IF SOMETHING AWFUL MIGHT HAPPEN: SEVERAL DAYS
6. BECOMING EASILY ANNOYED OR IRRITABLE: MORE THAN HALF THE DAYS
7. FEELING AFRAID AS IF SOMETHING AWFUL MIGHT HAPPEN: SEVERAL DAYS
GAD7 TOTAL SCORE: 15
1. FEELING NERVOUS, ANXIOUS, OR ON EDGE: NEARLY EVERY DAY
3. WORRYING TOO MUCH ABOUT DIFFERENT THINGS: MORE THAN HALF THE DAYS
1. FEELING NERVOUS, ANXIOUS, OR ON EDGE: NEARLY EVERY DAY
IF YOU CHECKED OFF ANY PROBLEMS ON THIS QUESTIONNAIRE, HOW DIFFICULT HAVE THESE PROBLEMS MADE IT FOR YOU TO DO YOUR WORK, TAKE CARE OF THINGS AT HOME, OR GET ALONG WITH OTHER PEOPLE: VERY DIFFICULT
6. BECOMING EASILY ANNOYED OR IRRITABLE: MORE THAN HALF THE DAYS
GAD7 TOTAL SCORE: 15
5. BEING SO RESTLESS THAT IT IS HARD TO SIT STILL: MORE THAN HALF THE DAYS
4. TROUBLE RELAXING: NEARLY EVERY DAY
5. BEING SO RESTLESS THAT IT IS HARD TO SIT STILL: MORE THAN HALF THE DAYS
GAD7 TOTAL SCORE: 15
8. IF YOU CHECKED OFF ANY PROBLEMS, HOW DIFFICULT HAVE THESE MADE IT FOR YOU TO DO YOUR WORK, TAKE CARE OF THINGS AT HOME, OR GET ALONG WITH OTHER PEOPLE?: VERY DIFFICULT
8. IF YOU CHECKED OFF ANY PROBLEMS, HOW DIFFICULT HAVE THESE MADE IT FOR YOU TO DO YOUR WORK, TAKE CARE OF THINGS AT HOME, OR GET ALONG WITH OTHER PEOPLE?: VERY DIFFICULT

## 2024-05-30 ASSESSMENT — COLUMBIA-SUICIDE SEVERITY RATING SCALE - C-SSRS
ATTEMPT LIFETIME: NO
2. HAVE YOU ACTUALLY HAD ANY THOUGHTS OF KILLING YOURSELF?: NO
6. HAVE YOU EVER DONE ANYTHING, STARTED TO DO ANYTHING, OR PREPARED TO DO ANYTHING TO END YOUR LIFE?: NO
TOTAL  NUMBER OF ABORTED OR SELF INTERRUPTED ATTEMPTS LIFETIME: NO
TOTAL  NUMBER OF INTERRUPTED ATTEMPTS LIFETIME: NO
1. HAVE YOU WISHED YOU WERE DEAD OR WISHED YOU COULD GO TO SLEEP AND NOT WAKE UP?: NO

## 2024-05-30 ASSESSMENT — PATIENT HEALTH QUESTIONNAIRE - PHQ9
SUM OF ALL RESPONSES TO PHQ QUESTIONS 1-9: 11
SUM OF ALL RESPONSES TO PHQ QUESTIONS 1-9: 11
10. IF YOU CHECKED OFF ANY PROBLEMS, HOW DIFFICULT HAVE THESE PROBLEMS MADE IT FOR YOU TO DO YOUR WORK, TAKE CARE OF THINGS AT HOME, OR GET ALONG WITH OTHER PEOPLE: EXTREMELY DIFFICULT

## 2024-05-30 NOTE — PROGRESS NOTES
"[facility]       PATIENT'S NAME: Zackery Rogers  PREFERRED NAME: Gustavo  PRONOUNS:     he him his  MRN: [id number]  : 1960  ADDRESS:  [facility] [address]  ACCT. NUMBER:  [id number]  DATE OF SERVICE: 24  START TIME: 731  END TIME: 820  PREFERRED PHONE: [phone]  May we leave a program related message: Yes  EMERGENCY CONTACT: was obtained Wife Kelsey   [phone].  SERVICE MODALITY:  Video Visit:      Provider verified identity through the following two step process.  Patient provided:  Patient  and Patient address    Telemedicine Visit: The patient's condition can be safely assessed and treated via synchronous audio and visual telemedicine encounter.      Reason for Telemedicine Visit: Patient convenience (e.g. access to timely appointments / distance to available provider)    Originating Site (Patient Location): Patient's home    Distant Site (Provider Location): Provider Remote Setting- Home Office    Consent:  The patient/guardian has verbally consented to: the potential risks and benefits of telemedicine (video visit) versus in person care; bill my insurance or make self-payment for services provided; and responsibility for payment of non-covered services.     Patient would like the video invitation sent by:  My Chart    Mode of Communication:  Video Conference via Amwell    Distant Location (Provider):  Off-site    As the provider I attest to compliance with applicable laws and regulations related to telemedicine.    UNIVERSAL ADULT Mental Health DIAGNOSTIC ASSESSMENT    Identifying Information:  Patient is a 63 year old,   individual.  Patient was referred for an assessment by primary care clinic.  Patient attended the session alone.    Chief Complaint:   The reason for seeking services at this time is: \"mental heaiyj\".  The problem(s) began 22.    Patient has attempted to resolve these concerns in the past through Therapy and " medication .    Social/Family History:  Patient reported they grew up in [facility]  .  They were raised by biological parents  .  Parents were always together.  Patient reported that their childhood was good-some siblings were in college when he was still young .  Patient described their current relationships with family of origin as has 4 siblings older than himself, he has regular contact with them.     The patient describes their cultural background as .  Cultural influences and impact on patient's life structure, values, norms, and healthcare: reina.  Contextual influences on patient's health include: Contextual Factors: Individual Factors symptoms of anxiety, work stress .    These factors will be addressed in the Preliminary Treatment plan. Patient identified their preferred language to be English. Patient reported they does not need the assistance of an  or other support involved in therapy.     Patient reported had no significant delays in developmental tasks.   Patient's highest education level was associate degree / vocational certificate  .  Patient identified the following learning problems: none reported.  Modifications will not be used to assist communication in therapy.  Patient reports they are  able to understand written materials.    Patient reported the following relationship history NA.  Patient's current relationship status is  .   Patient identified their sexual orientation as heterosexual.  Patient reported having 2 child(elrinda). Patient identified spouse as part of their support system.  Patient identified the quality of these relationships as good,  .      Patient's current living/housing situation involves staying in own home/apartment.  The immediate members of family and household include raj martinez, [age] and wife and two sons and they report that housing is stable.    Patient is currently employed fulltime.  Patient reports their finances are obtained  through employment. Patient does not identify finances as a current stressor.      Patient reported that they have not been involved with the legal system.    . Patient does not report being under probation/ parole/ jurisdiction. They are not under any current court jurisdiction. .    Patient's Strengths and Limitations:  Patient identified the following strengths or resources that will help them succeed in treatment: family support. Things that may interfere with the patient's success in treatment include: none identified.     Assessments:  The following assessments were completed by patient for this visit:  PHQ9:       9/23/2022     7:10 AM 3/22/2023     7:38 AM 8/22/2023     7:57 AM 4/17/2024    11:33 AM 5/30/2024     6:56 AM   PHQ-9 SCORE   PHQ-9 Total Score MyChart  14 (Moderate depression) 11 (Moderate depression) 13 (Moderate depression) 11 (Moderate depression)   PHQ-9 Total Score 12 14 11 13 11     GAD7:       8/17/2022     9:02 AM 9/23/2022     7:10 AM 11/2/2022     8:14 AM 3/22/2023     7:43 AM 12/28/2023     7:10 AM 4/17/2024    11:31 AM 5/30/2024     7:23 AM   LINDA-7 SCORE   Total Score 7 (mild anxiety)  6 (mild anxiety) 12 (moderate anxiety) 3 (minimal anxiety) 5 (mild anxiety) 15 (severe anxiety)   Total Score 7 9 6 12 3 5 15    15     CAGE-AID:       5/30/2024     7:28 AM   CAGE-AID Total Score   Total Score 0   Total Score MyChart 0 (A total score of 2 or greater is considered clinically significant)     PROMIS 10-Global Health (all questions and answers displayed):       5/30/2024     7:27 AM   PROMIS 10   In general, would you say your health is: Good   In general, would you say your quality of life is: Very good   In general, how would you rate your physical health? Good   In general, how would you rate your mental health, including your mood and your ability to think? Poor   In general, how would you rate your satisfaction with your social activities and relationships? Fair   In general, please  rate how well you carry out your usual social activities and roles Good   To what extent are you able to carry out your everyday physical activities such as walking, climbing stairs, carrying groceries, or moving a chair? Completely   In the past 7 days, how often have you been bothered by emotional problems such as feeling anxious, depressed, or irritable? Often   In the past 7 days, how would you rate your fatigue on average? Moderate   In the past 7 days, how would you rate your pain on average, where 0 means no pain, and 10 means worst imaginable pain? 10   In general, would you say your health is: 3   In general, would you say your quality of life is: 4   In general, how would you rate your physical health? 3   In general, how would you rate your mental health, including your mood and your ability to think? 1   In general, how would you rate your satisfaction with your social activities and relationships? 2   In general, please rate how well you carry out your usual social activities and roles. (This includes activities at home, at work and in your community, and responsibilities as a parent, child, spouse, employee, friend, etc.) 3   To what extent are you able to carry out your everyday physical activities such as walking, climbing stairs, carrying groceries, or moving a chair? 5   In the past 7 days, how often have you been bothered by emotional problems such as feeling anxious, depressed, or irritable? 4   In the past 7 days, how would you rate your fatigue on average? 3   In the past 7 days, how would you rate your pain on average, where 0 means no pain, and 10 means worst imaginable pain? 10   Global Mental Health Score 9    9   Global Physical Health Score 12    12   PROMIS TOTAL - SUBSCORES 21    21     [place] Suicide Severity Rating Scale (Lifetime/Recent)      5/30/2024     7:00 AM   [place] Suicide Severity Rating (Lifetime/Recent)   Q1 Wish to be Dead (Lifetime) N   Q2 Non-Specific Active  Suicidal Thoughts (Lifetime) N   Actual Attempt (Lifetime) N   Has subject engaged in non-suicidal self-injurious behavior? (Lifetime) N   Interrupted Attempts (Lifetime) N   Aborted or Self-Interrupted Attempt (Lifetime) N   Preparatory Acts or Behavior (Lifetime) N   Calculated C-SSRS Risk Score (Lifetime/Recent) No Risk Indicated       Personal and Family Medical History:  Patient does not report a family history of mental health concerns.  Patient reports family history includes Alzheimer Disease in his mother; Cerebrovascular Disease in his father; Family History Negative in his brother, brother, brother, and sister; Myocardial Infarction in his mother..     Patient does report Mental Health Diagnosis and/or Treatment.  Patient Patient reported the following previous diagnoses which include(s): an Anxiety Disorder.  Patient reported symptoms began years ago.   Patient has received mental health services in the past:  therapy about a year ago-was [facility] .  Psychiatric Hospitalizations: None.  Patient denies a history of civil commitment.  Patient is not receiving other mental health services.  These include none.       Patient has had a physical exam to rule out medical causes for current symptoms.  Date of last physical exam was within the past year. Client was encouraged to follow up with PCP if symptoms were to develop. The patient has a [facility] Provider, who is named No Ref-Primary, Physician..  Patient reports the following current medical concerns: type 2 diabetes .  Patient denies any issues with pain..   There are not significant appetite / nutritional concerns / weight changes.   Patient does not report a history of head injury / trauma / cognitive impairment.      Patient reports current meds as:   Current Outpatient Medications   Medication Sig Dispense Refill    alcohol swab prep pads Use to swab area of injection/rosalba as directed. (Patient not taking: Reported on 4/17/2024) 100 each 3     aspirin (ASPIRIN LOW DOSE) 81 MG chewable tablet CHEW AND SWALLOW ONE TABLET BY MOUTH ONE TIME DAILY. 90 tablet 0    blood glucose (NO BRAND SPECIFIED) test strip Use to test blood sugar 1 times daily or as directed. To accompany: Blood Glucose Monitor Brands: per insurance. (Patient not taking: Reported on 4/17/2024) 100 strip 6    blood glucose calibration (NO BRAND SPECIFIED) solution To accompany: Blood Glucose Monitor Brands: per insurance. (Patient not taking: Reported on 4/17/2024) 1 each 1    blood glucose monitoring (NO BRAND SPECIFIED) meter device kit Use to test blood sugar 1 times daily or as directed. Preferred blood glucose meter OR supplies to accompany: Blood Glucose Monitor Brands: per insurance. (Patient not taking: Reported on 4/17/2024) 1 kit 0    hydrOXYzine cande (VISTARIL) 25 MG capsule Take 1 capsule (25 mg) by mouth 3 times daily as needed for anxiety 20 capsule 4    insulin glargine 100 UNIT/ML pen Inject 25 Units Subcutaneous at bedtime 8 mL 2    insulin pen needle (31G X 6 MM) 31G X 6 MM miscellaneous Use 1 pen needles daily or as directed. (Patient not taking: Reported on 4/17/2024) 100 each 3    lisinopril (ZESTRIL) 40 MG tablet Take 1 tablet (40 mg) by mouth daily - DUE FOR DIABETIC FOLLOW UP 30 tablet 0    metFORMIN (GLUCOPHAGE XR) 500 MG 24 hr tablet Take 2 tablets (1,000 mg) by mouth 2 times daily (with meals) 360 tablet 0    minocycline (MINOCIN) 100 MG capsule Take 1 capsule (100 mg) by mouth 2 times daily 180 capsule 3    ondansetron (ZOFRAN ODT) 4 MG ODT tab Take 1 tablet (4 mg) by mouth every 6 hours as needed for nausea 15 tablet 0    SENNA-docusate sodium (SENNA S) 8.6-50 MG tablet Take 1 tablet by mouth daily as needed 30 tablet 0    sertraline (ZOLOFT) 100 MG tablet Take 1.5 tablets (150 mg) by mouth daily 135 tablet 1    simvastatin (ZOCOR) 20 MG tablet Take 1 tablet (20 mg) by mouth at bedtime - DUE FOR DIABETIC FOLLOW UP 30 tablet 1    thin (NO BRAND SPECIFIED) lancets  Use with lanceting device. To accompany: Blood Glucose Monitor Brands: per insurance. (Patient not taking: Reported on 4/17/2024) 100 each 11     No current facility-administered medications for this visit.       Medication Adherence:  Patient reports taking.  taking prescribed medications as prescribed.    Patient Allergies:  No Known Allergies    Medical History:    Past Medical History:   Diagnosis Date    DISH (diffuse idiopathic skeletal hyperostosis)     History of acute pancreatitis 2015    Hypertension     Obesity     Pancreatic mass     Rosacea     Type 2 diabetes mellitus with other specified complication (H)     Type 2 diabetes mellitus without complication, without long-term current use of insulin (H) 03/07/2019         Current Mental Status Exam:   Appearance:  Appropriate    Eye Contact:  Good   Psychomotor:  Normal       Gait / station:  Not assessed  Attitude / Demeanor: Cooperative  Pleasant  Speech      Rate / Production: Normal/ Responsive      Volume:  Normal  volume      Language:  intact  Mood:   Normal  Affect:   Appropriate    Thought Content: Clear   Thought Process: Coherent       Associations: No loosening of associations  Insight:   Good   Judgment:  Intact   Orientation:  All  Attention/concentration: Good    Substance Use:   Patient did not report a family history of substance use concerns; see medical history section for details.  Patient has not received chemical dependency treatment in the past.  Patient has not ever been to detox.      Patient is not currently receiving any chemical dependency treatment.           Substance History of use Age of first use Date of last use     Pattern and duration of use (include amounts and frequency)   Alcohol used in the past   15 01/12/24 REPORTS SUBSTANCE USE: N/A   Cannabis   used in the past 15 05/20/23 REPORTS SUBSTANCE USE: N/A     Amphetamines   never used     REPORTS SUBSTANCE USE: N/A   Cocaine/crack    never used       REPORTS SUBSTANCE  USE: N/A   Hallucinogens never used         REPORTS SUBSTANCE USE: N/A   Inhalants never used         REPORTS SUBSTANCE USE: N/A   Heroin never used         REPORTS SUBSTANCE USE: N/A   Other Opiates never used     REPORTS SUBSTANCE USE: N/A   Benzodiazepine   never used     REPORTS SUBSTANCE USE: N/A   Barbiturates never used     REPORTS SUBSTANCE USE: N/A   Over the counter meds used in the past 9 01/10/23 REPORTS SUBSTANCE USE: N/A   Caffeine currently use 5   Reports 3-4 cans of soda per day.   Nicotine  never used     REPORTS SUBSTANCE USE: N/A   Other substances not listed above:  Identify:  never used     REPORTS SUBSTANCE USE: N/A     Patient reported the following problems as a result of their substance use: None    Substance Use: No symptoms    Based on the negative CAGE score and clinical interview there  are not indications of drug or alcohol abuse.    Significant Losses / Trauma / Abuse / Neglect Issues:   Patient did not  serve in the .  There are indications or report of significant loss, trauma, abuse or neglect issues related to: are no indications and client denies any losses, trauma, abuse, or neglect concerns.  Concerns for possible neglect are not present.     Safety Assessment:   Patient denies current homicidal ideation and behaviors.  Patient denies current self-injurious ideation and behaviors.    Patient denied risk behaviors associated with substance use.   Patient denies any high risk behaviors associated with mental health symptoms.  Patient reports the following current concerns for their personal safety: None.  Patient reports there are firearms in the house.     yes, they are secured. The firearms are secured in a locked space.    History of Safety Concerns:  Patient denied a history of homicidal ideation.     Patient denied a history of personal safety concerns.    Patient denied a history of assaultive behaviors.    Patient denied a history of sexual assault behaviors.      Patient denied a history of risk behaviors associated with substance use.  Patient denies any history of high risk behaviors associated with mental health symptoms.  Patient reports the following protective factors: dedication to family or friends; sense of personal control or determination    Risk Plan:  See Recommendations for Safety and Risk Management Plan    Review of Symptoms per patient report:   Depression: Change in sleep, Lack of interest, Excessive or inappropriate guilt, Difficulties concentrating, Feelings of hopelessness, Feelings of helplessness, Irritability, Feeling sad, down, or depressed, and Anger outbursts  Corry:  No Symptoms  Psychosis: No Symptoms  Anxiety: Excessive worry, Nervousness, Physical complaints, such as headaches, stomachaches, muscle tension, Poor concentration, Irritability, and Anger outbursts  Panic:  Client reports prolonged panic at work  Post Traumatic Stress Disorder:  No Symptoms   Eating Disorder: No Symptoms  ADD / ADHD:  No symptoms  Conduct Disorder: No symptoms  Autism Spectrum Disorder: No symptoms  Obsessive Compulsive Disorder: No Symptoms    Patient reports the following compulsive behaviors and treatment history:  None .      Diagnostic Criteria:   Generalized Anxiety Disorder  A. Excessive anxiety and worry about a number of events or activities (such as work or school performance).   B. The person finds it difficult to control the worry.  C. Select 3 or more symptoms (required for diagnosis). Only one item is required in children.   - Restlessness or feeling keyed up or on edge.    - Being easily fatigued.    - Difficulty concentrating or mind going blank.    - Irritability.    - Sleep disturbance (difficulty falling or staying asleep, or restless unsatisfying sleep).   D. The focus of the anxiety and worry is not confined to features of an Axis I disorder.  E. The anxiety, worry, or physical symptoms cause clinically significant distress or impairment in  social, occupational, or other important areas of functioning.   F. The disturbance is not due to the direct physiological effects of a substance (e.g., a drug of abuse, a medication) or a general medical condition (e.g., hyperthyroidism) and does not occur exclusively during a Mood Disorder, a Psychotic Disorder, or a Pervasive Developmental Disorder.    - The aformentioned symptoms began several year(s) ago and occurs 7 days per week and is experienced as severe.    Functional Status:  Patient reports the following functional impairments:  work / vocational responsibilities.     Nonprogrammatic care:  Patient is requesting basic services to address current mental health concerns.    Clinical Summary:  1. Psychosocial, Cultural and Contextual Factors: , employed, lives in own home with wife and two adult sons  .  2. Principal DSM5 Diagnoses  (Sustained by DSM5 Criteria Listed Above):   300.02 (F41.1) Generalized Anxiety Disorder.  3. Other Diagnoses that is relevant to services:   NA  4. Provisional Diagnosis:  NA  5. Prognosis: Expect Improvement and Relieve Acute Symptoms.  6. Likely consequences of symptoms if not treated: worsening anxiety.  7. Client strengths include:  employed, goal-focused, has a previous history of therapy, motivated, open to learning, open to suggestions / feedback, responsible parent, and support of family, friends and providers .     Recommendations:     1. Plan for Safety and Risk Management:   Safety and Risk: Recommended that patient call 911 or go to the local ED should there be a change in any of these risk factors..          Report to child / adult protection services was NA.     2. Patient's identified  no concerns with sexual orientation, gender identity, culture, ethnicity, or margareth .     3. Initial Treatment will focus on:    Anxiety - LINDA  Functional Impairment at: work.     4. Resources/Service Plan:    services are not indicated.   Modifications to assist  communication are not indicated.   Additional disability accommodations are not indicated.      5. Collaboration:   Collaboration / coordination of treatment will be initiated with the following  support professionals: primary care physician as clinically indicated.     6.  Referrals:   The following referral(s) will be initiated:  NA .       A Release of Information has been obtained for the following:  NA .     Clinical Substantiation/medical necessity for the above recommendations:  individual therapy is necessary in order to alleviate symptoms and restore functioning.    7. JERRY:    JERRY:  not identified    8. Records:   These were reviewed at time of assessment.   Information in this assessment was obtained from the medical record and  provided by patient who is a good historian.    Patient will have open access to their mental health medical record.    9.   Interactive Complexity: No      Provider Name/ Credentials:  Richelle Linton MS, [facility]  May 30, 2024

## 2024-06-02 ENCOUNTER — HEALTH MAINTENANCE LETTER (OUTPATIENT)
Age: 64
End: 2024-06-02

## 2024-06-06 ENCOUNTER — VIRTUAL VISIT (OUTPATIENT)
Dept: PSYCHOLOGY | Facility: CLINIC | Age: 64
End: 2024-06-06
Payer: COMMERCIAL

## 2024-06-06 DIAGNOSIS — F41.1 GAD (GENERALIZED ANXIETY DISORDER): Primary | ICD-10-CM

## 2024-06-06 PROCEDURE — 90834 PSYTX W PT 45 MINUTES: CPT | Mod: 95

## 2024-06-06 NOTE — PROGRESS NOTES
Individual Treatment Plan    Patient's Name: Zackery Rogers  YOB: 1960    Date of Creation: 06/06/2024  Date Treatment Plan Last Reviewed/Revised: 06/06/2024    DSM5 Diagnoses: 300.02 (F41.1) Generalized Anxiety Disorder  Psychosocial / Contextual Factors: , employed, lives at home with wife and two adult sons  PROMIS (reviewed every 90 days): 530/2024    Referral / Collaboration:  Referral to another professional/service is not indicated at this time.    Anticipated number of session for this episode of care: 9-12 sessions  Anticipation frequency of session: Weeklythen re-evaluate after one month  Anticipated Duration of each session: 38-52 minutes  Treatment plan will be reviewed in 90 days or when goals have been changed.       MeasurableTreatment Goal(s) related to diagnosis / functional impairment(s)  Goal 1: Patient will decrease anxiety    I will know I've met my goal when I stop having anxiety attacks in stressful situations.      Objective #A (Patient Action)    Patient will use at least 3 coping skills for anxiety management in the next 12 weeks.  Status: New - Date: 06/06/2024      Intervention(s)  Therapist will provide psychoeducation, behavioral activation, and cognitive restructuring.    Objective #B  Patient will use cognitive strategies identified in therapy to challenge anxious thoughts.  Status: New - Date: 06/06/2024      Intervention(s)  Therapist will provide psychoeducation, behavioral activation, and cognitive restructuring.    Objective #C  Patient will identify three distraction and diversion activities and use those activities to decrease level of anxiety.  Status: New - Date: 06/06/2024      Intervention(s)  Therapist will provide psychoeducation, behavioral activation, and cognitive restructuring.    Patient has reviewed and agreed to the above plan.      Richelle Linton [facility]  June 6, 2024      [facility]       PATIENT'S NAME: Zackery  "BRANNON Sue  PREFERRED NAME: Gustavo  PRONOUNS:     he him his  MRN: [id number]  : 1960  ADDRESS: [address] [facility] [address]  ACCT. NUMBER:  [id number]  DATE OF SERVICE: 24  START TIME: 731  END TIME: 820  PREFERRED PHONE: [phone]  May we leave a program related message: Yes  EMERGENCY CONTACT: was obtained Wife Kelsey   [phone].  SERVICE MODALITY:  Video Visit:      Provider verified identity through the following two step process.  Patient provided:  Patient  and Patient address    Telemedicine Visit: The patient's condition can be safely assessed and treated via synchronous audio and visual telemedicine encounter.      Reason for Telemedicine Visit: Patient convenience (e.g. access to timely appointments / distance to available provider)    Originating Site (Patient Location): Patient's home    Distant Site (Provider Location): Provider Remote Setting- Home Office    Consent:  The patient/guardian has verbally consented to: the potential risks and benefits of telemedicine (video visit) versus in person care; bill my insurance or make self-payment for services provided; and responsibility for payment of non-covered services.     Patient would like the video invitation sent by:  My Chart    Mode of Communication:  Video Conference via [facility]well    Distant Location (Provider):  Off-site    As the provider I attest to compliance with applicable laws and regulations related to telemedicine.    UNIVERSAL ADULT Mental Health DIAGNOSTIC ASSESSMENT    Identifying Information:  Patient is a 63 year old,   individual.  Patient was referred for an assessment by primary care clinic.  Patient attended the session alone.    Chief Complaint:   The reason for seeking services at this time is: \"mental heaiyj\".  The problem(s) began 22.    Patient has attempted to resolve these concerns in the past through Therapy and medication .    Social/Family History:  Patient reported they grew up " in [facility]  .  They were raised by biological parents  .  Parents were always together.  Patient reported that their childhood was good-some siblings were in college when he was still young .  Patient described their current relationships with family of origin as has 4 siblings older than himself, he has regular contact with them.     The patient describes their cultural background as .  Cultural influences and impact on patient's life structure, values, norms, and healthcare: deonl.  Contextual influences on patient's health include: Contextual Factors: Individual Factors symptoms of anxiety, work stress .    These factors will be addressed in the Preliminary Treatment plan. Patient identified their preferred language to be English. Patient reported they does not need the assistance of an  or other support involved in therapy.     Patient reported had no significant delays in developmental tasks.   Patient's highest education level was associate degree / vocational certificate  .  Patient identified the following learning problems: none reported.  Modifications will not be used to assist communication in therapy.  Patient reports they are  able to understand written materials.    Patient reported the following relationship history NA.  Patient's current relationship status is  .   Patient identified their sexual orientation as heterosexual.  Patient reported having 2 child(erlinda). Patient identified spouse as part of their support system.  Patient identified the quality of these relationships as good,  .      Patient's current living/housing situation involves staying in own home/apartment.  The immediate members of family and household include raj martinez, [age] and wife and two sons and they report that housing is stable.    Patient is currently employed fulltime.  Patient reports their finances are obtained through employment. Patient does not identify finances as a current  stressor.      Patient reported that they have not been involved with the legal system.    . Patient does not report being under probation/ parole/ jurisdiction. They are not under any current court jurisdiction. .    Patient's Strengths and Limitations:  Patient identified the following strengths or resources that will help them succeed in treatment: family support. Things that may interfere with the patient's success in treatment include: none identified.     Assessments:  The following assessments were completed by patient for this visit:  PHQ9:       9/23/2022     7:10 AM 3/22/2023     7:38 AM 8/22/2023     7:57 AM 4/17/2024    11:33 AM 5/30/2024     6:56 AM   PHQ-9 SCORE   PHQ-9 Total Score MyChart  14 (Moderate depression) 11 (Moderate depression) 13 (Moderate depression) 11 (Moderate depression)   PHQ-9 Total Score 12 14 11 13 11     GAD7:       8/17/2022     9:02 AM 9/23/2022     7:10 AM 11/2/2022     8:14 AM 3/22/2023     7:43 AM 12/28/2023     7:10 AM 4/17/2024    11:31 AM 5/30/2024     7:23 AM   LINDA-7 SCORE   Total Score 7 (mild anxiety)  6 (mild anxiety) 12 (moderate anxiety) 3 (minimal anxiety) 5 (mild anxiety) 15 (severe anxiety)   Total Score 7 9 6 12 3 5 15    15     CAGE-AID:       5/30/2024     7:28 AM   CAGE-AID Total Score   Total Score 0   Total Score MyChart 0 (A total score of 2 or greater is considered clinically significant)     PROMIS 10-Global Health (all questions and answers displayed):       5/30/2024     7:27 AM   PROMIS 10   In general, would you say your health is: Good   In general, would you say your quality of life is: Very good   In general, how would you rate your physical health? Good   In general, how would you rate your mental health, including your mood and your ability to think? Poor   In general, how would you rate your satisfaction with your social activities and relationships? Fair   In general, please rate how well you carry out your usual social activities and roles  Good   To what extent are you able to carry out your everyday physical activities such as walking, climbing stairs, carrying groceries, or moving a chair? Completely   In the past 7 days, how often have you been bothered by emotional problems such as feeling anxious, depressed, or irritable? Often   In the past 7 days, how would you rate your fatigue on average? Moderate   In the past 7 days, how would you rate your pain on average, where 0 means no pain, and 10 means worst imaginable pain? 10   In general, would you say your health is: 3   In general, would you say your quality of life is: 4   In general, how would you rate your physical health? 3   In general, how would you rate your mental health, including your mood and your ability to think? 1   In general, how would you rate your satisfaction with your social activities and relationships? 2   In general, please rate how well you carry out your usual social activities and roles. (This includes activities at home, at work and in your community, and responsibilities as a parent, child, spouse, employee, friend, etc.) 3   To what extent are you able to carry out your everyday physical activities such as walking, climbing stairs, carrying groceries, or moving a chair? 5   In the past 7 days, how often have you been bothered by emotional problems such as feeling anxious, depressed, or irritable? 4   In the past 7 days, how would you rate your fatigue on average? 3   In the past 7 days, how would you rate your pain on average, where 0 means no pain, and 10 means worst imaginable pain? 10   Global Mental Health Score 9    9   Global Physical Health Score 12    12   PROMIS TOTAL - SUBSCORES 21    21     [place] Suicide Severity Rating Scale (Lifetime/Recent)      5/30/2024     7:00 AM   [place] Suicide Severity Rating (Lifetime/Recent)   Q1 Wish to be Dead (Lifetime) N   Q2 Non-Specific Active Suicidal Thoughts (Lifetime) N   Actual Attempt (Lifetime) N   Has subject  engaged in non-suicidal self-injurious behavior? (Lifetime) N   Interrupted Attempts (Lifetime) N   Aborted or Self-Interrupted Attempt (Lifetime) N   Preparatory Acts or Behavior (Lifetime) N   Calculated C-SSRS Risk Score (Lifetime/Recent) No Risk Indicated       Personal and Family Medical History:  Patient does not report a family history of mental health concerns.  Patient reports family history includes Alzheimer Disease in his mother; Cerebrovascular Disease in his father; Family History Negative in his brother, brother, brother, and sister; Myocardial Infarction in his mother..     Patient does report Mental Health Diagnosis and/or Treatment.  Patient Patient reported the following previous diagnoses which include(s): an Anxiety Disorder.  Patient reported symptoms began years ago.   Patient has received mental health services in the past:  therapy about a year ago-was [facility] .  Psychiatric Hospitalizations: None.  Patient denies a history of civil commitment.  Patient is not receiving other mental health services.  These include none.       Patient has had a physical exam to rule out medical causes for current symptoms.  Date of last physical exam was within the past year. Client was encouraged to follow up with PCP if symptoms were to develop. The patient has a [facility] Provider, who is named No Ref-Primary, Physician..  Patient reports the following current medical concerns: type 2 diabetes .  Patient denies any issues with pain..   There are not significant appetite / nutritional concerns / weight changes.   Patient does not report a history of head injury / trauma / cognitive impairment.      Patient reports current meds as:   Current Outpatient Medications   Medication Sig Dispense Refill    alcohol swab prep pads Use to swab area of injection/rosalba as directed. (Patient not taking: Reported on 4/17/2024) 100 each 3    aspirin (ASPIRIN LOW DOSE) 81 MG chewable tablet CHEW AND SWALLOW ONE  TABLET BY MOUTH ONE TIME DAILY. 90 tablet 0    blood glucose (NO BRAND SPECIFIED) test strip Use to test blood sugar 1 times daily or as directed. To accompany: Blood Glucose Monitor Brands: per insurance. (Patient not taking: Reported on 4/17/2024) 100 strip 6    blood glucose calibration (NO BRAND SPECIFIED) solution To accompany: Blood Glucose Monitor Brands: per insurance. (Patient not taking: Reported on 4/17/2024) 1 each 1    blood glucose monitoring (NO BRAND SPECIFIED) meter device kit Use to test blood sugar 1 times daily or as directed. Preferred blood glucose meter OR supplies to accompany: Blood Glucose Monitor Brands: per insurance. (Patient not taking: Reported on 4/17/2024) 1 kit 0    hydrOXYzine cande (VISTARIL) 25 MG capsule Take 1 capsule (25 mg) by mouth 3 times daily as needed for anxiety 20 capsule 4    insulin glargine 100 UNIT/ML pen Inject 25 Units Subcutaneous at bedtime 8 mL 2    insulin pen needle (31G X 6 MM) 31G X 6 MM miscellaneous Use 1 pen needles daily or as directed. (Patient not taking: Reported on 4/17/2024) 100 each 3    lisinopril (ZESTRIL) 40 MG tablet Take 1 tablet (40 mg) by mouth daily - DUE FOR DIABETIC FOLLOW UP 30 tablet 0    metFORMIN (GLUCOPHAGE XR) 500 MG 24 hr tablet Take 2 tablets (1,000 mg) by mouth 2 times daily (with meals) 360 tablet 0    minocycline (MINOCIN) 100 MG capsule Take 1 capsule (100 mg) by mouth 2 times daily 180 capsule 3    ondansetron (ZOFRAN ODT) 4 MG ODT tab Take 1 tablet (4 mg) by mouth every 6 hours as needed for nausea 15 tablet 0    SENNA-docusate sodium (SENNA S) 8.6-50 MG tablet Take 1 tablet by mouth daily as needed 30 tablet 0    sertraline (ZOLOFT) 100 MG tablet Take 1.5 tablets (150 mg) by mouth daily 135 tablet 1    simvastatin (ZOCOR) 20 MG tablet Take 1 tablet (20 mg) by mouth at bedtime - DUE FOR DIABETIC FOLLOW UP 30 tablet 1    thin (NO BRAND SPECIFIED) lancets Use with lanceting device. To accompany: Blood Glucose Monitor Brands:  per insurance. (Patient not taking: Reported on 4/17/2024) 100 each 11     No current facility-administered medications for this visit.       Medication Adherence:  Patient reports taking.  taking prescribed medications as prescribed.    Patient Allergies:  No Known Allergies    Medical History:    Past Medical History:   Diagnosis Date    DISH (diffuse idiopathic skeletal hyperostosis)     History of acute pancreatitis 2015    Hypertension     Obesity     Pancreatic mass     Rosacea     Type 2 diabetes mellitus with other specified complication (H)     Type 2 diabetes mellitus without complication, without long-term current use of insulin (H) 03/07/2019         Current Mental Status Exam:   Appearance:  Appropriate    Eye Contact:  Good   Psychomotor:  Normal       Gait / station:  Not assessed  Attitude / Demeanor: Cooperative  Pleasant  Speech      Rate / Production: Normal/ Responsive      Volume:  Normal  volume      Language:  intact  Mood:   Normal  Affect:   Appropriate    Thought Content: Clear   Thought Process: Coherent       Associations: No loosening of associations  Insight:   Good   Judgment:  Intact   Orientation:  All  Attention/concentration: Good    Substance Use:   Patient did not report a family history of substance use concerns; see medical history section for details.  Patient has not received chemical dependency treatment in the past.  Patient has not ever been to detox.      Patient is not currently receiving any chemical dependency treatment.           Substance History of use Age of first use Date of last use     Pattern and duration of use (include amounts and frequency)   Alcohol used in the past   15 01/12/24 REPORTS SUBSTANCE USE: N/A   Cannabis   used in the past 15 05/20/23 REPORTS SUBSTANCE USE: N/A     Amphetamines   never used     REPORTS SUBSTANCE USE: N/A   Cocaine/crack    never used       REPORTS SUBSTANCE USE: N/A   Hallucinogens never used         REPORTS SUBSTANCE USE: N/A    Inhalants never used         REPORTS SUBSTANCE USE: N/A   Heroin never used         REPORTS SUBSTANCE USE: N/A   Other Opiates never used     REPORTS SUBSTANCE USE: N/A   Benzodiazepine   never used     REPORTS SUBSTANCE USE: N/A   Barbiturates never used     REPORTS SUBSTANCE USE: N/A   Over the counter meds used in the past 9 01/10/23 REPORTS SUBSTANCE USE: N/A   Caffeine currently use 5   Reports 3-4 cans of soda per day.   Nicotine  never used     REPORTS SUBSTANCE USE: N/A   Other substances not listed above:  Identify:  never used     REPORTS SUBSTANCE USE: N/A     Patient reported the following problems as a result of their substance use: None    Substance Use: No symptoms    Based on the negative CAGE score and clinical interview there  are not indications of drug or alcohol abuse.    Significant Losses / Trauma / Abuse / Neglect Issues:   Patient did not  serve in the .  There are indications or report of significant loss, trauma, abuse or neglect issues related to: are no indications and client denies any losses, trauma, abuse, or neglect concerns.  Concerns for possible neglect are not present.     Safety Assessment:   Patient denies current homicidal ideation and behaviors.  Patient denies current self-injurious ideation and behaviors.    Patient denied risk behaviors associated with substance use.   Patient denies any high risk behaviors associated with mental health symptoms.  Patient reports the following current concerns for their personal safety: None.  Patient reports there are firearms in the house.     yes, they are secured. The firearms are secured in a locked space.    History of Safety Concerns:  Patient denied a history of homicidal ideation.     Patient denied a history of personal safety concerns.    Patient denied a history of assaultive behaviors.    Patient denied a history of sexual assault behaviors.     Patient denied a history of risk behaviors associated with substance  use.  Patient denies any history of high risk behaviors associated with mental health symptoms.  Patient reports the following protective factors: dedication to family or friends; sense of personal control or determination    Risk Plan:  See Recommendations for Safety and Risk Management Plan    Review of Symptoms per patient report:   Depression: Change in sleep, Lack of interest, Excessive or inappropriate guilt, Difficulties concentrating, Feelings of hopelessness, Feelings of helplessness, Irritability, Feeling sad, down, or depressed, and Anger outbursts  Corry:  No Symptoms  Psychosis: No Symptoms  Anxiety: Excessive worry, Nervousness, Physical complaints, such as headaches, stomachaches, muscle tension, Poor concentration, Irritability, and Anger outbursts  Panic:  Client reports prolonged panic at work  Post Traumatic Stress Disorder:  No Symptoms   Eating Disorder: No Symptoms  ADD / ADHD:  No symptoms  Conduct Disorder: No symptoms  Autism Spectrum Disorder: No symptoms  Obsessive Compulsive Disorder: No Symptoms    Patient reports the following compulsive behaviors and treatment history:  None .      Diagnostic Criteria:   Generalized Anxiety Disorder  A. Excessive anxiety and worry about a number of events or activities (such as work or school performance).   B. The person finds it difficult to control the worry.  C. Select 3 or more symptoms (required for diagnosis). Only one item is required in children.   - Restlessness or feeling keyed up or on edge.    - Being easily fatigued.    - Difficulty concentrating or mind going blank.    - Irritability.    - Sleep disturbance (difficulty falling or staying asleep, or restless unsatisfying sleep).   D. The focus of the anxiety and worry is not confined to features of an Axis I disorder.  E. The anxiety, worry, or physical symptoms cause clinically significant distress or impairment in social, occupational, or other important areas of functioning.   F. The  disturbance is not due to the direct physiological effects of a substance (e.g., a drug of abuse, a medication) or a general medical condition (e.g., hyperthyroidism) and does not occur exclusively during a Mood Disorder, a Psychotic Disorder, or a Pervasive Developmental Disorder.    - The aformentioned symptoms began several year(s) ago and occurs 7 days per week and is experienced as severe.    Functional Status:  Patient reports the following functional impairments:  work / vocational responsibilities.     Nonprogrammatic care:  Patient is requesting basic services to address current mental health concerns.    Clinical Summary:  1. Psychosocial, Cultural and Contextual Factors: , employed, lives in own home with wife and two adult sons  .  2. Principal DSM5 Diagnoses  (Sustained by DSM5 Criteria Listed Above):   300.02 (F41.1) Generalized Anxiety Disorder.  3. Other Diagnoses that is relevant to services:   NA  4. Provisional Diagnosis:  NA  5. Prognosis: Expect Improvement and Relieve Acute Symptoms.  6. Likely consequences of symptoms if not treated: worsening anxiety.  7. Client strengths include:  employed, goal-focused, has a previous history of therapy, motivated, open to learning, open to suggestions / feedback, responsible parent, and support of family, friends and providers .     Recommendations:     1. Plan for Safety and Risk Management:   Safety and Risk: Recommended that patient call 911 or go to the local ED should there be a change in any of these risk factors..          Report to child / adult protection services was NA.     2. Patient's identified  no concerns with sexual orientation, gender identity, culture, ethnicity, or margareth .     3. Initial Treatment will focus on:    Anxiety - LINDA  Functional Impairment at: work.     4. Resources/Service Plan:    services are not indicated.   Modifications to assist communication are not indicated.   Additional disability accommodations  are not indicated.      5. Collaboration:   Collaboration / coordination of treatment will be initiated with the following  support professionals: primary care physician as clinically indicated.     6.  Referrals:   The following referral(s) will be initiated:  NA .       A Release of Information has been obtained for the following:  NA .     Clinical Substantiation/medical necessity for the above recommendations:  individual therapy is necessary in order to alleviate symptoms and restore functioning.    7. JERRY:    JERRY:  not identified    8. Records:   These were reviewed at time of assessment.   Information in this assessment was obtained from the medical record and  provided by patient who is a good historian.    Patient will have open access to their mental health medical record.    9.   Interactive Complexity: No      Provider Name/ Credentials:  Richelle Linton MS, [facility]  May 30, 2024

## 2024-06-09 DIAGNOSIS — Z29.9 PREVENTIVE MEASURE: ICD-10-CM

## 2024-06-09 DIAGNOSIS — I10 ESSENTIAL HYPERTENSION: ICD-10-CM

## 2024-06-10 RX ORDER — LISINOPRIL 40 MG/1
40 TABLET ORAL DAILY
Qty: 30 TABLET | Refills: 0 | Status: SHIPPED | OUTPATIENT
Start: 2024-06-10 | End: 2024-07-15

## 2024-06-10 RX ORDER — ASPIRIN 81 MG/1
TABLET, CHEWABLE ORAL
Qty: 90 TABLET | Refills: 0 | Status: SHIPPED | OUTPATIENT
Start: 2024-06-10 | End: 2024-09-13

## 2024-06-13 ENCOUNTER — VIRTUAL VISIT (OUTPATIENT)
Dept: PSYCHOLOGY | Facility: CLINIC | Age: 64
End: 2024-06-13
Payer: COMMERCIAL

## 2024-06-13 DIAGNOSIS — F41.1 GAD (GENERALIZED ANXIETY DISORDER): Primary | ICD-10-CM

## 2024-06-13 PROCEDURE — 90834 PSYTX W PT 45 MINUTES: CPT | Mod: 95

## 2024-06-13 ASSESSMENT — ANXIETY QUESTIONNAIRES
6. BECOMING EASILY ANNOYED OR IRRITABLE: MORE THAN HALF THE DAYS
2. NOT BEING ABLE TO STOP OR CONTROL WORRYING: NEARLY EVERY DAY
IF YOU CHECKED OFF ANY PROBLEMS ON THIS QUESTIONNAIRE, HOW DIFFICULT HAVE THESE PROBLEMS MADE IT FOR YOU TO DO YOUR WORK, TAKE CARE OF THINGS AT HOME, OR GET ALONG WITH OTHER PEOPLE: SOMEWHAT DIFFICULT
IF YOU CHECKED OFF ANY PROBLEMS ON THIS QUESTIONNAIRE, HOW DIFFICULT HAVE THESE PROBLEMS MADE IT FOR YOU TO DO YOUR WORK, TAKE CARE OF THINGS AT HOME, OR GET ALONG WITH OTHER PEOPLE: SOMEWHAT DIFFICULT
5. BEING SO RESTLESS THAT IT IS HARD TO SIT STILL: NOT AT ALL
6. BECOMING EASILY ANNOYED OR IRRITABLE: MORE THAN HALF THE DAYS
7. FEELING AFRAID AS IF SOMETHING AWFUL MIGHT HAPPEN: NOT AT ALL
GAD7 TOTAL SCORE: 11
3. WORRYING TOO MUCH ABOUT DIFFERENT THINGS: SEVERAL DAYS
7. FEELING AFRAID AS IF SOMETHING AWFUL MIGHT HAPPEN: NOT AT ALL
8. IF YOU CHECKED OFF ANY PROBLEMS, HOW DIFFICULT HAVE THESE MADE IT FOR YOU TO DO YOUR WORK, TAKE CARE OF THINGS AT HOME, OR GET ALONG WITH OTHER PEOPLE?: SOMEWHAT DIFFICULT
GAD7 TOTAL SCORE: 11
GAD7 TOTAL SCORE: 11
3. WORRYING TOO MUCH ABOUT DIFFERENT THINGS: SEVERAL DAYS
GAD7 TOTAL SCORE: 11
1. FEELING NERVOUS, ANXIOUS, OR ON EDGE: NEARLY EVERY DAY
4. TROUBLE RELAXING: MORE THAN HALF THE DAYS
2. NOT BEING ABLE TO STOP OR CONTROL WORRYING: NEARLY EVERY DAY
GAD7 TOTAL SCORE: 11
7. FEELING AFRAID AS IF SOMETHING AWFUL MIGHT HAPPEN: NOT AT ALL
1. FEELING NERVOUS, ANXIOUS, OR ON EDGE: NEARLY EVERY DAY
8. IF YOU CHECKED OFF ANY PROBLEMS, HOW DIFFICULT HAVE THESE MADE IT FOR YOU TO DO YOUR WORK, TAKE CARE OF THINGS AT HOME, OR GET ALONG WITH OTHER PEOPLE?: SOMEWHAT DIFFICULT
4. TROUBLE RELAXING: MORE THAN HALF THE DAYS
5. BEING SO RESTLESS THAT IT IS HARD TO SIT STILL: NOT AT ALL
GAD7 TOTAL SCORE: 11
7. FEELING AFRAID AS IF SOMETHING AWFUL MIGHT HAPPEN: NOT AT ALL

## 2024-06-13 NOTE — PROGRESS NOTES
[facility]                                     Progress Note    Patient Name: Zackeyr Rogers  Date: 06/13/2024         Service Type: Individual      Session Start Time: 933  Session End Time: 1022     Session Length: 49    Session #: 3    Attendees: Client attended alone    Service Modality:  Video Visit:      Provider verified identity through the following two step process.  Patient provided:  Patient is known previously to provider    Telemedicine Visit: The patient's condition can be safely assessed and treated via synchronous audio and visual telemedicine encounter.      Reason for Telemedicine Visit: Patient has requested telehealth visit    Originating Site (Patient Location): Patient's home    Distant Site (Provider Location): Provider Remote Setting- Home Office    Consent:  The patient/guardian has verbally consented to: the potential risks and benefits of telemedicine (video visit) versus in person care; bill my insurance or make self-payment for services provided; and responsibility for payment of non-covered services.     Patient would like the video invitation sent by:  My Chart    Mode of Communication:  Video Conference via Amwell    Distant Location (Provider):  Off-site    As the provider I attest to compliance with applicable laws and regulations related to telemedicine.    DATA  Interactive Complexity: No  Crisis: No        Progress Since Last Session (Related to Symptoms / Goals / Homework):   Symptoms: No change      Homework:  had not been assigned      Episode of Care Goals: Satisfactory progress - PREPARATION (Decided to change - considering how); Intervened by negotiating a change plan and determining options / strategies for behavior change, identifying triggers, exploring social supports, and working towards setting a date to begin behavior change     Current / Ongoing Stressors and Concerns:   Work stress, health concerns, panic attacks     Treatment  Objective(s) Addressed in This Session:   Learn and utilize coping strategies for anxiety       Intervention:   Discussed progressive muscle relaxation, practice in session.  Discussed importance of establishing care with PCP.  Goal in session, practice PMR, establish with PCP    Assessments completed prior to visit:  The following assessments were completed by patient for this visit:  PHQ9:       9/23/2022     7:10 AM 3/22/2023     7:38 AM 8/22/2023     7:57 AM 4/17/2024    11:33 AM 5/30/2024     6:56 AM   PHQ-9 SCORE   PHQ-9 Total Score MyChart  14 (Moderate depression) 11 (Moderate depression) 13 (Moderate depression) 11 (Moderate depression)   PHQ-9 Total Score 12 14 11 13 11     GAD7:       9/23/2022     7:10 AM 11/2/2022     8:14 AM 3/22/2023     7:43 AM 12/28/2023     7:10 AM 4/17/2024    11:31 AM 5/30/2024     7:23 AM 6/13/2024     9:20 AM   LINDA-7 SCORE   Total Score  6 (mild anxiety) 12 (moderate anxiety) 3 (minimal anxiety) 5 (mild anxiety) 15 (severe anxiety) 11 (moderate anxiety)   Total Score 9 6 12 3 5 15    15 11    11     PROMIS 10-Global Health (all questions and answers displayed):       5/30/2024     7:27 AM 6/13/2024     9:23 AM   PROMIS 10   In general, would you say your health is: Good Good   In general, would you say your quality of life is: Very good Poor   In general, how would you rate your physical health? Good Good   In general, how would you rate your mental health, including your mood and your ability to think? Poor Poor   In general, how would you rate your satisfaction with your social activities and relationships? Fair Poor   In general, please rate how well you carry out your usual social activities and roles Good Good   To what extent are you able to carry out your everyday physical activities such as walking, climbing stairs, carrying groceries, or moving a chair? Completely Mostly   In the past 7 days, how often have you been bothered by emotional problems such as feeling  anxious, depressed, or irritable? Often Often   In the past 7 days, how would you rate your fatigue on average? Moderate Moderate   In the past 7 days, how would you rate your pain on average, where 0 means no pain, and 10 means worst imaginable pain? 10 2   In general, would you say your health is: 3 3   In general, would you say your quality of life is: 4 1   In general, how would you rate your physical health? 3 3   In general, how would you rate your mental health, including your mood and your ability to think? 1 1   In general, how would you rate your satisfaction with your social activities and relationships? 2 1   In general, please rate how well you carry out your usual social activities and roles. (This includes activities at home, at work and in your community, and responsibilities as a parent, child, spouse, employee, friend, etc.) 3 3   To what extent are you able to carry out your everyday physical activities such as walking, climbing stairs, carrying groceries, or moving a chair? 5 4   In the past 7 days, how often have you been bothered by emotional problems such as feeling anxious, depressed, or irritable? 4 4   In the past 7 days, how would you rate your fatigue on average? 3 3   In the past 7 days, how would you rate your pain on average, where 0 means no pain, and 10 means worst imaginable pain? 10 2   Global Mental Health Score 9    9 5    5   Global Physical Health Score 12    12 14    14   PROMIS TOTAL - SUBSCORES 21    21 19    19         ASSESSMENT: Current Emotional / Mental Status (status of significant symptoms):   Risk status (Self / Other harm or suicidal ideation)   Patient denies current fears or concerns for personal safety.   Patient denies current or recent suicidal ideation or behaviors.   Patient denies current or recent homicidal ideation or behaviors.   Patient denies current or recent self injurious behavior or ideation.   Patient denies other safety concerns.   Patient reports  there has been no change in risk factors since their last session.     Patient reports there has been no change in protective factors since their last session.     Recommended that patient call 911 or go to the local ED should there be a change in any of these risk factors.     Appearance:   Appropriate    Eye Contact:   Good    Psychomotor Behavior: Normal    Attitude:   Cooperative    Orientation:   All   Speech    Rate / Production: Normal     Volume:  Normal    Mood:    Normal   Affect:    Appropriate    Thought Content:  Clear    Thought Form:  Coherent  Logical    Insight:    Good      Medication Review:   No changes to current psychiatric medication(s)     Medication Compliance:   Yes     Changes in Health Issues:   None reported     Chemical Use Review:   Substance Use: Chemical use reviewed, no active concerns identified      Tobacco Use: No current tobacco use.      Diagnosis:  1. LINDA (generalized anxiety disorder)        Collateral Reports Completed:   Not Applicable    PLAN: (Patient Tasks / Therapist Tasks / Other)  Return in 1 week.  Practice PMR, get appt to establish with PCP.        Richelle Linton [facility]                                                         ______________________________________________________________________    Individual Treatment Plan    Patient's Name: Zackery Rogers  YOB: 1960    Date of Creation: 06/06/2024  Date Treatment Plan Last Reviewed/Revised: 06/06/2024    DSM5 Diagnoses: 300.02 (F41.1) Generalized Anxiety Disorder  Psychosocial / Contextual Factors: , employed, lives at home with wife and two adult sons  PROMIS (reviewed every 90 days): 530/2024    Referral / Collaboration:  Referral to another professional/service is not indicated at this time.    Anticipated number of session for this episode of care: 9-12 sessions  Anticipation frequency of session: Weeklythen re-evaluate after one month  Anticipated Duration of each session: 38-52  minutes  Treatment plan will be reviewed in 90 days or when goals have been changed.       MeasurableTreatment Goal(s) related to diagnosis / functional impairment(s)  Goal 1: Patient will decrease anxiety    I will know I've met my goal when I stop having anxiety attacks in stressful situations.      Objective #A (Patient Action)    Patient will use at least 3 coping skills for anxiety management in the next 12 weeks.  Status: New - Date: 2024      Intervention(s)  Therapist will provide psychoeducation, behavioral activation, and cognitive restructuring.    Objective #B  Patient will use cognitive strategies identified in therapy to challenge anxious thoughts.  Status: New - Date: 2024      Intervention(s)  Therapist will provide psychoeducation, behavioral activation, and cognitive restructuring.    Objective #C  Patient will identify three distraction and diversion activities and use those activities to decrease level of anxiety.  Status: New - Date: 2024      Intervention(s)  Therapist will provide psychoeducation, behavioral activation, and cognitive restructuring.    Patient has reviewed and agreed to the above plan.      Richelle Linton [facility]  2024      [facility]       PATIENT'S NAME: Zackery Rogers  PREFERRED NAME: Gustavo  PRONOUNS:     he him his  MRN: [id number]  : 1960  ADDRESS: [address] [place] [id number]  ACCT. NUMBER:  [id number]  DATE OF SERVICE: 24  START TIME: 731  END TIME: 820  PREFERRED PHONE: [phone]  May we leave a program related message: Yes  EMERGENCY CONTACT: was obtained David Cole   [phone].  SERVICE MODALITY:  Video Visit:      Provider verified identity through the following two step process.  Patient provided:  Patient  and Patient address    Telemedicine Visit: The patient's condition can be safely assessed and treated via synchronous audio and visual telemedicine encounter.      Reason for  "Telemedicine Visit: Patient convenience (e.g. access to timely appointments / distance to available provider)    Originating Site (Patient Location): Patient's home    Distant Site (Provider Location): Provider Remote Setting- Home Office    Consent:  The patient/guardian has verbally consented to: the potential risks and benefits of telemedicine (video visit) versus in person care; bill my insurance or make self-payment for services provided; and responsibility for payment of non-covered services.     Patient would like the video invitation sent by:  My Chart    Mode of Communication:  Video Conference via Amwell    Distant Location (Provider):  Off-site    As the provider I attest to compliance with applicable laws and regulations related to telemedicine.    UNIVERSAL ADULT Mental Health DIAGNOSTIC ASSESSMENT    Identifying Information:  Patient is a 63 year old,   individual.  Patient was referred for an assessment by primary care clinic.  Patient attended the session alone.    Chief Complaint:   The reason for seeking services at this time is: \"mental heaiyj\".  The problem(s) began 01/01/23.    Patient has attempted to resolve these concerns in the past through Therapy and medication .    Social/Family History:  Patient reported they grew up in [place]  .  They were raised by biological parents  .  Parents were always together.  Patient reported that their childhood was good-some siblings were in college when he was still young .  Patient described their current relationships with family of origin as has 4 siblings older than himself, he has regular contact with them.     The patient describes their cultural background as .  Cultural influences and impact on patient's life structure, values, norms, and healthcare: deisiamal.  Contextual influences on patient's health include: Contextual Factors: Individual Factors symptoms of anxiety, work stress .    These factors will be addressed in the " Preliminary Treatment plan. Patient identified their preferred language to be English. Patient reported they does not need the assistance of an  or other support involved in therapy.     Patient reported had no significant delays in developmental tasks.   Patient's highest education level was associate degree / vocational certificate  .  Patient identified the following learning problems: none reported.  Modifications will not be used to assist communication in therapy.  Patient reports they are  able to understand written materials.    Patient reported the following relationship history NA.  Patient's current relationship status is  .   Patient identified their sexual orientation as heterosexual.  Patient reported having 2 child(erlinda). Patient identified spouse as part of their support system.  Patient identified the quality of these relationships as good,  .      Patient's current living/housing situation involves staying in own home/apartment.  The immediate members of family and household include raj martinez, [age] and wife and two sons and they report that housing is stable.    Patient is currently employed fulltime.  Patient reports their finances are obtained through employment. Patient does not identify finances as a current stressor.      Patient reported that they have not been involved with the legal system.    . Patient does not report being under probation/ parole/ jurisdiction. They are not under any current court jurisdiction. .    Patient's Strengths and Limitations:  Patient identified the following strengths or resources that will help them succeed in treatment: family support. Things that may interfere with the patient's success in treatment include: none identified.     Assessments:  The following assessments were completed by patient for this visit:  PHQ9:       9/23/2022     7:10 AM 3/22/2023     7:38 AM 8/22/2023     7:57 AM 4/17/2024    11:33 AM 5/30/2024     6:56 AM   PHQ-9  SCORE   PHQ-9 Total Score MyChart  14 (Moderate depression) 11 (Moderate depression) 13 (Moderate depression) 11 (Moderate depression)   PHQ-9 Total Score 12 14 11 13 11     GAD7:       9/23/2022     7:10 AM 11/2/2022     8:14 AM 3/22/2023     7:43 AM 12/28/2023     7:10 AM 4/17/2024    11:31 AM 5/30/2024     7:23 AM 6/13/2024     9:20 AM   LINDA-7 SCORE   Total Score  6 (mild anxiety) 12 (moderate anxiety) 3 (minimal anxiety) 5 (mild anxiety) 15 (severe anxiety) 11 (moderate anxiety)   Total Score 9 6 12 3 5 15    15 11    11     CAGE-AID:       5/30/2024     7:28 AM   CAGE-AID Total Score   Total Score 0   Total Score MyChart 0 (A total score of 2 or greater is considered clinically significant)     PROMIS 10-Global Health (all questions and answers displayed):       5/30/2024     7:27 AM 6/13/2024     9:23 AM   PROMIS 10   In general, would you say your health is: Good Good   In general, would you say your quality of life is: Very good Poor   In general, how would you rate your physical health? Good Good   In general, how would you rate your mental health, including your mood and your ability to think? Poor Poor   In general, how would you rate your satisfaction with your social activities and relationships? Fair Poor   In general, please rate how well you carry out your usual social activities and roles Good Good   To what extent are you able to carry out your everyday physical activities such as walking, climbing stairs, carrying groceries, or moving a chair? Completely Mostly   In the past 7 days, how often have you been bothered by emotional problems such as feeling anxious, depressed, or irritable? Often Often   In the past 7 days, how would you rate your fatigue on average? Moderate Moderate   In the past 7 days, how would you rate your pain on average, where 0 means no pain, and 10 means worst imaginable pain? 10 2   In general, would you say your health is: 3 3   In general, would you say your quality of  life is: 4 1   In general, how would you rate your physical health? 3 3   In general, how would you rate your mental health, including your mood and your ability to think? 1 1   In general, how would you rate your satisfaction with your social activities and relationships? 2 1   In general, please rate how well you carry out your usual social activities and roles. (This includes activities at home, at work and in your community, and responsibilities as a parent, child, spouse, employee, friend, etc.) 3 3   To what extent are you able to carry out your everyday physical activities such as walking, climbing stairs, carrying groceries, or moving a chair? 5 4   In the past 7 days, how often have you been bothered by emotional problems such as feeling anxious, depressed, or irritable? 4 4   In the past 7 days, how would you rate your fatigue on average? 3 3   In the past 7 days, how would you rate your pain on average, where 0 means no pain, and 10 means worst imaginable pain? 10 2   Global Mental Health Score 9    9 5    5   Global Physical Health Score 12    12 14    14   PROMIS TOTAL - SUBSCORES 21    21 19    19     [place] Suicide Severity Rating Scale (Lifetime/Recent)      5/30/2024     7:00 AM   [place] Suicide Severity Rating (Lifetime/Recent)   Q1 Wish to be Dead (Lifetime) N   Q2 Non-Specific Active Suicidal Thoughts (Lifetime) N   Actual Attempt (Lifetime) N   Has subject engaged in non-suicidal self-injurious behavior? (Lifetime) N   Interrupted Attempts (Lifetime) N   Aborted or Self-Interrupted Attempt (Lifetime) N   Preparatory Acts or Behavior (Lifetime) N   Calculated C-SSRS Risk Score (Lifetime/Recent) No Risk Indicated       Personal and Family Medical History:  Patient does not report a family history of mental health concerns.  Patient reports family history includes Alzheimer Disease in his mother; Cerebrovascular Disease in his father; Family History Negative in his brother, brother, brother,  and sister; Myocardial Infarction in his mother..     Patient does report Mental Health Diagnosis and/or Treatment.  Patient Patient reported the following previous diagnoses which include(s): an Anxiety Disorder.  Patient reported symptoms began years ago.   Patient has received mental health services in the past:  therapy about a year ago-was [facility] .  Psychiatric Hospitalizations: None.  Patient denies a history of civil commitment.  Patient is not receiving other mental health services.  These include none.       Patient has had a physical exam to rule out medical causes for current symptoms.  Date of last physical exam was within the past year. Client was encouraged to follow up with PCP if symptoms were to develop. The patient has a [place] Primary Care Provider, who is named No Ref-Primary, Physician..  Patient reports the following current medical concerns: type 2 diabetes .  Patient denies any issues with pain..   There are not significant appetite / nutritional concerns / weight changes.   Patient does not report a history of head injury / trauma / cognitive impairment.      Patient reports current meds as:   Current Outpatient Medications   Medication Sig Dispense Refill    alcohol swab prep pads Use to swab area of injection/rosalba as directed. (Patient not taking: Reported on 4/17/2024) 100 each 3    aspirin (ASPIRIN LOW DOSE) 81 MG chewable tablet CHEW AND SWALLOW ONE TABLET BY MOUTH ONE TIME DAILY. 90 tablet 0    blood glucose (NO BRAND SPECIFIED) test strip Use to test blood sugar 1 times daily or as directed. To accompany: Blood Glucose Monitor Brands: per insurance. (Patient not taking: Reported on 4/17/2024) 100 strip 6    blood glucose calibration (NO BRAND SPECIFIED) solution To accompany: Blood Glucose Monitor Brands: per insurance. (Patient not taking: Reported on 4/17/2024) 1 each 1    blood glucose monitoring (NO BRAND SPECIFIED) meter device kit Use to test blood sugar 1 times daily or as  directed. Preferred blood glucose meter OR supplies to accompany: Blood Glucose Monitor Brands: per insurance. (Patient not taking: Reported on 4/17/2024) 1 kit 0    hydrOXYzine cande (VISTARIL) 25 MG capsule Take 1 capsule (25 mg) by mouth 3 times daily as needed for anxiety 20 capsule 4    insulin glargine 100 UNIT/ML pen Inject 25 Units Subcutaneous at bedtime 8 mL 2    insulin pen needle (31G X 6 MM) 31G X 6 MM miscellaneous Use 1 pen needles daily or as directed. (Patient not taking: Reported on 4/17/2024) 100 each 3    lisinopril (ZESTRIL) 40 MG tablet Take 1 tablet (40 mg) by mouth daily - DUE FOR DIABETIC FOLLOW UP 30 tablet 0    metFORMIN (GLUCOPHAGE XR) 500 MG 24 hr tablet Take 2 tablets (1,000 mg) by mouth 2 times daily (with meals) 360 tablet 0    minocycline (MINOCIN) 100 MG capsule Take 1 capsule (100 mg) by mouth 2 times daily 180 capsule 3    ondansetron (ZOFRAN ODT) 4 MG ODT tab Take 1 tablet (4 mg) by mouth every 6 hours as needed for nausea 15 tablet 0    SENNA-docusate sodium (SENNA S) 8.6-50 MG tablet Take 1 tablet by mouth daily as needed 30 tablet 0    sertraline (ZOLOFT) 100 MG tablet Take 1.5 tablets (150 mg) by mouth daily 135 tablet 1    simvastatin (ZOCOR) 20 MG tablet Take 1 tablet (20 mg) by mouth at bedtime - DUE FOR DIABETIC FOLLOW UP 30 tablet 1    thin (NO BRAND SPECIFIED) lancets Use with lanceting device. To accompany: Blood Glucose Monitor Brands: per insurance. (Patient not taking: Reported on 4/17/2024) 100 each 11     No current facility-administered medications for this visit.       Medication Adherence:  Patient reports taking.  taking prescribed medications as prescribed.    Patient Allergies:  No Known Allergies    Medical History:    Past Medical History:   Diagnosis Date    DISH (diffuse idiopathic skeletal hyperostosis)     History of acute pancreatitis 2015    Hypertension     Obesity     Pancreatic mass     Rosacea     Type 2 diabetes mellitus with other specified  complication (H)     Type 2 diabetes mellitus without complication, without long-term current use of insulin (H) 03/07/2019         Current Mental Status Exam:   Appearance:  Appropriate    Eye Contact:  Good   Psychomotor:  Normal       Gait / station:  Not assessed  Attitude / Demeanor: Cooperative  Pleasant  Speech      Rate / Production: Normal/ Responsive      Volume:  Normal  volume      Language:  intact  Mood:   Normal  Affect:   Appropriate    Thought Content: Clear   Thought Process: Coherent       Associations: No loosening of associations  Insight:   Good   Judgment:  Intact   Orientation:  All  Attention/concentration: Good    Substance Use:   Patient did not report a family history of substance use concerns; see medical history section for details.  Patient has not received chemical dependency treatment in the past.  Patient has not ever been to detox.      Patient is not currently receiving any chemical dependency treatment.           Substance History of use Age of first use Date of last use     Pattern and duration of use (include amounts and frequency)   Alcohol used in the past   15 01/12/24 REPORTS SUBSTANCE USE: N/A   Cannabis   used in the past 15 05/20/23 REPORTS SUBSTANCE USE: N/A     Amphetamines   never used     REPORTS SUBSTANCE USE: N/A   Cocaine/crack    never used       REPORTS SUBSTANCE USE: N/A   Hallucinogens never used         REPORTS SUBSTANCE USE: N/A   Inhalants never used         REPORTS SUBSTANCE USE: N/A   Heroin never used         REPORTS SUBSTANCE USE: N/A   Other Opiates never used     REPORTS SUBSTANCE USE: N/A   Benzodiazepine   never used     REPORTS SUBSTANCE USE: N/A   Barbiturates never used     REPORTS SUBSTANCE USE: N/A   Over the counter meds used in the past 9 01/10/23 REPORTS SUBSTANCE USE: N/A   Caffeine currently use 5   Reports 3-4 cans of soda per day.   Nicotine  never used     REPORTS SUBSTANCE USE: N/A   Other substances not listed above:  Identify:   never used     REPORTS SUBSTANCE USE: N/A     Patient reported the following problems as a result of their substance use: None    Substance Use: No symptoms    Based on the negative CAGE score and clinical interview there  are not indications of drug or alcohol abuse.    Significant Losses / Trauma / Abuse / Neglect Issues:   Patient did not  serve in the .  There are indications or report of significant loss, trauma, abuse or neglect issues related to: are no indications and client denies any losses, trauma, abuse, or neglect concerns.  Concerns for possible neglect are not present.     Safety Assessment:   Patient denies current homicidal ideation and behaviors.  Patient denies current self-injurious ideation and behaviors.    Patient denied risk behaviors associated with substance use.   Patient denies any high risk behaviors associated with mental health symptoms.  Patient reports the following current concerns for their personal safety: None.  Patient reports there are firearms in the house.     yes, they are secured. The firearms are secured in a locked space.    History of Safety Concerns:  Patient denied a history of homicidal ideation.     Patient denied a history of personal safety concerns.    Patient denied a history of assaultive behaviors.    Patient denied a history of sexual assault behaviors.     Patient denied a history of risk behaviors associated with substance use.  Patient denies any history of high risk behaviors associated with mental health symptoms.  Patient reports the following protective factors: dedication to family or friends; sense of personal control or determination    Risk Plan:  See Recommendations for Safety and Risk Management Plan    Review of Symptoms per patient report:   Depression: Change in sleep, Lack of interest, Excessive or inappropriate guilt, Difficulties concentrating, Feelings of hopelessness, Feelings of helplessness, Irritability, Feeling sad, down, or  depressed, and Anger outbursts  Corry:  No Symptoms  Psychosis: No Symptoms  Anxiety: Excessive worry, Nervousness, Physical complaints, such as headaches, stomachaches, muscle tension, Poor concentration, Irritability, and Anger outbursts  Panic:  Client reports prolonged panic at work  Post Traumatic Stress Disorder:  No Symptoms   Eating Disorder: No Symptoms  ADD / ADHD:  No symptoms  Conduct Disorder: No symptoms  Autism Spectrum Disorder: No symptoms  Obsessive Compulsive Disorder: No Symptoms    Patient reports the following compulsive behaviors and treatment history:  None .      Diagnostic Criteria:   Generalized Anxiety Disorder  A. Excessive anxiety and worry about a number of events or activities (such as work or school performance).   B. The person finds it difficult to control the worry.  C. Select 3 or more symptoms (required for diagnosis). Only one item is required in children.   - Restlessness or feeling keyed up or on edge.    - Being easily fatigued.    - Difficulty concentrating or mind going blank.    - Irritability.    - Sleep disturbance (difficulty falling or staying asleep, or restless unsatisfying sleep).   D. The focus of the anxiety and worry is not confined to features of an Axis I disorder.  E. The anxiety, worry, or physical symptoms cause clinically significant distress or impairment in social, occupational, or other important areas of functioning.   F. The disturbance is not due to the direct physiological effects of a substance (e.g., a drug of abuse, a medication) or a general medical condition (e.g., hyperthyroidism) and does not occur exclusively during a Mood Disorder, a Psychotic Disorder, or a Pervasive Developmental Disorder.    - The aformentioned symptoms began several year(s) ago and occurs 7 days per week and is experienced as severe.    Functional Status:  Patient reports the following functional impairments:  work / vocational responsibilities.     Nonprogrammatic  care:  Patient is requesting basic services to address current mental health concerns.    Clinical Summary:  1. Psychosocial, Cultural and Contextual Factors: , employed, lives in own home with wife and two adult sons  .  2. Principal DSM5 Diagnoses  (Sustained by DSM5 Criteria Listed Above):   300.02 (F41.1) Generalized Anxiety Disorder.  3. Other Diagnoses that is relevant to services:   NA  4. Provisional Diagnosis:  NA  5. Prognosis: Expect Improvement and Relieve Acute Symptoms.  6. Likely consequences of symptoms if not treated: worsening anxiety.  7. Client strengths include:  employed, goal-focused, has a previous history of therapy, motivated, open to learning, open to suggestions / feedback, responsible parent, and support of family, friends and providers .     Recommendations:     1. Plan for Safety and Risk Management:   Safety and Risk: Recommended that patient call 911 or go to the local ED should there be a change in any of these risk factors..          Report to child / adult protection services was NA.     2. Patient's identified  no concerns with sexual orientation, gender identity, culture, ethnicity, or margareth .     3. Initial Treatment will focus on:    Anxiety - LINDA  Functional Impairment at: work.     4. Resources/Service Plan:    services are not indicated.   Modifications to assist communication are not indicated.   Additional disability accommodations are not indicated.      5. Collaboration:   Collaboration / coordination of treatment will be initiated with the following  support professionals: primary care physician as clinically indicated.     6.  Referrals:   The following referral(s) will be initiated:  NA .       A Release of Information has been obtained for the following:  NA .     Clinical Substantiation/medical necessity for the above recommendations:  individual therapy is necessary in order to alleviate symptoms and restore functioning.    7. JERRY:    JERRY:  not  identified    8. Records:   These were reviewed at time of assessment.   Information in this assessment was obtained from the medical record and  provided by patient who is a good historian.    Patient will have open access to their mental health medical record.    9.   Interactive Complexity: No      Provider Name/ Credentials:  Richelle Linton MS, [facility]  May 30, 2024

## 2024-06-19 DIAGNOSIS — E11.9 TYPE 2 DIABETES MELLITUS WITHOUT COMPLICATION, WITHOUT LONG-TERM CURRENT USE OF INSULIN (H): ICD-10-CM

## 2024-06-20 ENCOUNTER — VIRTUAL VISIT (OUTPATIENT)
Dept: PSYCHOLOGY | Facility: CLINIC | Age: 64
End: 2024-06-20
Payer: COMMERCIAL

## 2024-06-20 DIAGNOSIS — F41.1 GAD (GENERALIZED ANXIETY DISORDER): Primary | ICD-10-CM

## 2024-06-20 PROCEDURE — 90834 PSYTX W PT 45 MINUTES: CPT | Mod: 95

## 2024-06-20 RX ORDER — INSULIN GLARGINE 100 [IU]/ML
40 INJECTION, SOLUTION SUBCUTANEOUS AT BEDTIME
Qty: 15 ML | Refills: 0 | Status: SHIPPED | OUTPATIENT
Start: 2024-06-20 | End: 2024-08-01

## 2024-06-20 NOTE — PROGRESS NOTES
[facility]                                     Progress Note    Patient Name: Zackery Rogers  Date: 06/20/2024         Service Type: Individual      Session Start Time: 737 Session End Time: 823     Session Length: 46    Session #: 4    Attendees: Client attended alone    Service Modality:  Video Visit:      Provider verified identity through the following two step process.  Patient provided:  Patient is known previously to provider    Telemedicine Visit: The patient's condition can be safely assessed and treated via synchronous audio and visual telemedicine encounter.      Reason for Telemedicine Visit: Patient has requested telehealth visit    Originating Site (Patient Location): Patient's home    Distant Site (Provider Location): Provider Remote Setting- Home Office    Consent:  The patient/guardian has verbally consented to: the potential risks and benefits of telemedicine (video visit) versus in person care; bill my insurance or make self-payment for services provided; and responsibility for payment of non-covered services.     Patient would like the video invitation sent by:  My Chart    Mode of Communication:  Video Conference via Amwell    Distant Location (Provider):  Off-site    As the provider I attest to compliance with applicable laws and regulations related to telemedicine.    DATA  Interactive Complexity: No  Crisis: No        Progress Since Last Session (Related to Symptoms / Goals / Homework):   Symptoms: Improving      Homework: completed, PMR and sleep hygiene      Episode of Care Goals: Satisfactory progress - ACTION (Actively working towards change); Intervened by reinforcing change plan / affirming steps taken     Current / Ongoing Stressors and Concerns:   Work stress, health concerns, panic attacks     Treatment Objective(s) Addressed in This Session:   Learn and utilize coping strategies for anxiety       Intervention:   Discussion of Thought-feeling-action today,  and introduction of challenge of unhelpful thinking. Utilized client's example to illustrate.  Discussion of time out strategy.  Sent to client.      Assessments completed prior to visit:  The following assessments were completed by patient for this visit:  PHQ9:       9/23/2022     7:10 AM 3/22/2023     7:38 AM 8/22/2023     7:57 AM 4/17/2024    11:33 AM 5/30/2024     6:56 AM   PHQ-9 SCORE   PHQ-9 Total Score MyChart  14 (Moderate depression) 11 (Moderate depression) 13 (Moderate depression) 11 (Moderate depression)   PHQ-9 Total Score 12 14 11 13 11     GAD7:       9/23/2022     7:10 AM 11/2/2022     8:14 AM 3/22/2023     7:43 AM 12/28/2023     7:10 AM 4/17/2024    11:31 AM 5/30/2024     7:23 AM 6/13/2024     9:20 AM   LINDA-7 SCORE   Total Score  6 (mild anxiety) 12 (moderate anxiety) 3 (minimal anxiety) 5 (mild anxiety) 15 (severe anxiety) 11 (moderate anxiety)   Total Score 9 6 12 3 5 15    15 11    11     PROMIS 10-Global Health (all questions and answers displayed):       5/30/2024     7:27 AM 6/13/2024     9:23 AM   PROMIS 10   In general, would you say your health is: Good Good   In general, would you say your quality of life is: Very good Poor   In general, how would you rate your physical health? Good Good   In general, how would you rate your mental health, including your mood and your ability to think? Poor Poor   In general, how would you rate your satisfaction with your social activities and relationships? Fair Poor   In general, please rate how well you carry out your usual social activities and roles Good Good   To what extent are you able to carry out your everyday physical activities such as walking, climbing stairs, carrying groceries, or moving a chair? Completely Mostly   In the past 7 days, how often have you been bothered by emotional problems such as feeling anxious, depressed, or irritable? Often Often   In the past 7 days, how would you rate your fatigue on average? Moderate Moderate   In  the past 7 days, how would you rate your pain on average, where 0 means no pain, and 10 means worst imaginable pain? 10 2   In general, would you say your health is: 3 3   In general, would you say your quality of life is: 4 1   In general, how would you rate your physical health? 3 3   In general, how would you rate your mental health, including your mood and your ability to think? 1 1   In general, how would you rate your satisfaction with your social activities and relationships? 2 1   In general, please rate how well you carry out your usual social activities and roles. (This includes activities at home, at work and in your community, and responsibilities as a parent, child, spouse, employee, friend, etc.) 3 3   To what extent are you able to carry out your everyday physical activities such as walking, climbing stairs, carrying groceries, or moving a chair? 5 4   In the past 7 days, how often have you been bothered by emotional problems such as feeling anxious, depressed, or irritable? 4 4   In the past 7 days, how would you rate your fatigue on average? 3 3   In the past 7 days, how would you rate your pain on average, where 0 means no pain, and 10 means worst imaginable pain? 10 2   Global Mental Health Score 9    9 5    5   Global Physical Health Score 12    12 14    14   PROMIS TOTAL - SUBSCORES 21    21 19    19         ASSESSMENT: Current Emotional / Mental Status (status of significant symptoms):   Risk status (Self / Other harm or suicidal ideation)   Patient denies current fears or concerns for personal safety.   Patient denies current or recent suicidal ideation or behaviors.   Patient denies current or recent homicidal ideation or behaviors.   Patient denies current or recent self injurious behavior or ideation.   Patient denies other safety concerns.   Patient reports there has been no change in risk factors since their last session.     Patient reports there has been no change in protective factors  since their last session.     Recommended that patient call 911 or go to the local ED should there be a change in any of these risk factors.     Appearance:   Appropriate    Eye Contact:   Good    Psychomotor Behavior: Normal    Attitude:   Cooperative    Orientation:   All   Speech    Rate / Production: Normal     Volume:  Normal    Mood:    Normal   Affect:    Appropriate    Thought Content:  Clear    Thought Form:  Coherent  Logical    Insight:    Good      Medication Review:   No changes to current psychiatric medication(s)     Medication Compliance:   Yes     Changes in Health Issues:   None reported     Chemical Use Review:   Substance Use: Chemical use reviewed, no active concerns identified      Tobacco Use: No current tobacco use.      Diagnosis:  1. LINDA (generalized anxiety disorder)          Collateral Reports Completed:   Not Applicable    PLAN: (Patient Tasks / Therapist Tasks / Other)  Return in 1 week.  Practice PMR, get appt to establish with PCP.        Richelle Linton [facility]                                                         ______________________________________________________________________    Individual Treatment Plan    Patient's Name: Zackery Rogers  YOB: 1960    Date of Creation: 06/06/2024  Date Treatment Plan Last Reviewed/Revised: 06/06/2024    DSM5 Diagnoses: 300.02 (F41.1) Generalized Anxiety Disorder  Psychosocial / Contextual Factors: , employed, lives at home with wife and two adult sons  PROMIS (reviewed every 90 days): 530/2024    Referral / Collaboration:  Referral to another professional/service is not indicated at this time.    Anticipated number of session for this episode of care: 9-12 sessions  Anticipation frequency of session: Weeklythen re-evaluate after one month  Anticipated Duration of each session: 38-52 minutes  Treatment plan will be reviewed in 90 days or when goals have been changed.       MeasurableTreatment Goal(s) related to  diagnosis / functional impairment(s)  Goal 1: Patient will decrease anxiety    I will know I've met my goal when I stop having anxiety attacks in stressful situations.      Objective #A (Patient Action)    Patient will use at least 3 coping skills for anxiety management in the next 12 weeks.  Status: New - Date: 2024      Intervention(s)  Therapist will provide psychoeducation, behavioral activation, and cognitive restructuring.    Objective #B  Patient will use cognitive strategies identified in therapy to challenge anxious thoughts.  Status: New - Date: 2024      Intervention(s)  Therapist will provide psychoeducation, behavioral activation, and cognitive restructuring.    Objective #C  Patient will identify three distraction and diversion activities and use those activities to decrease level of anxiety.  Status: New - Date: 2024      Intervention(s)  Therapist will provide psychoeducation, behavioral activation, and cognitive restructuring.    Patient has reviewed and agreed to the above plan.      Richelle Linton [facility]  2024      [facility]       PATIENT'S NAME: Zackery SOUTH Sue  PREFERRED NAME: Gustavo  PRONOUNS:     he him his  MRN: [id number]  : 1960  ADDRESS: [address] [place] [id number]  ACCT. NUMBER:  [id number]  DATE OF SERVICE: 24  START TIME: 731  END TIME: 820  PREFERRED PHONE: [phone]  May we leave a program related message: Yes  EMERGENCY CONTACT: was obtained David HERNANDEZAleksanderNisha   [phone].  SERVICE MODALITY:  Video Visit:      Provider verified identity through the following two step process.  Patient provided:  Patient  and Patient address    Telemedicine Visit: The patient's condition can be safely assessed and treated via synchronous audio and visual telemedicine encounter.      Reason for Telemedicine Visit: Patient convenience (e.g. access to timely appointments / distance to available provider)    Originating Site (Patient  "Location): Patient's home    Distant Site (Provider Location): Provider Remote Setting- Home Office    Consent:  The patient/guardian has verbally consented to: the potential risks and benefits of telemedicine (video visit) versus in person care; bill my insurance or make self-payment for services provided; and responsibility for payment of non-covered services.     Patient would like the video invitation sent by:  My Chart    Mode of Communication:  Video Conference via Amwell    Distant Location (Provider):  Off-site    As the provider I attest to compliance with applicable laws and regulations related to telemedicine.    UNIVERSAL ADULT Mental Health DIAGNOSTIC ASSESSMENT    Identifying Information:  Patient is a 63 year old,   individual.  Patient was referred for an assessment by primary care clinic.  Patient attended the session alone.    Chief Complaint:   The reason for seeking services at this time is: \"mental heaiyj\".  The problem(s) began 01/01/23.    Patient has attempted to resolve these concerns in the past through Therapy and medication .    Social/Family History:  Patient reported they grew up in [place] [place]  .  They were raised by biological parents  .  Parents were always together.  Patient reported that their childhood was good-some siblings were in college when he was still young .  Patient described their current relationships with family of origin as has 4 siblings older than himself, he has regular contact with them.     The patient describes their cultural background as .  Cultural influences and impact on patient's life structure, values, norms, and healthcare: reina.  Contextual influences on patient's health include: Contextual Factors: Individual Factors symptoms of anxiety, work stress .    These factors will be addressed in the Preliminary Treatment plan. Patient identified their preferred language to be English. Patient reported they does not need the assistance " of an  or other support involved in therapy.     Patient reported had no significant delays in developmental tasks.   Patient's highest education level was associate degree / vocational certificate  .  Patient identified the following learning problems: none reported.  Modifications will not be used to assist communication in therapy.  Patient reports they are  able to understand written materials.    Patient reported the following relationship history NA.  Patient's current relationship status is  .   Patient identified their sexual orientation as heterosexual.  Patient reported having 2 child(erlinda). Patient identified spouse as part of their support system.  Patient identified the quality of these relationships as good,  .      Patient's current living/housing situation involves staying in own home/apartment.  The immediate members of family and household include raj martinez, [age] and wife and two sons and they report that housing is stable.    Patient is currently employed fulltime.  Patient reports their finances are obtained through employment. Patient does not identify finances as a current stressor.      Patient reported that they have not been involved with the legal system.    . Patient does not report being under probation/ parole/ jurisdiction. They are not under any current court jurisdiction. .    Patient's Strengths and Limitations:  Patient identified the following strengths or resources that will help them succeed in treatment: family support. Things that may interfere with the patient's success in treatment include: none identified.     Assessments:  The following assessments were completed by patient for this visit:  PHQ9:       9/23/2022     7:10 AM 3/22/2023     7:38 AM 8/22/2023     7:57 AM 4/17/2024    11:33 AM 5/30/2024     6:56 AM   PHQ-9 SCORE   PHQ-9 Total Score Jacy  14 (Moderate depression) 11 (Moderate depression) 13 (Moderate depression) 11 (Moderate depression)    PHQ-9 Total Score 12 14 11 13 11     GAD7:       9/23/2022     7:10 AM 11/2/2022     8:14 AM 3/22/2023     7:43 AM 12/28/2023     7:10 AM 4/17/2024    11:31 AM 5/30/2024     7:23 AM 6/13/2024     9:20 AM   LINDA-7 SCORE   Total Score  6 (mild anxiety) 12 (moderate anxiety) 3 (minimal anxiety) 5 (mild anxiety) 15 (severe anxiety) 11 (moderate anxiety)   Total Score 9 6 12 3 5 15    15 11    11     CAGE-AID:       5/30/2024     7:28 AM   CAGE-AID Total Score   Total Score 0   Total Score MyChart 0 (A total score of 2 or greater is considered clinically significant)     PROMIS 10-Global Health (all questions and answers displayed):       5/30/2024     7:27 AM 6/13/2024     9:23 AM   PROMIS 10   In general, would you say your health is: Good Good   In general, would you say your quality of life is: Very good Poor   In general, how would you rate your physical health? Good Good   In general, how would you rate your mental health, including your mood and your ability to think? Poor Poor   In general, how would you rate your satisfaction with your social activities and relationships? Fair Poor   In general, please rate how well you carry out your usual social activities and roles Good Good   To what extent are you able to carry out your everyday physical activities such as walking, climbing stairs, carrying groceries, or moving a chair? Completely Mostly   In the past 7 days, how often have you been bothered by emotional problems such as feeling anxious, depressed, or irritable? Often Often   In the past 7 days, how would you rate your fatigue on average? Moderate Moderate   In the past 7 days, how would you rate your pain on average, where 0 means no pain, and 10 means worst imaginable pain? 10 2   In general, would you say your health is: 3 3   In general, would you say your quality of life is: 4 1   In general, how would you rate your physical health? 3 3   In general, how would you rate your mental health, including  your mood and your ability to think? 1 1   In general, how would you rate your satisfaction with your social activities and relationships? 2 1   In general, please rate how well you carry out your usual social activities and roles. (This includes activities at home, at work and in your community, and responsibilities as a parent, child, spouse, employee, friend, etc.) 3 3   To what extent are you able to carry out your everyday physical activities such as walking, climbing stairs, carrying groceries, or moving a chair? 5 4   In the past 7 days, how often have you been bothered by emotional problems such as feeling anxious, depressed, or irritable? 4 4   In the past 7 days, how would you rate your fatigue on average? 3 3   In the past 7 days, how would you rate your pain on average, where 0 means no pain, and 10 means worst imaginable pain? 10 2   Global Mental Health Score 9    9 5    5   Global Physical Health Score 12    12 14    14   PROMIS TOTAL - SUBSCORES 21    21 19    19     [place] Suicide Severity Rating Scale (Lifetime/Recent)      5/30/2024     7:00 AM   [place] Suicide Severity Rating (Lifetime/Recent)   Q1 Wish to be Dead (Lifetime) N   Q2 Non-Specific Active Suicidal Thoughts (Lifetime) N   Actual Attempt (Lifetime) N   Has subject engaged in non-suicidal self-injurious behavior? (Lifetime) N   Interrupted Attempts (Lifetime) N   Aborted or Self-Interrupted Attempt (Lifetime) N   Preparatory Acts or Behavior (Lifetime) N   Calculated C-SSRS Risk Score (Lifetime/Recent) No Risk Indicated       Personal and Family Medical History:  Patient does not report a family history of mental health concerns.  Patient reports family history includes Alzheimer Disease in his mother; Cerebrovascular Disease in his father; Family History Negative in his brother, brother, brother, and sister; Myocardial Infarction in his mother..     Patient does report Mental Health Diagnosis and/or Treatment.  Patient Patient  reported the following previous diagnoses which include(s): an Anxiety Disorder.  Patient reported symptoms began years ago.   Patient has received mental health services in the past:  therapy about a year ago-was [facility] .  Psychiatric Hospitalizations: None.  Patient denies a history of civil commitment.  Patient is not receiving other mental health services.  These include none.       Patient has had a physical exam to rule out medical causes for current symptoms.  Date of last physical exam was within the past year. Client was encouraged to follow up with PCP if symptoms were to develop. The patient has a [place] Primary Care Provider, who is named No Ref-Primary, Physician..  Patient reports the following current medical concerns: type 2 diabetes .  Patient denies any issues with pain..   There are not significant appetite / nutritional concerns / weight changes.   Patient does not report a history of head injury / trauma / cognitive impairment.      Patient reports current meds as:   Current Outpatient Medications   Medication Sig Dispense Refill    alcohol swab prep pads Use to swab area of injection/rosalba as directed. (Patient not taking: Reported on 4/17/2024) 100 each 3    aspirin (ASPIRIN LOW DOSE) 81 MG chewable tablet CHEW AND SWALLOW ONE TABLET BY MOUTH ONE TIME DAILY. 90 tablet 0    blood glucose (NO BRAND SPECIFIED) test strip Use to test blood sugar 1 times daily or as directed. To accompany: Blood Glucose Monitor Brands: per insurance. (Patient not taking: Reported on 4/17/2024) 100 strip 6    blood glucose calibration (NO BRAND SPECIFIED) solution To accompany: Blood Glucose Monitor Brands: per insurance. (Patient not taking: Reported on 4/17/2024) 1 each 1    blood glucose monitoring (NO BRAND SPECIFIED) meter device kit Use to test blood sugar 1 times daily or as directed. Preferred blood glucose meter OR supplies to accompany: Blood Glucose Monitor Brands: per insurance. (Patient not taking:  Reported on 4/17/2024) 1 kit 0    hydrOXYzine cande (VISTARIL) 25 MG capsule Take 1 capsule (25 mg) by mouth 3 times daily as needed for anxiety 20 capsule 4    insulin glargine 100 UNIT/ML pen Inject 25 Units Subcutaneous at bedtime 8 mL 2    insulin pen needle (31G X 6 MM) 31G X 6 MM miscellaneous Use 1 pen needles daily or as directed. (Patient not taking: Reported on 4/17/2024) 100 each 3    lisinopril (ZESTRIL) 40 MG tablet Take 1 tablet (40 mg) by mouth daily - DUE FOR DIABETIC FOLLOW UP 30 tablet 0    metFORMIN (GLUCOPHAGE XR) 500 MG 24 hr tablet Take 2 tablets (1,000 mg) by mouth 2 times daily (with meals) 360 tablet 0    minocycline (MINOCIN) 100 MG capsule Take 1 capsule (100 mg) by mouth 2 times daily 180 capsule 3    ondansetron (ZOFRAN ODT) 4 MG ODT tab Take 1 tablet (4 mg) by mouth every 6 hours as needed for nausea 15 tablet 0    SENNA-docusate sodium (SENNA S) 8.6-50 MG tablet Take 1 tablet by mouth daily as needed 30 tablet 0    sertraline (ZOLOFT) 100 MG tablet Take 1.5 tablets (150 mg) by mouth daily 135 tablet 1    simvastatin (ZOCOR) 20 MG tablet Take 1 tablet (20 mg) by mouth at bedtime - DUE FOR DIABETIC FOLLOW UP 30 tablet 1    thin (NO BRAND SPECIFIED) lancets Use with lanceting device. To accompany: Blood Glucose Monitor Brands: per insurance. (Patient not taking: Reported on 4/17/2024) 100 each 11     No current facility-administered medications for this visit.       Medication Adherence:  Patient reports taking.  taking prescribed medications as prescribed.    Patient Allergies:  No Known Allergies    Medical History:    Past Medical History:   Diagnosis Date    DISH (diffuse idiopathic skeletal hyperostosis)     History of acute pancreatitis 2015    Hypertension     Obesity     Pancreatic mass     Rosacea     Type 2 diabetes mellitus with other specified complication (H)     Type 2 diabetes mellitus without complication, without long-term current use of insulin (H) 03/07/2019          Current Mental Status Exam:   Appearance:  Appropriate    Eye Contact:  Good   Psychomotor:  Normal       Gait / station:  Not assessed  Attitude / Demeanor: Cooperative  Pleasant  Speech      Rate / Production: Normal/ Responsive      Volume:  Normal  volume      Language:  intact  Mood:   Normal  Affect:   Appropriate    Thought Content: Clear   Thought Process: Coherent       Associations: No loosening of associations  Insight:   Good   Judgment:  Intact   Orientation:  All  Attention/concentration: Good    Substance Use:   Patient did not report a family history of substance use concerns; see medical history section for details.  Patient has not received chemical dependency treatment in the past.  Patient has not ever been to detox.      Patient is not currently receiving any chemical dependency treatment.           Substance History of use Age of first use Date of last use     Pattern and duration of use (include amounts and frequency)   Alcohol used in the past   15 01/12/24 REPORTS SUBSTANCE USE: N/A   Cannabis   used in the past 15 05/20/23 REPORTS SUBSTANCE USE: N/A     Amphetamines   never used     REPORTS SUBSTANCE USE: N/A   Cocaine/crack    never used       REPORTS SUBSTANCE USE: N/A   Hallucinogens never used         REPORTS SUBSTANCE USE: N/A   Inhalants never used         REPORTS SUBSTANCE USE: N/A   Heroin never used         REPORTS SUBSTANCE USE: N/A   Other Opiates never used     REPORTS SUBSTANCE USE: N/A   Benzodiazepine   never used     REPORTS SUBSTANCE USE: N/A   Barbiturates never used     REPORTS SUBSTANCE USE: N/A   Over the counter meds used in the past 9 01/10/23 REPORTS SUBSTANCE USE: N/A   Caffeine currently use 5   Reports 3-4 cans of soda per day.   Nicotine  never used     REPORTS SUBSTANCE USE: N/A   Other substances not listed above:  Identify:  never used     REPORTS SUBSTANCE USE: N/A     Patient reported the following problems as a result of their substance use:  None    Substance Use: No symptoms    Based on the negative CAGE score and clinical interview there  are not indications of drug or alcohol abuse.    Significant Losses / Trauma / Abuse / Neglect Issues:   Patient did not  serve in the .  There are indications or report of significant loss, trauma, abuse or neglect issues related to: are no indications and client denies any losses, trauma, abuse, or neglect concerns.  Concerns for possible neglect are not present.     Safety Assessment:   Patient denies current homicidal ideation and behaviors.  Patient denies current self-injurious ideation and behaviors.    Patient denied risk behaviors associated with substance use.   Patient denies any high risk behaviors associated with mental health symptoms.  Patient reports the following current concerns for their personal safety: None.  Patient reports there are firearms in the house.     yes, they are secured. The firearms are secured in a locked space.    History of Safety Concerns:  Patient denied a history of homicidal ideation.     Patient denied a history of personal safety concerns.    Patient denied a history of assaultive behaviors.    Patient denied a history of sexual assault behaviors.     Patient denied a history of risk behaviors associated with substance use.  Patient denies any history of high risk behaviors associated with mental health symptoms.  Patient reports the following protective factors: dedication to family or friends; sense of personal control or determination    Risk Plan:  See Recommendations for Safety and Risk Management Plan    Review of Symptoms per patient report:   Depression: Change in sleep, Lack of interest, Excessive or inappropriate guilt, Difficulties concentrating, Feelings of hopelessness, Feelings of helplessness, Irritability, Feeling sad, down, or depressed, and Anger outbursts  Corry:  No Symptoms  Psychosis: No Symptoms  Anxiety: Excessive worry, Nervousness, Physical  complaints, such as headaches, stomachaches, muscle tension, Poor concentration, Irritability, and Anger outbursts  Panic:  Client reports prolonged panic at work  Post Traumatic Stress Disorder:  No Symptoms   Eating Disorder: No Symptoms  ADD / ADHD:  No symptoms  Conduct Disorder: No symptoms  Autism Spectrum Disorder: No symptoms  Obsessive Compulsive Disorder: No Symptoms    Patient reports the following compulsive behaviors and treatment history:  None .      Diagnostic Criteria:   Generalized Anxiety Disorder  A. Excessive anxiety and worry about a number of events or activities (such as work or school performance).   B. The person finds it difficult to control the worry.  C. Select 3 or more symptoms (required for diagnosis). Only one item is required in children.   - Restlessness or feeling keyed up or on edge.    - Being easily fatigued.    - Difficulty concentrating or mind going blank.    - Irritability.    - Sleep disturbance (difficulty falling or staying asleep, or restless unsatisfying sleep).   D. The focus of the anxiety and worry is not confined to features of an Axis I disorder.  E. The anxiety, worry, or physical symptoms cause clinically significant distress or impairment in social, occupational, or other important areas of functioning.   F. The disturbance is not due to the direct physiological effects of a substance (e.g., a drug of abuse, a medication) or a general medical condition (e.g., hyperthyroidism) and does not occur exclusively during a Mood Disorder, a Psychotic Disorder, or a Pervasive Developmental Disorder.    - The aformentioned symptoms began several year(s) ago and occurs 7 days per week and is experienced as severe.    Functional Status:  Patient reports the following functional impairments:  work / vocational responsibilities.     Nonprogrammatic care:  Patient is requesting basic services to address current mental health concerns.    Clinical Summary:  1. Psychosocial,  Cultural and Contextual Factors: , employed, lives in own home with wife and two adult sons  .  2. Principal DSM5 Diagnoses  (Sustained by DSM5 Criteria Listed Above):   300.02 (F41.1) Generalized Anxiety Disorder.  3. Other Diagnoses that is relevant to services:   NA  4. Provisional Diagnosis:  NA  5. Prognosis: Expect Improvement and Relieve Acute Symptoms.  6. Likely consequences of symptoms if not treated: worsening anxiety.  7. Client strengths include:  employed, goal-focused, has a previous history of therapy, motivated, open to learning, open to suggestions / feedback, responsible parent, and support of family, friends and providers .     Recommendations:     1. Plan for Safety and Risk Management:   Safety and Risk: Recommended that patient call 911 or go to the local ED should there be a change in any of these risk factors..          Report to child / adult protection services was NA.     2. Patient's identified  no concerns with sexual orientation, gender identity, culture, ethnicity, or margareth .     3. Initial Treatment will focus on:    Anxiety - LINDA  Functional Impairment at: work.     4. Resources/Service Plan:    services are not indicated.   Modifications to assist communication are not indicated.   Additional disability accommodations are not indicated.      5. Collaboration:   Collaboration / coordination of treatment will be initiated with the following  support professionals: primary care physician as clinically indicated.     6.  Referrals:   The following referral(s) will be initiated:  NA .       A Release of Information has been obtained for the following:  NA .     Clinical Substantiation/medical necessity for the above recommendations:  individual therapy is necessary in order to alleviate symptoms and restore functioning.    7. JERRY:    JERRY:  not identified    8. Records:   These were reviewed at time of assessment.   Information in this assessment was obtained from the  medical record and  provided by patient who is a good historian.    Patient will have open access to their mental health medical record.    9.   Interactive Complexity: No      Provider Name/ Credentials:  Richelle Linton MS, [facility]  May 30, 2024

## 2024-06-27 ENCOUNTER — VIRTUAL VISIT (OUTPATIENT)
Dept: PSYCHOLOGY | Facility: CLINIC | Age: 64
End: 2024-06-27
Payer: COMMERCIAL

## 2024-06-27 DIAGNOSIS — F41.1 GAD (GENERALIZED ANXIETY DISORDER): Primary | ICD-10-CM

## 2024-06-27 PROCEDURE — 90834 PSYTX W PT 45 MINUTES: CPT | Mod: 95

## 2024-06-27 NOTE — PROGRESS NOTES
[facility]                                     Progress Note    Patient Name: Zackery Rogers  Date: 06/27/2024         Service Type: Individual      Session Start Time: 831 Session End Time: 914     Session Length: 43    Session #: 5    Attendees: Client attended alone    Service Modality:  Video Visit:      Provider verified identity through the following two step process.  Patient provided:  Patient is known previously to provider    Telemedicine Visit: The patient's condition can be safely assessed and treated via synchronous audio and visual telemedicine encounter.      Reason for Telemedicine Visit: Patient has requested telehealth visit    Originating Site (Patient Location): Patient's home    Distant Site (Provider Location): Provider Remote Setting- Home Office    Consent:  The patient/guardian has verbally consented to: the potential risks and benefits of telemedicine (video visit) versus in person care; bill my insurance or make self-payment for services provided; and responsibility for payment of non-covered services.     Patient would like the video invitation sent by:  My Chart    Mode of Communication:  Video Conference via Amwell    Distant Location (Provider):  Off-site    As the provider I attest to compliance with applicable laws and regulations related to telemedicine.    DATA  Interactive Complexity: No  Crisis: No        Progress Since Last Session (Related to Symptoms / Goals / Homework):   Symptoms: Improving      Homework: completed in session      Episode of Care Goals: Satisfactory progress - ACTION (Actively working towards change); Intervened by reinforcing change plan / affirming steps taken     Current / Ongoing Stressors and Concerns:   Work stress, health concerns, panic attacks     Treatment Objective(s) Addressed in This Session:   Learn and utilize coping strategies for anxiety       Intervention:   Discussion of time out strategy today as well as review  of other coping strategies from prior sessions.  Clarification provided where needed.  Discussion of frequency of sessions.     Assessments completed prior to visit:  The following assessments were completed by patient for this visit:  PHQ9:       9/23/2022     7:10 AM 3/22/2023     7:38 AM 8/22/2023     7:57 AM 4/17/2024    11:33 AM 5/30/2024     6:56 AM   PHQ-9 SCORE   PHQ-9 Total Score MyChart  14 (Moderate depression) 11 (Moderate depression) 13 (Moderate depression) 11 (Moderate depression)   PHQ-9 Total Score 12 14 11 13 11     GAD7:       9/23/2022     7:10 AM 11/2/2022     8:14 AM 3/22/2023     7:43 AM 12/28/2023     7:10 AM 4/17/2024    11:31 AM 5/30/2024     7:23 AM 6/13/2024     9:20 AM   LINDA-7 SCORE   Total Score  6 (mild anxiety) 12 (moderate anxiety) 3 (minimal anxiety) 5 (mild anxiety) 15 (severe anxiety) 11 (moderate anxiety)   Total Score 9 6 12 3 5 15    15 11    11     PROMIS 10-Global Health (all questions and answers displayed):       5/30/2024     7:27 AM 6/13/2024     9:23 AM   PROMIS 10   In general, would you say your health is: Good Good   In general, would you say your quality of life is: Very good Poor   In general, how would you rate your physical health? Good Good   In general, how would you rate your mental health, including your mood and your ability to think? Poor Poor   In general, how would you rate your satisfaction with your social activities and relationships? Fair Poor   In general, please rate how well you carry out your usual social activities and roles Good Good   To what extent are you able to carry out your everyday physical activities such as walking, climbing stairs, carrying groceries, or moving a chair? Completely Mostly   In the past 7 days, how often have you been bothered by emotional problems such as feeling anxious, depressed, or irritable? Often Often   In the past 7 days, how would you rate your fatigue on average? Moderate Moderate   In the past 7 days, how  would you rate your pain on average, where 0 means no pain, and 10 means worst imaginable pain? 10 2   In general, would you say your health is: 3 3   In general, would you say your quality of life is: 4 1   In general, how would you rate your physical health? 3 3   In general, how would you rate your mental health, including your mood and your ability to think? 1 1   In general, how would you rate your satisfaction with your social activities and relationships? 2 1   In general, please rate how well you carry out your usual social activities and roles. (This includes activities at home, at work and in your community, and responsibilities as a parent, child, spouse, employee, friend, etc.) 3 3   To what extent are you able to carry out your everyday physical activities such as walking, climbing stairs, carrying groceries, or moving a chair? 5 4   In the past 7 days, how often have you been bothered by emotional problems such as feeling anxious, depressed, or irritable? 4 4   In the past 7 days, how would you rate your fatigue on average? 3 3   In the past 7 days, how would you rate your pain on average, where 0 means no pain, and 10 means worst imaginable pain? 10 2   Global Mental Health Score 9    9 5    5   Global Physical Health Score 12    12 14    14   PROMIS TOTAL - SUBSCORES 21    21 19    19         ASSESSMENT: Current Emotional / Mental Status (status of significant symptoms):   Risk status (Self / Other harm or suicidal ideation)   Patient denies current fears or concerns for personal safety.   Patient denies current or recent suicidal ideation or behaviors.   Patient denies current or recent homicidal ideation or behaviors.   Patient denies current or recent self injurious behavior or ideation.   Patient denies other safety concerns.   Patient reports there has been no change in risk factors since their last session.     Patient reports there has been no change in protective factors since their last  session.     Recommended that patient call 911 or go to the local ED should there be a change in any of these risk factors.     Appearance:   Appropriate    Eye Contact:   Good    Psychomotor Behavior: Normal    Attitude:   Cooperative    Orientation:   All   Speech    Rate / Production: Normal     Volume:  Normal    Mood:    Normal   Affect:    Appropriate    Thought Content:  Clear    Thought Form:  Coherent  Logical    Insight:    Good      Medication Review:   No changes to current psychiatric medication(s)     Medication Compliance:   Yes     Changes in Health Issues:   None reported     Chemical Use Review:   Substance Use: Chemical use reviewed, no active concerns identified      Tobacco Use: No current tobacco use.      Diagnosis:  1. LINDA (generalized anxiety disorder)            Collateral Reports Completed:   Not Applicable    PLAN: (Patient Tasks / Therapist Tasks / Other)  Return in 3 weeks.  Practice PMR, Utilize other coping strategies.  get appt to establish with PCP        MYCHAL Vinson                                                         ______________________________________________________________________    Individual Treatment Plan    Patient's Name: Zackery Rogers  YOB: 1960    Date of Creation: 06/06/2024  Date Treatment Plan Last Reviewed/Revised: 06/06/2024    DSM5 Diagnoses: 300.02 (F41.1) Generalized Anxiety Disorder  Psychosocial / Contextual Factors: , employed, lives at home with wife and two adult sons  PROMIS (reviewed every 90 days): 530/2024    Referral / Collaboration:  Referral to another professional/service is not indicated at this time.    Anticipated number of session for this episode of care: 9-12 sessions  Anticipation frequency of session: Weeklythen re-evaluate after one month  Anticipated Duration of each session: 38-52 minutes  Treatment plan will be reviewed in 90 days or when goals have been changed.       MeasurableTreatment  Goal(s) related to diagnosis / functional impairment(s)  Goal 1: Patient will decrease anxiety    I will know I've met my goal when I stop having anxiety attacks in stressful situations.      Objective #A (Patient Action)    Patient will use at least 3 coping skills for anxiety management in the next 12 weeks.  Status: New - Date: 2024      Intervention(s)  Therapist will provide psychoeducation, behavioral activation, and cognitive restructuring.    Objective #B  Patient will use cognitive strategies identified in therapy to challenge anxious thoughts.  Status: New - Date: 2024      Intervention(s)  Therapist will provide psychoeducation, behavioral activation, and cognitive restructuring.    Objective #C  Patient will identify three distraction and diversion activities and use those activities to decrease level of anxiety.  Status: New - Date: 2024      Intervention(s)  Therapist will provide psychoeducation, behavioral activation, and cognitive restructuring.    Patient has reviewed and agreed to the above plan.      Richelle Linton [facility]  2024      [facility]       PATIENT'S NAME: Zackery BRANNON Rogers  PREFERRED NAME: Gustavo  PRONOUNS:     he him his  MRN: [id number]  : 1960  ADDRESS: [address] [place] [id number]  ACCT. NUMBER:  [id number]  DATE OF SERVICE: 24  START TIME: 731  END TIME: 820  PREFERRED PHONE: [phone]  May we leave a program related message: Yes  EMERGENCY CONTACT: was obtained David Cole   [phone].  SERVICE MODALITY:  Video Visit:      Provider verified identity through the following two step process.  Patient provided:  Patient  and Patient address    Telemedicine Visit: The patient's condition can be safely assessed and treated via synchronous audio and visual telemedicine encounter.      Reason for Telemedicine Visit: Patient convenience (e.g. access to timely appointments / distance to available  "provider)    Originating Site (Patient Location): Patient's home    Distant Site (Provider Location): Provider Remote Setting- Home Office    Consent:  The patient/guardian has verbally consented to: the potential risks and benefits of telemedicine (video visit) versus in person care; bill my insurance or make self-payment for services provided; and responsibility for payment of non-covered services.     Patient would like the video invitation sent by:  My Chart    Mode of Communication:  Video Conference via Amwell    Distant Location (Provider):  Off-site    As the provider I attest to compliance with applicable laws and regulations related to telemedicine.    UNIVERSAL ADULT Mental Health DIAGNOSTIC ASSESSMENT    Identifying Information:  Patient is a 63 year old,   individual.  Patient was referred for an assessment by primary care clinic.  Patient attended the session alone.    Chief Complaint:   The reason for seeking services at this time is: \"mental heaiyj\".  The problem(s) began 01/01/23.    Patient has attempted to resolve these concerns in the past through Therapy and medication .    Social/Family History:  Patient reported they grew up in [place]  .  They were raised by biological parents  .  Parents were always together.  Patient reported that their childhood was good-some siblings were in college when he was still young .  Patient described their current relationships with family of origin as has 4 siblings older than himself, he has regular contact with them.     The patient describes their cultural background as .  Cultural influences and impact on patient's life structure, values, norms, and healthcare: deonl.  Contextual influences on patient's health include: Contextual Factors: Individual Factors symptoms of anxiety, work stress .    These factors will be addressed in the Preliminary Treatment plan. Patient identified their preferred language to be English. Patient " reported they does not need the assistance of an  or other support involved in therapy.     Patient reported had no significant delays in developmental tasks.   Patient's highest education level was associate degree / vocational certificate  .  Patient identified the following learning problems: none reported.  Modifications will not be used to assist communication in therapy.  Patient reports they are  able to understand written materials.    Patient reported the following relationship history NA.  Patient's current relationship status is  .   Patient identified their sexual orientation as heterosexual.  Patient reported having 2 child(erlinda). Patient identified spouse as part of their support system.  Patient identified the quality of these relationships as good,  .      Patient's current living/housing situation involves staying in own home/apartment.  The immediate members of family and household include raj martinez, [age] and wife and two sons and they report that housing is stable.    Patient is currently employed fulltime.  Patient reports their finances are obtained through employment. Patient does not identify finances as a current stressor.      Patient reported that they have not been involved with the legal system.    . Patient does not report being under probation/ parole/ jurisdiction. They are not under any current court jurisdiction. .    Patient's Strengths and Limitations:  Patient identified the following strengths or resources that will help them succeed in treatment: family support. Things that may interfere with the patient's success in treatment include: none identified.     Assessments:  The following assessments were completed by patient for this visit:  PHQ9:       9/23/2022     7:10 AM 3/22/2023     7:38 AM 8/22/2023     7:57 AM 4/17/2024    11:33 AM 5/30/2024     6:56 AM   PHQ-9 SCORE   PHQ-9 Total Score Jacy  14 (Moderate depression) 11 (Moderate depression) 13  (Moderate depression) 11 (Moderate depression)   PHQ-9 Total Score 12 14 11 13 11     GAD7:       9/23/2022     7:10 AM 11/2/2022     8:14 AM 3/22/2023     7:43 AM 12/28/2023     7:10 AM 4/17/2024    11:31 AM 5/30/2024     7:23 AM 6/13/2024     9:20 AM   LINDA-7 SCORE   Total Score  6 (mild anxiety) 12 (moderate anxiety) 3 (minimal anxiety) 5 (mild anxiety) 15 (severe anxiety) 11 (moderate anxiety)   Total Score 9 6 12 3 5 15    15 11    11     CAGE-AID:       5/30/2024     7:28 AM   CAGE-AID Total Score   Total Score 0   Total Score MyChart 0 (A total score of 2 or greater is considered clinically significant)     PROMIS 10-Global Health (all questions and answers displayed):       5/30/2024     7:27 AM 6/13/2024     9:23 AM   PROMIS 10   In general, would you say your health is: Good Good   In general, would you say your quality of life is: Very good Poor   In general, how would you rate your physical health? Good Good   In general, how would you rate your mental health, including your mood and your ability to think? Poor Poor   In general, how would you rate your satisfaction with your social activities and relationships? Fair Poor   In general, please rate how well you carry out your usual social activities and roles Good Good   To what extent are you able to carry out your everyday physical activities such as walking, climbing stairs, carrying groceries, or moving a chair? Completely Mostly   In the past 7 days, how often have you been bothered by emotional problems such as feeling anxious, depressed, or irritable? Often Often   In the past 7 days, how would you rate your fatigue on average? Moderate Moderate   In the past 7 days, how would you rate your pain on average, where 0 means no pain, and 10 means worst imaginable pain? 10 2   In general, would you say your health is: 3 3   In general, would you say your quality of life is: 4 1   In general, how would you rate your physical health? 3 3   In general, how  would you rate your mental health, including your mood and your ability to think? 1 1   In general, how would you rate your satisfaction with your social activities and relationships? 2 1   In general, please rate how well you carry out your usual social activities and roles. (This includes activities at home, at work and in your community, and responsibilities as a parent, child, spouse, employee, friend, etc.) 3 3   To what extent are you able to carry out your everyday physical activities such as walking, climbing stairs, carrying groceries, or moving a chair? 5 4   In the past 7 days, how often have you been bothered by emotional problems such as feeling anxious, depressed, or irritable? 4 4   In the past 7 days, how would you rate your fatigue on average? 3 3   In the past 7 days, how would you rate your pain on average, where 0 means no pain, and 10 means worst imaginable pain? 10 2   Global Mental Health Score 9    9 5    5   Global Physical Health Score 12    12 14    14   PROMIS TOTAL - SUBSCORES 21    21 19    19     [place] Suicide Severity Rating Scale (Lifetime/Recent)      5/30/2024     7:00 AM   [place] Suicide Severity Rating (Lifetime/Recent)   Q1 Wish to be Dead (Lifetime) N   Q2 Non-Specific Active Suicidal Thoughts (Lifetime) N   Actual Attempt (Lifetime) N   Has subject engaged in non-suicidal self-injurious behavior? (Lifetime) N   Interrupted Attempts (Lifetime) N   Aborted or Self-Interrupted Attempt (Lifetime) N   Preparatory Acts or Behavior (Lifetime) N   Calculated C-SSRS Risk Score (Lifetime/Recent) No Risk Indicated       Personal and Family Medical History:  Patient does not report a family history of mental health concerns.  Patient reports family history includes Alzheimer Disease in his mother; Cerebrovascular Disease in his father; Family History Negative in his brother, brother, brother, and sister; Myocardial Infarction in his mother..     Patient does report Mental Health  Diagnosis and/or Treatment.  Patient Patient reported the following previous diagnoses which include(s): an Anxiety Disorder.  Patient reported symptoms began years ago.   Patient has received mental health services in the past:  therapy about a year ago-was [facility] .  Psychiatric Hospitalizations: None.  Patient denies a history of civil commitment.  Patient is not receiving other mental health services.  These include none.       Patient has had a physical exam to rule out medical causes for current symptoms.  Date of last physical exam was within the past year. Client was encouraged to follow up with PCP if symptoms were to develop. The patient has a [facility] Primary Care Provider, who is named No Ref-Primary, Physician..  Patient reports the following current medical concerns: type 2 diabetes .  Patient denies any issues with pain..   There are not significant appetite / nutritional concerns / weight changes.   Patient does not report a history of head injury / trauma / cognitive impairment.      Patient reports current meds as:   Current Outpatient Medications   Medication Sig Dispense Refill    alcohol swab prep pads Use to swab area of injection/rosalba as directed. (Patient not taking: Reported on 4/17/2024) 100 each 3    aspirin (ASPIRIN LOW DOSE) 81 MG chewable tablet CHEW AND SWALLOW ONE TABLET BY MOUTH ONE TIME DAILY. 90 tablet 0    blood glucose (NO BRAND SPECIFIED) test strip Use to test blood sugar 1 times daily or as directed. To accompany: Blood Glucose Monitor Brands: per insurance. (Patient not taking: Reported on 4/17/2024) 100 strip 6    blood glucose calibration (NO BRAND SPECIFIED) solution To accompany: Blood Glucose Monitor Brands: per insurance. (Patient not taking: Reported on 4/17/2024) 1 each 1    blood glucose monitoring (NO BRAND SPECIFIED) meter device kit Use to test blood sugar 1 times daily or as directed. Preferred blood glucose meter OR supplies to accompany: Blood Glucose Monitor  Brands: per insurance. (Patient not taking: Reported on 4/17/2024) 1 kit 0    hydrOXYzine cande (VISTARIL) 25 MG capsule Take 1 capsule (25 mg) by mouth 3 times daily as needed for anxiety 20 capsule 4    insulin glargine 100 UNIT/ML pen Inject 40 Units Subcutaneous at bedtime - DUE FOR DIABETIC CHECKUP 15 mL 0    insulin pen needle (31G X 6 MM) 31G X 6 MM miscellaneous Use 1 pen needles daily or as directed. (Patient not taking: Reported on 4/17/2024) 100 each 3    lisinopril (ZESTRIL) 40 MG tablet Take 1 tablet (40 mg) by mouth daily - DUE FOR DIABETIC FOLLOW UP 30 tablet 0    metFORMIN (GLUCOPHAGE XR) 500 MG 24 hr tablet Take 2 tablets (1,000 mg) by mouth 2 times daily (with meals) 360 tablet 0    minocycline (MINOCIN) 100 MG capsule Take 1 capsule (100 mg) by mouth 2 times daily 180 capsule 3    ondansetron (ZOFRAN ODT) 4 MG ODT tab Take 1 tablet (4 mg) by mouth every 6 hours as needed for nausea 15 tablet 0    SENNA-docusate sodium (SENNA S) 8.6-50 MG tablet Take 1 tablet by mouth daily as needed 30 tablet 0    sertraline (ZOLOFT) 100 MG tablet Take 1.5 tablets (150 mg) by mouth daily 135 tablet 1    simvastatin (ZOCOR) 20 MG tablet Take 1 tablet (20 mg) by mouth at bedtime - DUE FOR DIABETIC FOLLOW UP 30 tablet 1    thin (NO BRAND SPECIFIED) lancets Use with lanceting device. To accompany: Blood Glucose Monitor Brands: per insurance. (Patient not taking: Reported on 4/17/2024) 100 each 11     No current facility-administered medications for this visit.       Medication Adherence:  Patient reports taking.  taking prescribed medications as prescribed.    Patient Allergies:  No Known Allergies    Medical History:    Past Medical History:   Diagnosis Date    DISH (diffuse idiopathic skeletal hyperostosis)     History of acute pancreatitis 2015    Hypertension     Obesity     Pancreatic mass     Rosacea     Type 2 diabetes mellitus with other specified complication (H)     Type 2 diabetes mellitus without  complication, without long-term current use of insulin (H) 03/07/2019         Current Mental Status Exam:   Appearance:  Appropriate    Eye Contact:  Good   Psychomotor:  Normal       Gait / station:  Not assessed  Attitude / Demeanor: Cooperative  Pleasant  Speech      Rate / Production: Normal/ Responsive      Volume:  Normal  volume      Language:  intact  Mood:   Normal  Affect:   Appropriate    Thought Content: Clear   Thought Process: Coherent       Associations: No loosening of associations  Insight:   Good   Judgment:  Intact   Orientation:  All  Attention/concentration: Good    Substance Use:   Patient did not report a family history of substance use concerns; see medical history section for details.  Patient has not received chemical dependency treatment in the past.  Patient has not ever been to detox.      Patient is not currently receiving any chemical dependency treatment.           Substance History of use Age of first use Date of last use     Pattern and duration of use (include amounts and frequency)   Alcohol used in the past   15 01/12/24 REPORTS SUBSTANCE USE: N/A   Cannabis   used in the past 15 05/20/23 REPORTS SUBSTANCE USE: N/A     Amphetamines   never used     REPORTS SUBSTANCE USE: N/A   Cocaine/crack    never used       REPORTS SUBSTANCE USE: N/A   Hallucinogens never used         REPORTS SUBSTANCE USE: N/A   Inhalants never used         REPORTS SUBSTANCE USE: N/A   Heroin never used         REPORTS SUBSTANCE USE: N/A   Other Opiates never used     REPORTS SUBSTANCE USE: N/A   Benzodiazepine   never used     REPORTS SUBSTANCE USE: N/A   Barbiturates never used     REPORTS SUBSTANCE USE: N/A   Over the counter meds used in the past 9 01/10/23 REPORTS SUBSTANCE USE: N/A   Caffeine currently use 5   Reports 3-4 cans of soda per day.   Nicotine  never used     REPORTS SUBSTANCE USE: N/A   Other substances not listed above:  Identify:  never used     REPORTS SUBSTANCE USE: N/A     Patient  reported the following problems as a result of their substance use: None    Substance Use: No symptoms    Based on the negative CAGE score and clinical interview there  are not indications of drug or alcohol abuse.    Significant Losses / Trauma / Abuse / Neglect Issues:   Patient did not  serve in the .  There are indications or report of significant loss, trauma, abuse or neglect issues related to: are no indications and client denies any losses, trauma, abuse, or neglect concerns.  Concerns for possible neglect are not present.     Safety Assessment:   Patient denies current homicidal ideation and behaviors.  Patient denies current self-injurious ideation and behaviors.    Patient denied risk behaviors associated with substance use.   Patient denies any high risk behaviors associated with mental health symptoms.  Patient reports the following current concerns for their personal safety: None.  Patient reports there are firearms in the house.     yes, they are secured. The firearms are secured in a locked space.    History of Safety Concerns:  Patient denied a history of homicidal ideation.     Patient denied a history of personal safety concerns.    Patient denied a history of assaultive behaviors.    Patient denied a history of sexual assault behaviors.     Patient denied a history of risk behaviors associated with substance use.  Patient denies any history of high risk behaviors associated with mental health symptoms.  Patient reports the following protective factors: dedication to family or friends; sense of personal control or determination    Risk Plan:  See Recommendations for Safety and Risk Management Plan    Review of Symptoms per patient report:   Depression: Change in sleep, Lack of interest, Excessive or inappropriate guilt, Difficulties concentrating, Feelings of hopelessness, Feelings of helplessness, Irritability, Feeling sad, down, or depressed, and Anger outbursts  Corry:  No  Symptoms  Psychosis: No Symptoms  Anxiety: Excessive worry, Nervousness, Physical complaints, such as headaches, stomachaches, muscle tension, Poor concentration, Irritability, and Anger outbursts  Panic:  Client reports prolonged panic at work  Post Traumatic Stress Disorder:  No Symptoms   Eating Disorder: No Symptoms  ADD / ADHD:  No symptoms  Conduct Disorder: No symptoms  Autism Spectrum Disorder: No symptoms  Obsessive Compulsive Disorder: No Symptoms    Patient reports the following compulsive behaviors and treatment history:  None .      Diagnostic Criteria:   Generalized Anxiety Disorder  A. Excessive anxiety and worry about a number of events or activities (such as work or school performance).   B. The person finds it difficult to control the worry.  C. Select 3 or more symptoms (required for diagnosis). Only one item is required in children.   - Restlessness or feeling keyed up or on edge.    - Being easily fatigued.    - Difficulty concentrating or mind going blank.    - Irritability.    - Sleep disturbance (difficulty falling or staying asleep, or restless unsatisfying sleep).   D. The focus of the anxiety and worry is not confined to features of an Axis I disorder.  E. The anxiety, worry, or physical symptoms cause clinically significant distress or impairment in social, occupational, or other important areas of functioning.   F. The disturbance is not due to the direct physiological effects of a substance (e.g., a drug of abuse, a medication) or a general medical condition (e.g., hyperthyroidism) and does not occur exclusively during a Mood Disorder, a Psychotic Disorder, or a Pervasive Developmental Disorder.    - The aformentioned symptoms began several year(s) ago and occurs 7 days per week and is experienced as severe.    Functional Status:  Patient reports the following functional impairments:  work / vocational responsibilities.     Nonprogrammatic care:  Patient is requesting basic services to  address current mental health concerns.    Clinical Summary:  1. Psychosocial, Cultural and Contextual Factors: , employed, lives in own home with wife and two adult sons  .  2. Principal DSM5 Diagnoses  (Sustained by DSM5 Criteria Listed Above):   300.02 (F41.1) Generalized Anxiety Disorder.  3. Other Diagnoses that is relevant to services:   NA  4. Provisional Diagnosis:  NA  5. Prognosis: Expect Improvement and Relieve Acute Symptoms.  6. Likely consequences of symptoms if not treated: worsening anxiety.  7. Client strengths include:  employed, goal-focused, has a previous history of therapy, motivated, open to learning, open to suggestions / feedback, responsible parent, and support of family, friends and providers .     Recommendations:     1. Plan for Safety and Risk Management:   Safety and Risk: Recommended that patient call 911 or go to the local ED should there be a change in any of these risk factors..          Report to child / adult protection services was NA.     2. Patient's identified  no concerns with sexual orientation, gender identity, culture, ethnicity, or margareth .     3. Initial Treatment will focus on:    Anxiety - LINDA  Functional Impairment at: work.     4. Resources/Service Plan:    services are not indicated.   Modifications to assist communication are not indicated.   Additional disability accommodations are not indicated.      5. Collaboration:   Collaboration / coordination of treatment will be initiated with the following  support professionals: primary care physician as clinically indicated.     6.  Referrals:   The following referral(s) will be initiated:  NA .       A Release of Information has been obtained for the following:  NA .     Clinical Substantiation/medical necessity for the above recommendations:  individual therapy is necessary in order to alleviate symptoms and restore functioning.    7. JERRY:    JERRY:  not identified    8. Records:   These were reviewed  at time of assessment.   Information in this assessment was obtained from the medical record and  provided by patient who is a good historian.    Patient will have open access to their mental health medical record.    9.   Interactive Complexity: No      Provider Name/ Credentials:  Richelle Linton MS, [facility]  May 30, 2024

## 2024-07-01 ENCOUNTER — ALLIED HEALTH/NURSE VISIT (OUTPATIENT)
Dept: EDUCATION SERVICES | Facility: CLINIC | Age: 64
End: 2024-07-01
Attending: PHYSICIAN ASSISTANT
Payer: COMMERCIAL

## 2024-07-01 DIAGNOSIS — N20.0 CALCULUS OF KIDNEY: ICD-10-CM

## 2024-07-01 DIAGNOSIS — Z79.4 TYPE 2 DIABETES MELLITUS WITH HYPERGLYCEMIA, WITH LONG-TERM CURRENT USE OF INSULIN (H): ICD-10-CM

## 2024-07-01 DIAGNOSIS — Z87.19 HISTORY OF CHRONIC PANCREATITIS: ICD-10-CM

## 2024-07-01 DIAGNOSIS — E11.65 TYPE 2 DIABETES MELLITUS WITH HYPERGLYCEMIA, WITH LONG-TERM CURRENT USE OF INSULIN (H): ICD-10-CM

## 2024-07-01 PROCEDURE — G0108 DIAB MANAGE TRN  PER INDIV: HCPCS

## 2024-07-01 RX ORDER — ACYCLOVIR 400 MG/1
1 TABLET ORAL
Qty: 3 EACH | Refills: 11 | Status: SHIPPED | OUTPATIENT
Start: 2024-07-01 | End: 2024-07-22

## 2024-07-01 NOTE — PROGRESS NOTES
DIABETES SELF MANAGEMENT EDUCATION: Previous Diagnosis/Individual Diabetes Review    Zackery Rogers presents today for education and initiation of continuous glucose monitoring related to Type 2 diabetes.  He is accompanied by self    Year of diagnosis: circa 2018  Referring provider:  Marek Medina PA-C    Past Diabetes Education: Yes  Social History: lives with his wife who is a traveling nurse. 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    DIABETES RELATED CONCERNS/COMMENTS: Most recent A1C is 10.9%.  Gustavo tests his blood sugars 1-2 times per week. He is interested in a continuous glucose monitor. His phone is not compatible with the Regalii 3 continuous glucose monitor so we'll start him on the Dexcom G7 continuous glucose monitor. Free sample given and sent prescription to [facility] in [place].    ASSESSMENT:  Patient Problem List and Medication List reviewed for relevant medical history, current medical status, and diabetes risk factors.    MEDICATION:    Current Diabetes Management per Patient:  Taking diabetes medications? yes:   Denies missing doses  Diabetes Medication(s)       Biguanides       metFORMIN (GLUCOPHAGE XR) 500 MG 24 hr tablet Take 2 tablets (1,000 mg) by mouth 2 times daily (with meals)       Insulin       insulin glargine 100 UNIT/ML pen Inject 40 Units Subcutaneous at bedtime - DUE FOR DIABETIC CHECKUP            MONITORING:  Testing 1-2 times per week. He can't recall his average blood sugars.      Patient's most recent   Lab Results   Component Value Date    A1C 10.9 04/02/2024    A1C 6.1 12/28/2020      Patient's A1C goal: <8.0    PHYSICAL ACTIVITY:  no regular exercise program    NUTRITION:  Patient typically skips breakfast, snacks on nuts. Has a sandwich for lunch or occasionally eats fast food. Otherwise leftovers. Supper is light as his wife is usually working.  Beverages: 4-5 diet sodas  Cultural/Cheondoism diet restrictions: No   Biggest Challenge to Healthy Eating: knowing  what to eat    Diabetes knowledge and skills assessment:   Barriers to Learning Assessment: No Barriers identified    Problem Solving:    Patient is at risk of hypoglycemia?: NO  Hospitalizations for hyper or hypoglycemia: No    Healthy Coping and Stress Management:   Sources of stress identified by patient My health    EDUCATION and INSTRUCTION PROVIDED AT THIS VISIT:    -Basic pathophysiology of diabetes, relationship between carbohydrate intake, release of glucose from the liver, exercise and weight management on glucose control.   -Target blood glucose for pre-meal and 2 hour post-prandial glucose   -Action, timing and potential side-effects of Metformin and Lantus diabetes medications:   -Basic meal planning using the plate method and carb counting, emphasizing portion control, healthy food choices and eliminating or minimizing sugared beverages.   -Need for consistent physical activity, 30-45 minutes duration, preferably 4-6 days per week.    -Dexcom CGM system - Dexcom sensor, transmitter, , Dexcom mobile applications, sensor glucose versus blood glucose, trends and graphs, alarms and alerts, Dexcom sensor insertion, calibrating, stopping sensor and changing sensor, Dexcom Clarity software      Pt verbalized understanding of concepts discussed and recommendations provided today.       PLAN:  I will see Gustavo back for a video visit in 3 weeks. Will review Clarity rajesh and most likely will need SGLT-2 or meal time insulin. May start with Novolog with largest meal.  We went over plate method for diet. Doubt he would commit to carb counting at this time.     FOLLOW-UP:    Follow-up appointment scheduled on July 22nd.  Chart routed to referring provider.    Time Spent: 70 minutes  Encounter Type: Individual    Any diabetes medication dose changes were made via the CDE Protocol and Collaborative Practice Agreement with [place] and  [facility].  A copy of this encounter was provided to patient's referring  provider.    Thalia Cruz RN, [facility]

## 2024-07-15 DIAGNOSIS — I10 ESSENTIAL HYPERTENSION: ICD-10-CM

## 2024-07-15 DIAGNOSIS — E78.2 MIXED HYPERLIPIDEMIA: ICD-10-CM

## 2024-07-15 RX ORDER — SIMVASTATIN 20 MG
20 TABLET ORAL AT BEDTIME
Qty: 30 TABLET | Refills: 0 | Status: SHIPPED | OUTPATIENT
Start: 2024-07-15 | End: 2024-08-13

## 2024-07-15 RX ORDER — LISINOPRIL 40 MG/1
40 TABLET ORAL DAILY
Qty: 30 TABLET | Refills: 0 | Status: SHIPPED | OUTPATIENT
Start: 2024-07-15 | End: 2024-08-13

## 2024-07-22 ENCOUNTER — TELEPHONE (OUTPATIENT)
Dept: FAMILY MEDICINE | Facility: OTHER | Age: 64
End: 2024-07-22

## 2024-07-22 ENCOUNTER — VIRTUAL VISIT (OUTPATIENT)
Dept: EDUCATION SERVICES | Facility: CLINIC | Age: 64
End: 2024-07-22
Payer: COMMERCIAL

## 2024-07-22 VITALS — WEIGHT: 220 LBS | BODY MASS INDEX: 31.57 KG/M2

## 2024-07-22 DIAGNOSIS — E11.65 TYPE 2 DIABETES MELLITUS WITH HYPERGLYCEMIA, WITH LONG-TERM CURRENT USE OF INSULIN (H): Primary | ICD-10-CM

## 2024-07-22 DIAGNOSIS — Z79.4 TYPE 2 DIABETES MELLITUS WITH HYPERGLYCEMIA, WITH LONG-TERM CURRENT USE OF INSULIN (H): Primary | ICD-10-CM

## 2024-07-22 DIAGNOSIS — Z79.4 TYPE 2 DIABETES MELLITUS WITH HYPERGLYCEMIA, WITH LONG-TERM CURRENT USE OF INSULIN (H): ICD-10-CM

## 2024-07-22 DIAGNOSIS — E11.65 TYPE 2 DIABETES MELLITUS WITH HYPERGLYCEMIA, WITH LONG-TERM CURRENT USE OF INSULIN (H): ICD-10-CM

## 2024-07-22 PROCEDURE — G0108 DIAB MANAGE TRN  PER INDIV: HCPCS | Mod: 95

## 2024-07-22 NOTE — PROGRESS NOTES
Virtual Visit Details    Type of service:  Video Visit     Originating Location (pt. Location): Home    Distant Location (provider location):  Off-site  Platform used for Video Visit: [employer]    DIABETES SELF MANAGEMENT EDUCATION: Previous Diagnosis/Individual Diabetes Review    Zackery Rogers presents today for education and initiation of continuous glucose monitoring related to Type 2 diabetes.  He is accompanied by self    Year of diagnosis: circa 2018  Referring provider:  Marek Medina PA-C    Past Diabetes Education: Yes  Social History: lives with his wife who is a traveling nurse. 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    DIABETES RELATED CONCERNS/COMMENTS: Most recent A1C is 10.9%.  Gustavo tests his blood sugars 1-2 times per week. He was given a free sample Dexcom G7 continuous glucose monitor. His insurance did not cover the Dexcom G7 continuous glucose monitor so he only wore the Dexcom G7 continuous glucose monitor for 10 days during the free sample. His phone is not compatible with the Jim 3 continuous glucose monitor so we'll try for the Jim 2 continuous glucose monitor. We may want to consider cash pay if it's not covered.    ASSESSMENT:  Patient Problem List and Medication List reviewed for relevant medical history, current medical status, and diabetes risk factors.    MEDICATION:    Current Diabetes Management per Patient:  Taking diabetes medications? yes:   Denies missing doses  Diabetes Medication(s)       Biguanides       metFORMIN (GLUCOPHAGE XR) 500 MG 24 hr tablet Take 2 tablets (1,000 mg) by mouth 2 times daily (with meals)       Insulin       insulin glargine 100 UNIT/ML pen Inject 40 Units Subcutaneous at bedtime - DUE FOR DIABETIC CHECKUP            MONITORING:                  Patient's most recent   Lab Results   Component Value Date    A1C 10.9 04/02/2024    A1C 6.1 12/28/2020      Patient's A1C goal: <8.0    PHYSICAL ACTIVITY:  no regular exercise  program    NUTRITION:  Patient typically skips breakfast, snacks on nuts. Has a sandwich for lunch or occasionally eats fast food. Otherwise leftovers. Supper is light as his wife is usually working.  Beverages: 4-5 diet sodas  Cultural/Alevism diet restrictions: No   Biggest Challenge to Healthy Eating: knowing what to eat    Diabetes knowledge and skills assessment:   Barriers to Learning Assessment: No Barriers identified    Problem Solving:    Patient is at risk of hypoglycemia?: NO  Hospitalizations for hyper or hypoglycemia: No    Healthy Coping and Stress Management:   Sources of stress identified by patient My health    EDUCATION and INSTRUCTION PROVIDED AT THIS VISIT:    -Basic pathophysiology of diabetes, relationship between carbohydrate intake, release of glucose from the liver, exercise and weight management on glucose control.   -Target blood glucose for pre-meal and 2 hour post-prandial glucose   -Action, timing and potential side-effects of Metformin and Lantus diabetes medications:   -Basic meal planning using the plate method and carb counting, emphasizing portion control, healthy food choices and eliminating or minimizing sugared beverages.   -Need for consistent physical activity, 30-45 minutes duration, preferably 4-6 days per week.      Pt verbalized understanding of concepts discussed and recommendations provided today.       PLAN:  Zackery is doing a great job backing off on the sweets. He will continue to work on the evening meal to get protein and veggies with a small carb.   Post meal blood sugars are high especially in the evening. Will check with Marek Medina PA-C if okay to start Jardiance. If this is not well covered he may need meal time insulin at supper.  Given his history of pancreatitis will avoid GLP-1 and try an SGLT-2  I asked him to limit his diet sodas to two per day and try and get a minimum of 40 oz water per day.  Will meet back in person to the [place] office on  Sept 12 and do another A1C.     FOLLOW-UP:    Follow-up appointment scheduled on Sept 12  Chart routed to referring provider.    Time Spent: 40 minutes  Encounter Type: Individual    Any diabetes medication dose changes were made via the CDE Protocol and Collaborative Practice Agreement with [place] and  [facility].  A copy of this encounter was provided to patient's referring provider.    Thalia Cruz RN, [facility]

## 2024-07-22 NOTE — TELEPHONE ENCOUNTER
PA Initiation - Key: [id number] - PA Case ID #: [id number]    Medication: FREESTYLE MARYLOU 2 SENSOR [facility]  Insurance Company: [employer] ([facility]) - Phone [phone] Fax [phone]  Pharmacy Filling the Rx: [id number] - [place], [place] - [address] [place] [address]  Filling Pharmacy Phone: [phone]  Filling Pharmacy Fax: [phone]  Start Date: 7/22/2024

## 2024-07-22 NOTE — TELEPHONE ENCOUNTER
PRIOR AUTHORIZATION DENIED    Medication: FREESTYLE MARYLOU 2 SENSOR [facility]  Insurance Company: [employer] ([facility]) - Phone [phone] Fax [phone]  Denial Date: 7/22/2024  Denial Reason(s):     Appeal Information:     Patient Notified: Clinic to discuss next steps

## 2024-07-22 NOTE — LETTER
7/22/2024      Zackery Rogers  [address] [facility] [address]      Dear Colleague,    Thank you for referring your patient, Zackery Rogers, to the [facility]. Please see a copy of my visit note below.    Virtual Visit Details    Type of service:  Video Visit     Originating Location (pt. Location): Home    Distant Location (provider location):  Off-site  Platform used for Video Visit: [employer]    DIABETES SELF MANAGEMENT EDUCATION: Previous Diagnosis/Individual Diabetes Review    Zackery Rogers presents today for education and initiation of continuous glucose monitoring related to Type 2 diabetes.  He is accompanied by self    Year of diagnosis: circa 2018  Referring provider:  Marek Medina PA-C    Past Diabetes Education: Yes  Social History: lives with his wife who is a traveling nurse. 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    DIABETES RELATED CONCERNS/COMMENTS: Most recent A1C is 10.9%.  Gustavo tests his blood sugars 1-2 times per week. He was given a free sample Dexcom G7 continuous glucose monitor. His insurance did not cover the Dexcom G7 continuous glucose monitor so he only wore the Dexcom G7 continuous glucose monitor for 10 days during the free sample. His phone is not compatible with the Jim 3 continuous glucose monitor so we'll try for the Jim 2 continuous glucose monitor. We may want to consider cash pay if it's not covered.    ASSESSMENT:  Patient Problem List and Medication List reviewed for relevant medical history, current medical status, and diabetes risk factors.    MEDICATION:    Current Diabetes Management per Patient:  Taking diabetes medications? yes:   Denies missing doses  Diabetes Medication(s)       Biguanides       metFORMIN (GLUCOPHAGE XR) 500 MG 24 hr tablet Take 2 tablets (1,000 mg) by mouth 2 times daily (with meals)       Insulin       insulin glargine 100 UNIT/ML pen Inject 40 Units Subcutaneous at bedtime - DUE FOR DIABETIC  CHECKUP            MONITORING:                  Patient's most recent   Lab Results   Component Value Date    A1C 10.9 04/02/2024    A1C 6.1 12/28/2020      Patient's A1C goal: <8.0    PHYSICAL ACTIVITY:  no regular exercise program    NUTRITION:  Patient typically skips breakfast, snacks on nuts. Has a sandwich for lunch or occasionally eats fast food. Otherwise leftovers. Supper is light as his wife is usually working.  Beverages: 4-5 diet sodas  Cultural/Religion diet restrictions: No   Biggest Challenge to Healthy Eating: knowing what to eat    Diabetes knowledge and skills assessment:   Barriers to Learning Assessment: No Barriers identified    Problem Solving:    Patient is at risk of hypoglycemia?: NO  Hospitalizations for hyper or hypoglycemia: No    Healthy Coping and Stress Management:   Sources of stress identified by patient My health    EDUCATION and INSTRUCTION PROVIDED AT THIS VISIT:    -Basic pathophysiology of diabetes, relationship between carbohydrate intake, release of glucose from the liver, exercise and weight management on glucose control.   -Target blood glucose for pre-meal and 2 hour post-prandial glucose   -Action, timing and potential side-effects of Metformin and Lantus diabetes medications:   -Basic meal planning using the plate method and carb counting, emphasizing portion control, healthy food choices and eliminating or minimizing sugared beverages.   -Need for consistent physical activity, 30-45 minutes duration, preferably 4-6 days per week.      Pt verbalized understanding of concepts discussed and recommendations provided today.       PLAN:  Zackery is doing a great job backing off on the sweets. He will continue to work on the evening meal to get protein and veggies with a small carb.   Post meal blood sugars are high especially in the evening. Will check with Marek Medina PA-C if okay to start Jardiance. If this is not well covered he may need meal time insulin at  supper.  Given his history of pancreatitis will avoid GLP-1 and try an SGLT-2  I asked him to limit his diet sodas to two per day and try and get a minimum of 40 oz water per day.  Will meet back in person to the [place] office on Sept 12 and do another A1C.     FOLLOW-UP:    Follow-up appointment scheduled on Sept 12  Chart routed to referring provider.    Time Spent: 40 minutes  Encounter Type: Individual    Any diabetes medication dose changes were made via the CDE Protocol and Collaborative Practice Agreement with [place] and [facility].  A copy of this encounter was provided to patient's referring provider.    Thalia Cruz RN, [facility]       Again, thank you for allowing me to participate in the care of your patient.        Sincerely,        Jennifer Cruz RN

## 2024-07-25 ENCOUNTER — TELEPHONE (OUTPATIENT)
Dept: EDUCATION SERVICES | Facility: CLINIC | Age: 64
End: 2024-07-25
Payer: COMMERCIAL

## 2024-07-25 DIAGNOSIS — Z79.4 TYPE 2 DIABETES MELLITUS WITH HYPERGLYCEMIA, WITH LONG-TERM CURRENT USE OF INSULIN (H): ICD-10-CM

## 2024-07-25 DIAGNOSIS — E11.65 TYPE 2 DIABETES MELLITUS WITH HYPERGLYCEMIA, WITH LONG-TERM CURRENT USE OF INSULIN (H): ICD-10-CM

## 2024-07-31 ENCOUNTER — TELEPHONE (OUTPATIENT)
Dept: EDUCATION SERVICES | Facility: CLINIC | Age: 64
End: 2024-07-31
Payer: COMMERCIAL

## 2024-07-31 DIAGNOSIS — E11.9 TYPE 2 DIABETES MELLITUS WITHOUT COMPLICATION, WITHOUT LONG-TERM CURRENT USE OF INSULIN (H): ICD-10-CM

## 2024-07-31 NOTE — TELEPHONE ENCOUNTER
[facility] Call Center    Phone Message    May a detailed message be left on voicemail: no     Reason for Call: Medication Question or concern regarding medication   Prescription Clarification  Name of Medication: basaglar insulin  Prescribing Provider: anais   Pharmacy:   [id number] - [place] - [address] [place] [address].      What on the order needs clarification? Pharmacy called requesting the above script be sent to them

## 2024-08-01 ENCOUNTER — TELEPHONE (OUTPATIENT)
Dept: FAMILY MEDICINE | Facility: OTHER | Age: 64
End: 2024-08-01
Payer: COMMERCIAL

## 2024-08-01 RX ORDER — INSULIN GLARGINE 100 [IU]/ML
40 INJECTION, SOLUTION SUBCUTANEOUS AT BEDTIME
Qty: 45 ML | Refills: 2 | Status: SHIPPED | OUTPATIENT
Start: 2024-08-01

## 2024-08-01 NOTE — TELEPHONE ENCOUNTER
Patient Quality Outreach    Patient is due for the following:   Diabetes -  A1C    Next Steps:   Patient has an order for future A1C to be done.    Type of outreach:    Chart review performed, no outreach needed.      Questions for provider review:    None           Jenna Bhandari, CMA

## 2024-08-13 DIAGNOSIS — I10 ESSENTIAL HYPERTENSION: ICD-10-CM

## 2024-08-13 DIAGNOSIS — E78.2 MIXED HYPERLIPIDEMIA: ICD-10-CM

## 2024-08-13 RX ORDER — LISINOPRIL 40 MG/1
40 TABLET ORAL DAILY
Qty: 90 TABLET | Refills: 1 | Status: SHIPPED | OUTPATIENT
Start: 2024-08-13

## 2024-08-13 RX ORDER — SIMVASTATIN 20 MG
20 TABLET ORAL AT BEDTIME
Qty: 90 TABLET | Refills: 1 | Status: SHIPPED | OUTPATIENT
Start: 2024-08-13

## 2024-09-12 ENCOUNTER — ALLIED HEALTH/NURSE VISIT (OUTPATIENT)
Dept: EDUCATION SERVICES | Facility: CLINIC | Age: 64
End: 2024-09-12
Payer: COMMERCIAL

## 2024-09-12 ENCOUNTER — LAB (OUTPATIENT)
Dept: LAB | Facility: CLINIC | Age: 64
End: 2024-09-12
Payer: COMMERCIAL

## 2024-09-12 DIAGNOSIS — E11.65 TYPE 2 DIABETES MELLITUS WITH HYPERGLYCEMIA, WITH LONG-TERM CURRENT USE OF INSULIN (H): ICD-10-CM

## 2024-09-12 DIAGNOSIS — Z79.4 TYPE 2 DIABETES MELLITUS WITH HYPERGLYCEMIA, WITH LONG-TERM CURRENT USE OF INSULIN (H): ICD-10-CM

## 2024-09-12 DIAGNOSIS — E11.9 TYPE 2 DIABETES MELLITUS WITHOUT COMPLICATION, WITHOUT LONG-TERM CURRENT USE OF INSULIN (H): ICD-10-CM

## 2024-09-12 LAB — HBA1C MFR BLD: 7.8 % (ref 0–5.6)

## 2024-09-12 PROCEDURE — 36415 COLL VENOUS BLD VENIPUNCTURE: CPT

## 2024-09-12 PROCEDURE — 83036 HEMOGLOBIN GLYCOSYLATED A1C: CPT

## 2024-09-12 PROCEDURE — 99207 PR NO CHARGE LOS: CPT

## 2024-09-12 RX ORDER — METFORMIN HCL 500 MG
1000 TABLET, EXTENDED RELEASE 24 HR ORAL 2 TIMES DAILY WITH MEALS
Qty: 360 TABLET | Refills: 3 | Status: SHIPPED | OUTPATIENT
Start: 2024-09-12

## 2024-09-12 NOTE — PROGRESS NOTES
DIABETES SELF MANAGEMENT EDUCATION: Previous Diagnosis/Individual Diabetes Review    Zackery Rogers presents today for education related to Type 2 diabetes.  He is accompanied by self    Year of diagnosis: circa 2018  Referring provider:  Marek Medina PA-C    Past Diabetes Education: Yes  Social History: lives with his wife who is a traveling nurse. 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    DIABETES RELATED CONCERNS/COMMENTS: Zackery wore a Dexcom G7 continuous glucose monitor for 10 days. He tried to  a prescription for Dexcom G7 continuous glucose monitor or Jim 2 sensor but it was cost prohibitive.   He tests his blood sugars every 1-2 days in the evening before he takes his Lantus and he says it's usually between 120-140.  He was prescribed Jardiance but he said that medication was too expensive so he is not on that.  Gustavo is looking into Medicare as he qualifies next month.    ASSESSMENT:  Patient Problem List and Medication List reviewed for relevant medical history, current medical status, and diabetes risk factors.    MEDICATION:    Current Diabetes Management per Patient:  Taking diabetes medications? yes:   Denies missing doses  Diabetes Medication(s)       Biguanides       metFORMIN (GLUCOPHAGE XR) 500 MG 24 hr tablet Take 2 tablets (1,000 mg) by mouth 2 times daily (with meals).       Insulin       insulin glargine 100 UNIT/ML pen Inject 40 Units subcutaneously at bedtime            MONITORING:  Testing 1-2 times per week. He can't recall his average blood sugars.      Patient's most recent   Lab Results   Component Value Date    A1C 10.9 04/02/2024    A1C 6.1 12/28/2020      Patient's A1C goal: <8.0    PHYSICAL ACTIVITY:  no regular exercise program    NUTRITION:  Patient typically skips breakfast, snacks on nuts. Has a sandwich for lunch or occasionally eats fast food. Otherwise leftovers. Supper is light as his wife is usually working.  Beverages: 4-5 diet  sodas  Cultural/Zoroastrian diet restrictions: No   Biggest Challenge to Healthy Eating: knowing what to eat    Diabetes knowledge and skills assessment:   Barriers to Learning Assessment: No Barriers identified    Problem Solving:    Patient is at risk of hypoglycemia?: NO  Hospitalizations for hyper or hypoglycemia: No    Healthy Coping and Stress Management:   Sources of stress identified by patient My health    EDUCATION and INSTRUCTION PROVIDED AT THIS VISIT:    -Basic pathophysiology of diabetes, relationship between carbohydrate intake, release of glucose from the liver, exercise and weight management on glucose control.   -Target blood glucose for pre-meal and 2 hour post-prandial glucose   -Action, timing and potential side-effects of Metformin and Lantus diabetes medications:   -Basic meal planning using the plate method and carb counting, emphasizing portion control, healthy food choices and eliminating or minimizing sugared beverages.   -Need for consistent physical activity, 30-45 minutes duration, preferably 4-6 days per week.    -Dexcom CGM system - Dexcom sensor, transmitter, , Dexcom mobile applications, sensor glucose versus blood glucose, trends and graphs, alarms and alerts, Dexcom sensor insertion, calibrating, stopping sensor and changing sensor, Dexcom Clarity software      Pt verbalized understanding of concepts discussed and recommendations provided today.       PLAN:  Discussed yearly eye exams, foot care today. No issues at this time. He had his A1C drawn which is still pending.  He will call if he wants a prescription for a continuous glucose monitor if he gets on Medicare.    FOLLOW-UP:    PRN  Chart routed to referring provider.    Time Spent: 25 minutes  Encounter Type: Individual    Any diabetes medication dose changes were made via the CDE Protocol and Collaborative Practice Agreement with [facility] and  [facility].  A copy of this encounter was provided to patient's referring  provider.    Thalia Cruz RN, [facility]

## 2024-09-13 DIAGNOSIS — Z29.9 PREVENTIVE MEASURE: ICD-10-CM

## 2024-09-13 DIAGNOSIS — L71.9 ROSACEA: ICD-10-CM

## 2024-09-13 RX ORDER — MINOCYCLINE HYDROCHLORIDE 100 MG/1
100 CAPSULE ORAL 2 TIMES DAILY
Qty: 180 CAPSULE | Refills: 0 | Status: SHIPPED | OUTPATIENT
Start: 2024-09-13

## 2024-09-13 RX ORDER — ASPIRIN 81 MG/1
TABLET, CHEWABLE ORAL
Qty: 90 TABLET | Refills: 0 | Status: SHIPPED | OUTPATIENT
Start: 2024-09-13

## 2024-10-01 NOTE — PATIENT INSTRUCTIONS
Pt left message that he never received his prep letter for the [facility] prep- letter was mailed on 9/19 & 9/27, not sure what happened. Left message for pt advising that he go to a [facility] and have a staff message go to his chart, into letter and print the GI info for him.   Start lisinopril-hydrochlorothiazide combination pill for blood pressure two tablets daily.    Colonoscopy - You will receive a call to set up an appointment     Refills of minocycline sent.    PITER MendezC

## 2024-10-30 ENCOUNTER — OFFICE VISIT (OUTPATIENT)
Dept: FAMILY MEDICINE | Facility: CLINIC | Age: 64
End: 2024-10-30
Payer: COMMERCIAL

## 2024-10-30 VITALS
RESPIRATION RATE: 18 BRPM | DIASTOLIC BLOOD PRESSURE: 84 MMHG | HEART RATE: 81 BPM | BODY MASS INDEX: 32.13 KG/M2 | WEIGHT: 224.4 LBS | OXYGEN SATURATION: 95 % | HEIGHT: 70 IN | TEMPERATURE: 98.3 F | SYSTOLIC BLOOD PRESSURE: 132 MMHG

## 2024-10-30 DIAGNOSIS — M72.2 PLANTAR FASCIITIS: Primary | ICD-10-CM

## 2024-10-30 DIAGNOSIS — E11.9 TYPE 2 DIABETES MELLITUS WITHOUT COMPLICATION, WITH LONG-TERM CURRENT USE OF INSULIN (H): ICD-10-CM

## 2024-10-30 DIAGNOSIS — Z79.4 TYPE 2 DIABETES MELLITUS WITHOUT COMPLICATION, WITH LONG-TERM CURRENT USE OF INSULIN (H): ICD-10-CM

## 2024-10-30 PROCEDURE — G2211 COMPLEX E/M VISIT ADD ON: HCPCS | Performed by: INTERNAL MEDICINE

## 2024-10-30 PROCEDURE — 99207 PR FOOT EXAM NO CHARGE: CPT | Performed by: INTERNAL MEDICINE

## 2024-10-30 PROCEDURE — 99213 OFFICE O/P EST LOW 20 MIN: CPT | Performed by: INTERNAL MEDICINE

## 2024-10-30 ASSESSMENT — PAIN SCALES - GENERAL: PAINLEVEL_OUTOF10: SEVERE PAIN (7)

## 2024-10-30 NOTE — PROGRESS NOTES
Assessment & Plan     Gustavo was seen today for musculoskeletal problem.    Diagnoses and all orders for this visit:    Plantar fasciitis  Comments:  Review this diagnosis, give information about home treatment, plantar fasciitis exercise.  Recommended good arch support shoes. see AVS    Type 2 diabetes mellitus without complication, with long-term current use of insulin (H)  Comments:  A1c is reasonable.  Foot exam is normal.  No diabetes neuropathy  Orders:  -     FOOT EXAM    Other orders  -     REVIEW OF HEALTH MAINTENANCE PROTOCOL ORDERS  -     PRIMARY CARE FOLLOW-UP SCHEDULING; Future      Follow-up with PCP if no improvement.  Can consider referral to podiatry.          Subjective   Gustavo is a 64 year old, presenting for the following health issues:  Musculoskeletal Problem (Bilateral Foot Pain, started on Right foot, and moved to Left.  Right is not as painful as Left at the moment. )        10/30/2024     8:26 AM   Additional Questions   Roomed by Clarisse SOUTH   Accompanied by Self       New patient to this clinic.    Patient with history of diabetes hypertension and hyperlipidemia.  Right foot pain started last week, got better over the weekend, then Monday, it moved to the left foot.  Started the morning when he got up and put his feet down.   After he sits for a while, gets up and walk, the first step is the worst pain.  At the end of the day the foot aches..  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  On his feet a lot.  Last week he was wearing a pair of bad tennis shoes or the soles are almost completely gone.  He is worried about getting neuropathy from diabetes.  Most recent A1c was 7.8.    History of Present Illness       Reason for visit:  Foot pain  Symptom onset:  1-2 weeks ago  Symptoms include:  Ain  Symptom intensity:  Severe  Symptom progression:  Worsening  Had these symptoms before:  Yes  Has tried/received treatment for these symptoms:  No  What makes it worse:  Walking   He is taking  "medications regularly.          Review of Systems  Constitutional, HEENT, cardiovascular, pulmonary, gi and gu systems are negative, except as otherwise noted.      Objective    /84   Pulse 81   Temp 98.3  F (36.8  C)   Resp 18   Ht 1.778 m (5' 10\")   Wt 101.8 kg (224 lb 6.4 oz)   SpO2 95%   BMI 32.20 kg/m    Body mass index is 32.2 kg/m .  Physical Exam   GENERAL: alert and no distress  MS: Flat-footed.  Right foot with slight tenderness at the bottom of the midfoot on the medial side.  The left foot with moderate tenderness at the plantar side, just anterior and medial to the heel.  There is mild swelling on the medial aspect of the midfoot.  No joint pain at the ankle or the forefoot.  Diabetic foot exam: normal DP and PT pulses, no trophic changes or ulcerative lesions, and normal sensory exam            Signed Electronically by: Yonathan Olivo MD PhD    "

## 2024-12-19 ENCOUNTER — FCC EXTENDED DOCUMENTATION (OUTPATIENT)
Dept: PSYCHOLOGY | Facility: CLINIC | Age: 64
End: 2024-12-19
Payer: COMMERCIAL

## 2024-12-19 NOTE — PROGRESS NOTES
Discharge Summary  Multiple Sessions    Client Name: Zackery Rogers MRN#: [id number] YOB: 1960      Intake / Discharge Date: 5/30/24-12/19/24      DSM5 Diagnoses: (Sustained by DSM5 Criteria Listed Above)  Diagnoses: 300.02 (F41.1) Generalized Anxiety Disorder  Psychosocial & Contextual Factors: , employed, lives in own home with wife and two adult sons  .   WHODAS 2.0 (12 item) Score: NA          Presenting Concern:  anxiety      Reason for Discharge:  Client did not return      Disposition at Time of Last Encounter:   Comments:   NA-ct did not rtn     Risk Management:   Client denies a history of suicidal ideation, suicide attempts, self-injurious behavior, homicidal ideation, homicidal behavior, and and other safety concerns  Recommended that patient call 911 or go to the local ED should there be a change in any of these risk factors      Referred To:  NA-ct did not rtn        MYCHAL Vinson   12/19/2024

## 2024-12-22 ENCOUNTER — HEALTH MAINTENANCE LETTER (OUTPATIENT)
Age: 64
End: 2024-12-22

## 2025-03-19 ENCOUNTER — OFFICE VISIT (OUTPATIENT)
Dept: FAMILY MEDICINE | Facility: CLINIC | Age: 65
End: 2025-03-19

## 2025-03-19 VITALS
SYSTOLIC BLOOD PRESSURE: 138 MMHG | OXYGEN SATURATION: 94 % | RESPIRATION RATE: 16 BRPM | HEIGHT: 70 IN | HEART RATE: 93 BPM | DIASTOLIC BLOOD PRESSURE: 86 MMHG | TEMPERATURE: 97.5 F | WEIGHT: 222.2 LBS | BODY MASS INDEX: 31.81 KG/M2

## 2025-03-19 DIAGNOSIS — F33.1 MODERATE RECURRENT MAJOR DEPRESSION (H): ICD-10-CM

## 2025-03-19 DIAGNOSIS — Z79.4 TYPE 2 DIABETES MELLITUS WITH HYPERGLYCEMIA, WITH LONG-TERM CURRENT USE OF INSULIN (H): Primary | ICD-10-CM

## 2025-03-19 DIAGNOSIS — F41.9 ANXIETY: ICD-10-CM

## 2025-03-19 DIAGNOSIS — E11.9 TYPE 2 DIABETES MELLITUS WITHOUT COMPLICATION, WITHOUT LONG-TERM CURRENT USE OF INSULIN (H): ICD-10-CM

## 2025-03-19 DIAGNOSIS — E11.65 TYPE 2 DIABETES MELLITUS WITH HYPERGLYCEMIA, WITH LONG-TERM CURRENT USE OF INSULIN (H): Primary | ICD-10-CM

## 2025-03-19 DIAGNOSIS — F41.0 PANIC ATTACK: ICD-10-CM

## 2025-03-19 DIAGNOSIS — I10 ESSENTIAL HYPERTENSION: ICD-10-CM

## 2025-03-19 LAB
EST. AVERAGE GLUCOSE BLD GHB EST-MCNC: 197 MG/DL
HBA1C MFR BLD: 8.5 % (ref 0–5.6)

## 2025-03-19 PROCEDURE — 96127 BRIEF EMOTIONAL/BEHAV ASSMT: CPT | Performed by: PHYSICIAN ASSISTANT

## 2025-03-19 PROCEDURE — 99214 OFFICE O/P EST MOD 30 MIN: CPT | Performed by: PHYSICIAN ASSISTANT

## 2025-03-19 PROCEDURE — G2211 COMPLEX E/M VISIT ADD ON: HCPCS | Performed by: PHYSICIAN ASSISTANT

## 2025-03-19 RX ORDER — BUSPIRONE HYDROCHLORIDE 5 MG/1
5 TABLET ORAL 3 TIMES DAILY
Qty: 90 TABLET | Refills: 1 | Status: SHIPPED | OUTPATIENT
Start: 2025-03-19

## 2025-03-19 RX ORDER — HYDROXYZINE PAMOATE 25 MG/1
25 CAPSULE ORAL 3 TIMES DAILY PRN
Qty: 20 CAPSULE | Refills: 4 | Status: SHIPPED | OUTPATIENT
Start: 2025-03-19

## 2025-03-19 RX ORDER — SERTRALINE HYDROCHLORIDE 100 MG/1
150 TABLET, FILM COATED ORAL DAILY
Qty: 135 TABLET | Refills: 0 | Status: SHIPPED | OUTPATIENT
Start: 2025-03-19

## 2025-03-19 ASSESSMENT — ANXIETY QUESTIONNAIRES
4. TROUBLE RELAXING: NEARLY EVERY DAY
2. NOT BEING ABLE TO STOP OR CONTROL WORRYING: NOT AT ALL
5. BEING SO RESTLESS THAT IT IS HARD TO SIT STILL: NOT AT ALL
6. BECOMING EASILY ANNOYED OR IRRITABLE: NEARLY EVERY DAY
GAD7 TOTAL SCORE: 14
1. FEELING NERVOUS, ANXIOUS, OR ON EDGE: NEARLY EVERY DAY
7. FEELING AFRAID AS IF SOMETHING AWFUL MIGHT HAPPEN: SEVERAL DAYS
IF YOU CHECKED OFF ANY PROBLEMS ON THIS QUESTIONNAIRE, HOW DIFFICULT HAVE THESE PROBLEMS MADE IT FOR YOU TO DO YOUR WORK, TAKE CARE OF THINGS AT HOME, OR GET ALONG WITH OTHER PEOPLE: VERY DIFFICULT
3. WORRYING TOO MUCH ABOUT DIFFERENT THINGS: NEARLY EVERY DAY
7. FEELING AFRAID AS IF SOMETHING AWFUL MIGHT HAPPEN: SEVERAL DAYS
GAD7 TOTAL SCORE: 14
2. NOT BEING ABLE TO STOP OR CONTROL WORRYING: SEVERAL DAYS
6. BECOMING EASILY ANNOYED OR IRRITABLE: NEARLY EVERY DAY
8. IF YOU CHECKED OFF ANY PROBLEMS, HOW DIFFICULT HAVE THESE MADE IT FOR YOU TO DO YOUR WORK, TAKE CARE OF THINGS AT HOME, OR GET ALONG WITH OTHER PEOPLE?: EXTREMELY DIFFICULT
5. BEING SO RESTLESS THAT IT IS HARD TO SIT STILL: SEVERAL DAYS
GAD7 TOTAL SCORE: 11
7. FEELING AFRAID AS IF SOMETHING AWFUL MIGHT HAPPEN: SEVERAL DAYS
1. FEELING NERVOUS, ANXIOUS, OR ON EDGE: NEARLY EVERY DAY
4. TROUBLE RELAXING: MORE THAN HALF THE DAYS
3. WORRYING TOO MUCH ABOUT DIFFERENT THINGS: SEVERAL DAYS
GAD7 TOTAL SCORE: 14
IF YOU CHECKED OFF ANY PROBLEMS ON THIS QUESTIONNAIRE, HOW DIFFICULT HAVE THESE PROBLEMS MADE IT FOR YOU TO DO YOUR WORK, TAKE CARE OF THINGS AT HOME, OR GET ALONG WITH OTHER PEOPLE: EXTREMELY DIFFICULT

## 2025-03-19 ASSESSMENT — ENCOUNTER SYMPTOMS: NERVOUS/ANXIOUS: 1

## 2025-03-19 ASSESSMENT — PATIENT HEALTH QUESTIONNAIRE - PHQ9
SUM OF ALL RESPONSES TO PHQ QUESTIONS 1-9: 12
SUM OF ALL RESPONSES TO PHQ QUESTIONS 1-9: 12
10. IF YOU CHECKED OFF ANY PROBLEMS, HOW DIFFICULT HAVE THESE PROBLEMS MADE IT FOR YOU TO DO YOUR WORK, TAKE CARE OF THINGS AT HOME, OR GET ALONG WITH OTHER PEOPLE: EXTREMELY DIFFICULT

## 2025-03-19 ASSESSMENT — PAIN SCALES - GENERAL: PAINLEVEL_OUTOF10: NO PAIN (0)

## 2025-03-19 NOTE — LETTER
3/19/2025    Zackery Rogers   1960        To Whom it May Concern;    Please excuse Zackery Rogers from work/school for a healthcare visit on Mar 19, 2025. Please excuse until 2025 due to medical issue.     Sincerely,    Og Cramer PA-C

## 2025-03-19 NOTE — PROGRESS NOTES
Assessment & Plan     (E11.65,  Z79.4) Type 2 diabetes mellitus with hyperglycemia, with long-term current use of insulin (H)  (primary encounter diagnosis)  Comment: History of type 2 diabetes.  Discussed with patient recheck hemoglobin A1c has had poor follow-up on A1c and has not been within goal since 2021.  Patient reports compliance with medications.  He is on metformin 1000 mg twice daily as well as insulin 40 units at bedtime with glargine.  Discussed  potential changes in medication pending results of A1c  Plan: Albumin Random Urine Quantitative with Creat         Ratio, HEMOGLOBIN A1C          (I10) Essential hypertension  Comment: History of hypertension blood pressure within goal continue with lisinopril  Plan: BASIC METABOLIC PANEL          (F41.9) Anxiety  Comment: Poor control of anxiety with hydroxyzine as well as sertraline.  Discussed with patient restarting BuSpar was prescribed this years ago and is uncertain why he stopped it.  Patient denies any thoughts of self-harm.  Referral for psychiatry placed.  If he develops any thoughts of self-harm to seek emergent evaluation  Plan: Adult Mental Health  Referral,         busPIRone (BUSPAR) 5 MG tablet, sertraline         (ZOLOFT) 100 MG tablet, hydrOXYzine cande         (VISTARIL) 25 MG capsule, TSH with free T4         reflex          (F41.0) Panic attack  Comment: Panic attack.  Patient with intermittent episodes continue with sertraline and Vistaril.  Discussed trial of BuSpar referral for psychiatry placed  Plan: Adult Mental Health  Referral          (F33.1) Moderate recurrent major depression (H)  Comment: No active thoughts of self-harm continue with sertraline  Plan: Adult Mental Health  Referral           Patient is new to me.  I discussed with him at length he needs to follow-up regularly for diabetic checks.  Patient expressed understanding.    Depression Screening Follow Up        3/19/2025     2:40 PM   PHQ    PHQ-9 Total Score 12    Q9: Thoughts of better off dead/self-harm past 2 weeks Not at all       Patient-reported           Follow Up Actions Taken  Referral for psychiatry and change in medications    Yash Chen is a 64 year old, presenting for the following health issues:  Anxiety        3/19/2025     2:43 PM   Additional Questions   Roomed by MARIA ELENA PONCE   Accompanied by SELF     History of Present Illness       Mental Health Follow-up:  Patient presents to follow-up on Anxiety.    Patient's anxiety since last visit has been:  Worse  The patient is having other symptoms associated with anxiety.  Any significant life events: No  Patient is feeling anxious or having panic attacks.  Patient has no concerns about alcohol or drug use.    He eats 0-1 servings of fruits and vegetables daily.He consumes 0 sweetened beverage(s) daily.He exercises with enough effort to increase his heart rate 20 to 29 minutes per day.  He exercises with enough effort to increase his heart rate 5 days per week.   He is taking medications regularly.      History of anxiety with steady progression over the last 2 years. Was having more anxiety issues at work that have been progressive. Symptoms with chest tension. Was started on sertraline titrated up to 150 mg. Had gone up to 200 mg of sertraline for a brief period. Stressors with limited activity at work as well as relationship stressors at work .   Typical panic episodes with arguments at work. Has been having poor sleep with this as well. Issues with frequent wakening.     No alcohol.   Caffeine MT dew zero.     Blood sugar - 120  Insulin 40 units at bedtime.     Patient states compliance with all of his medications        4/17/2024    11:33 AM 5/30/2024     6:56 AM 3/19/2025     2:40 PM   PHQ   PHQ-9 Total Score 13 11 12    Q9: Thoughts of better off dead/self-harm past 2 weeks Not at all  Not at all Not at all       Patient-reported    Proxy-reported         6/13/2024     9:20  "AM 3/19/2025     2:36 PM 3/19/2025     2:45 PM   LINDA-7 SCORE   Total Score 11 (moderate anxiety) 14 (moderate anxiety)    Total Score 11    11 14  11       Patient-reported       Review of Systems  Constitutional, HEENT, cardiovascular, pulmonary, gi and gu systems are negative, except as otherwise noted.      Objective    /86   Pulse 93   Temp 97.5  F (36.4  C) (Tympanic)   Resp 16   Ht 1.77 m (5' 9.69\")   Wt 100.8 kg (222 lb 3.2 oz)   SpO2 94%   BMI 32.17 kg/m    Body mass index is 32.17 kg/m .  Physical Exam   GENERAL: alert, no distress, and over weight  RESP: lungs clear to auscultation - no rales, rhonchi or wheezes  CV: regular rate and rhythm, normal S1 S2, no S3 or S4, no murmur, click or rub, no peripheral edema  MS: no gross musculoskeletal defects noted, no edema  PSYCH: mentation appears normal, affect normal/bright            Signed Electronically by: Og Cramer PA-C    "

## 2025-03-20 LAB
ANION GAP SERPL CALCULATED.3IONS-SCNC: 12 MMOL/L (ref 7–15)
BUN SERPL-MCNC: 13.2 MG/DL (ref 8–23)
CALCIUM SERPL-MCNC: 9 MG/DL (ref 8.8–10.4)
CHLORIDE SERPL-SCNC: 102 MMOL/L (ref 98–107)
CREAT SERPL-MCNC: 0.91 MG/DL (ref 0.67–1.17)
CREAT UR-MCNC: 108 MG/DL
EGFRCR SERPLBLD CKD-EPI 2021: >90 ML/MIN/1.73M2
GLUCOSE SERPL-MCNC: 165 MG/DL (ref 70–99)
HCO3 SERPL-SCNC: 26 MMOL/L (ref 22–29)
MICROALBUMIN UR-MCNC: <12 MG/L
MICROALBUMIN/CREAT UR: NORMAL MG/G{CREAT}
POTASSIUM SERPL-SCNC: 4.1 MMOL/L (ref 3.4–5.3)
SODIUM SERPL-SCNC: 140 MMOL/L (ref 135–145)
TSH SERPL DL<=0.005 MIU/L-ACNC: 3.2 UIU/ML (ref 0.3–4.2)

## 2025-03-20 RX ORDER — INSULIN GLARGINE 100 [IU]/ML
44 INJECTION, SOLUTION SUBCUTANEOUS AT BEDTIME
Qty: 45 ML | Refills: 0 | Status: SHIPPED | OUTPATIENT
Start: 2025-03-20

## 2025-03-20 ASSESSMENT — ANXIETY QUESTIONNAIRES: GAD7 TOTAL SCORE: 14

## 2025-06-04 DIAGNOSIS — Z29.9 PREVENTIVE MEASURE: ICD-10-CM

## 2025-06-05 RX ORDER — ASPIRIN 81 MG/1
TABLET, CHEWABLE ORAL
Qty: 90 TABLET | Refills: 0 | Status: SHIPPED | OUTPATIENT
Start: 2025-06-05

## 2025-06-25 ENCOUNTER — TELEPHONE (OUTPATIENT)
Dept: FAMILY MEDICINE | Facility: CLINIC | Age: 65
End: 2025-06-25
Payer: COMMERCIAL

## 2025-06-25 NOTE — TELEPHONE ENCOUNTER
Patient Quality Outreach    Patient is due for the following:   Diabetes -  Eye Exam  Depression  -  DAP  Physical Annual Wellness Visit      Topic Date Due    Zoster (Shingles) Vaccine (1 of 2) Never done    COVID-19 Vaccine (4 - 2024-25 season) 09/01/2024       Action(s) Taken:   Schedule a Annual Wellness Visit    Type of outreach:    Sent [employer] message.    Questions for provider review:             Alma Rosa Stratton MA  Chart routed to .

## 2025-08-11 DIAGNOSIS — E11.9 TYPE 2 DIABETES MELLITUS WITHOUT COMPLICATION, WITHOUT LONG-TERM CURRENT USE OF INSULIN (H): ICD-10-CM

## 2025-08-11 RX ORDER — INSULIN GLARGINE 100 [IU]/ML
44 INJECTION, SOLUTION SUBCUTANEOUS AT BEDTIME
Qty: 45 ML | Refills: 0 | Status: SHIPPED | OUTPATIENT
Start: 2025-08-11

## 2025-08-31 DIAGNOSIS — I10 ESSENTIAL HYPERTENSION: ICD-10-CM

## 2025-09-02 RX ORDER — LISINOPRIL 40 MG/1
40 TABLET ORAL DAILY
Qty: 90 TABLET | Refills: 0 | Status: SHIPPED | OUTPATIENT
Start: 2025-09-02

## (undated) DEVICE — SUCTION MANIFOLD DORNOCH ULTRA CART UL-CL500

## (undated) DEVICE — KIT ENDO FIRST STEP DISINFECTANT 200ML W/POUCH EP-4

## (undated) DEVICE — PACK ENDOSCOPY GI CUSTOM UMMC

## (undated) DEVICE — ENDO BITE BLOCK ADULT OMNI-BLOC

## (undated) DEVICE — ENDO PROBE COVER ULTRASOUND BALLOON LATEX  MAJ-249

## (undated) DEVICE — BIOPSY VALVE BIOSHIELD 00711135

## (undated) DEVICE — ENDO CONNECTOR ENDOGATOR AUX WATER JET FOR OLYMPUS SCOPE

## (undated) DEVICE — CATH BALLOON ELATION ESOPH/PYLORIC 12-13.5-15MMX180CM EPB12

## (undated) DEVICE — CATH RETRIEVAL BALLOON EXTRACTOR PRO RX-S INJ ABOVE 9-12MM

## (undated) DEVICE — INFLATION DEVICE BIG 60 ENDO-AN6012

## (undated) DEVICE — ENDO CAP AND TUBING STERILE FOR ENDOGATOR  100130

## (undated) DEVICE — SPECIMEN TRAP MUCOUS 40ML LUKI C30200A

## (undated) DEVICE — SOL WATER IRRIG 1000ML BOTTLE 2F7114

## (undated) DEVICE — TAPE DURAPORE 3" SILK 1538-3

## (undated) DEVICE — WIRE GUIDE 0.025"X450CM STR VISIGLIDE G-240-2545S

## (undated) DEVICE — ENDO TUBING CO2 SMARTCAP STERILE DISP 100145CO2EXT

## (undated) DEVICE — SNARE CAPTIVATOR II POLYPECTOMY 33X240MM M00561291

## (undated) DEVICE — ENDO SNARE POLYPECTOMY SPIRAL 20MM LOOP SD-230U-20

## (undated) DEVICE — ENDO DEVICE LOCKING AND BIOPSY CAP M00545261

## (undated) DEVICE — ESU GROUND PAD ADULT W/CORD E7507

## (undated) RX ORDER — ESMOLOL HYDROCHLORIDE 10 MG/ML
INJECTION INTRAVENOUS
Status: DISPENSED
Start: 2017-06-05

## (undated) RX ORDER — PROPOFOL 10 MG/ML
INJECTION, EMULSION INTRAVENOUS
Status: DISPENSED
Start: 2017-06-05

## (undated) RX ORDER — ALBUMIN, HUMAN INJ 5% 5 %
SOLUTION INTRAVENOUS
Status: DISPENSED
Start: 2017-06-05

## (undated) RX ORDER — PHENYLEPHRINE HCL IN 0.9% NACL 1 MG/10 ML
SYRINGE (ML) INTRAVENOUS
Status: DISPENSED
Start: 2017-06-05

## (undated) RX ORDER — ONDANSETRON 2 MG/ML
INJECTION INTRAMUSCULAR; INTRAVENOUS
Status: DISPENSED
Start: 2017-06-05

## (undated) RX ORDER — METOPROLOL TARTRATE 1 MG/ML
INJECTION, SOLUTION INTRAVENOUS
Status: DISPENSED
Start: 2017-06-05

## (undated) RX ORDER — SIMETHICONE 40MG/0.6ML
SUSPENSION, DROPS(FINAL DOSAGE FORM)(ML) ORAL
Status: DISPENSED
Start: 2017-06-05

## (undated) RX ORDER — DEXAMETHASONE SODIUM PHOSPHATE 4 MG/ML
INJECTION, SOLUTION INTRA-ARTICULAR; INTRALESIONAL; INTRAMUSCULAR; INTRAVENOUS; SOFT TISSUE
Status: DISPENSED
Start: 2017-06-05

## (undated) RX ORDER — FENTANYL CITRATE 50 UG/ML
INJECTION, SOLUTION INTRAMUSCULAR; INTRAVENOUS
Status: DISPENSED
Start: 2017-06-05

## (undated) RX ORDER — LIDOCAINE HYDROCHLORIDE 20 MG/ML
INJECTION, SOLUTION EPIDURAL; INFILTRATION; INTRACAUDAL; PERINEURAL
Status: DISPENSED
Start: 2017-06-05